# Patient Record
Sex: FEMALE | Race: WHITE | Employment: FULL TIME | URBAN - METROPOLITAN AREA
[De-identification: names, ages, dates, MRNs, and addresses within clinical notes are randomized per-mention and may not be internally consistent; named-entity substitution may affect disease eponyms.]

---

## 2018-03-04 LAB
LEFT VENTRICULAR EJECTION FRACTION HIGH VALUE: 45 %
LEFT VENTRICULAR EJECTION FRACTION MODE: NORMAL
LV EF: 40 %

## 2018-03-14 ENCOUNTER — OFFICE VISIT (OUTPATIENT)
Dept: CARDIOLOGY CLINIC | Age: 68
End: 2018-03-14
Payer: MEDICARE

## 2018-03-14 VITALS
RESPIRATION RATE: 20 BRPM | SYSTOLIC BLOOD PRESSURE: 104 MMHG | BODY MASS INDEX: 39.39 KG/M2 | HEART RATE: 78 BPM | HEIGHT: 59 IN | DIASTOLIC BLOOD PRESSURE: 60 MMHG | WEIGHT: 195.4 LBS

## 2018-03-14 DIAGNOSIS — I25.10 CORONARY ARTERY DISEASE INVOLVING NATIVE HEART WITHOUT ANGINA PECTORIS, UNSPECIFIED VESSEL OR LESION TYPE: ICD-10-CM

## 2018-03-14 DIAGNOSIS — I10 ESSENTIAL HYPERTENSION: Primary | ICD-10-CM

## 2018-03-14 PROCEDURE — 99214 OFFICE O/P EST MOD 30 MIN: CPT | Performed by: CLINICAL NURSE SPECIALIST

## 2018-03-14 PROCEDURE — 93000 ELECTROCARDIOGRAM COMPLETE: CPT | Performed by: CLINICAL NURSE SPECIALIST

## 2018-03-14 RX ORDER — NITROGLYCERIN 0.4 MG/1
0.4 TABLET SUBLINGUAL EVERY 5 MIN PRN
Qty: 25 TABLET | Refills: 3 | Status: SHIPPED | OUTPATIENT
Start: 2018-03-14 | End: 2019-11-11 | Stop reason: SDUPTHER

## 2018-03-14 RX ORDER — CARVEDILOL 3.12 MG/1
3.12 TABLET ORAL 2 TIMES DAILY WITH MEALS
COMMUNITY
End: 2018-03-14 | Stop reason: SDUPTHER

## 2018-03-14 RX ORDER — CARVEDILOL 3.12 MG/1
3.12 TABLET ORAL 2 TIMES DAILY WITH MEALS
Qty: 60 TABLET | Refills: 3 | Status: SHIPPED | OUTPATIENT
Start: 2018-03-14 | End: 2018-05-02 | Stop reason: SDUPTHER

## 2018-03-14 RX ORDER — ASPIRIN 81 MG/1
81 TABLET ORAL DAILY
Qty: 30 TABLET | Refills: 3 | Status: ON HOLD
Start: 2018-03-14 | End: 2021-04-09 | Stop reason: HOSPADM

## 2018-03-14 RX ORDER — LISINOPRIL 2.5 MG/1
2.5 TABLET ORAL DAILY
Qty: 30 TABLET | Refills: 3 | Status: SHIPPED | OUTPATIENT
Start: 2018-03-14 | End: 2018-05-02 | Stop reason: SDUPTHER

## 2018-03-14 NOTE — PROGRESS NOTES
[facility]  Dearl MD Shreyas Edmondson MD Dutch Quick, MD Elona Lyme. Sharmaine Barrera MD            Jose Alberto Gifford   1950  Chester Mann DO     Mrs. Jose Alberto Gifford is a 78year old female who is followed at our practice by Dr. Rachel Raymundo; she is being seen in post hospital follow up today after admission at [facility] for chest pain and subsequent cardiac catheterization, which showed nonobstructive coronary artery disease. She was discharged home on Aspirin and Coreg. She has had no further chest pain, but reports exertional dyspnea (e.g. when walking through parking lots). She attributes some of this to weight gain, but also notes loud snoring and daytime fatigue. She does note a worsening heartburn since starting Aspirin. She denies orthopnea, PND, or lower extremity edema. She has diffuse musculoskeletal pain, and is attempting to manage this without further use of Mobic or Soma. She has had a five pound weight gain since her last visit. Past medical history:  1. Obesity. BMI 39.  2. Allergy to Biaxin, codeine, neosporin and sulfa. 3. Hypertension. 4. Hyperlipidemia. 5. Hypothyroidism. 6. Depression. 7. Embolic CVA in 3085. 8. SARAH demonstrated a small PFO. Evaluated at [facility] and offered PFO closure as part of a CLOSURE I trial. Patient refused. Treated with aspirin. 9. No history of diabetes, CAD or PAD. 10. Echo 07/09/2007. EF 61%. Stage I diastolic dysfunction. No significant valvulopathy. 11. Lexiscan MPS 11/16/11. Normal perfusion. Breast attenuation artifact. EF 56%. 12. Lexiscan MPS 11/20/14. Normal perfusion. EF 51%. No change from previous study. 13. Admitted to [facility] 3/02/2018 to 3/06/2018 due to chest pain. Troponin peaked at 0.1  14. [place] nuclear stress test 3/03/2018: mild ischemia involving the anterior walls  15. Echocardiogram 3/04/2018: Doppler consistent with diastolic dysfunction. Left ventricle dilated. Normal left ventricular wall thickness. EF 40-45%. Moderate to severe anterior wall hypokinesis. Mild mitral valve regurgitation. Trace/mild aortic valve regurgitation. 16. Cardiac catheterization 3/05/2018: Left main short in length, moderate in caliber with luminal irregularities throughout its course. LAD long in length, large in caliber with luminal irregularities throughout its course. The diagonal system is moderate in caliber, and has luminal irregularities throughout its course. The LCx is long in length and large in caliber. There is very mild disease of about 20% in the proximal segment before its bifurcation. The AV groove branch is small in caliber. The obtuse marginal is large in caliber with a 20% lesion in its proximal segment. The RCA is long in length and large in caliber. There is another mild 20-30% lesion in the  Mid segment. The PDA and PLB are both of large caliber. They have luminal irregularities throughout their course. The LVEDP was 11 mmHg. Ejection fraction 50%. 17. Diverticulosis and colitis  18. Osteoarthritis  19. Remote tobacco abuse     Review of Systems:  HEENT: negative for acute visual and auditory problems  Constitutional: negative for fever and chills. She reports progressive weight gain since the death of her  a few years ago. Respiratory: negative for cough, orthopnea, or PND. Exertional dyspnea as described above. Cardiovascular: as per HPI. She denies palpitations or lower extremity edema. Gastrointestinal: negative for abdominal pain, diarrhea, nausea and vomiting. Heart burn as described in HPI. She also reports abdominal bloating. Genitourinary: negative for dysuria and hematuria  Derm: negative for rash and skin lesion(s)  Neurological: negative for seizures and tremors, dizziness or syncope. Endocrine: negative for diabetic symptoms including polydipsia and polyuria  Musculoskeletal: she has diffuse arthritic pains as well as myalgias. Psychiatric: negative for anxiety and major depression    On exam, she is a middle-aged, obese female in no particular distress. BP: 104/60. P: 78. R: 20. Wt. 195 lbs. BMI: 39.47. HEENT, conjunctiva pink. Sclera anicteric. Mucous membranes are moist. Neck, no JVD could be appreciated. Short and thick neck. No carotid bruits. Chest expands normally. No intercostal retractions. Cardiovascular exam, PMI could not be palpated. S1/S2 are normal.  Heart sounds are distant with no murmurs, rubs or gallops. Clear breath sounds throughout. No creps, rhonchi or wheezes. Abdomen is soft, nontender with intact bowel sounds. Extremities have no edema but her legs are tender to palpation. Distal pulses are normal bilaterally. No cyanosis. Skin has no rashes. Neurologically, oriented x 3. Psych, she is pleasant, but somewhat flat. Muscle tone is normal.     Diagnostic Testing:   EKG today showed sinus rhythm, 78 bpm, nonspecfic T wave abnormalities. No acute changes. CBC 3/06/2018: WBC 5.5, HGB 10.3, HCT 30.6, platelets 877  BMP 4/78/5389: sodium 139, potassium 4.5, chloride 101, CO2 28, BUN 19, Cr 0.75, GFR >60  Lipid panel 3/06/2018: cholesterol 169, triglycerides 174, LDL 94, HDL 40      Current Outpatient Prescriptions:     nitroGLYCERIN (NITROSTAT) 0.4 MG SL tablet, Place 1 tablet under the tongue every 5 minutes as needed for Chest pain, Disp: 25 tablet, Rfl: 3    lisinopril (PRINIVIL;ZESTRIL) 2.5 MG tablet, Take 1 tablet by mouth daily, Disp: 30 tablet, Rfl: 3    aspirin 81 MG EC tablet, Take 1 tablet by mouth daily, Disp: 30 tablet, Rfl: 3    carvedilol (COREG) 3.125 MG tablet, Take 1 tablet by mouth 2 times daily (with meals), Disp: 60 tablet, Rfl: 3    mesalamine (LIALDA) 1.2 G EC tablet, Take 1,200 mg by mouth daily (with breakfast), Disp: , Rfl:     levothyroxine (SYNTHROID) 100 MCG tablet, Take 100 mcg by mouth Daily. , Disp: , Rfl:     ascorbic acid (VITAMIN C) 500 MG tablet, Take 500 mg by mouth

## 2018-05-02 DIAGNOSIS — I25.10 CORONARY ARTERY DISEASE INVOLVING NATIVE HEART WITHOUT ANGINA PECTORIS, UNSPECIFIED VESSEL OR LESION TYPE: ICD-10-CM

## 2018-05-02 DIAGNOSIS — I10 ESSENTIAL HYPERTENSION: ICD-10-CM

## 2018-05-02 RX ORDER — LISINOPRIL 2.5 MG/1
2.5 TABLET ORAL DAILY
Qty: 90 TABLET | Refills: 3 | Status: SHIPPED | OUTPATIENT
Start: 2018-05-02 | End: 2019-03-22

## 2018-05-02 RX ORDER — SIMVASTATIN 20 MG
20 TABLET ORAL NIGHTLY
Qty: 90 TABLET | Refills: 3 | Status: SHIPPED | OUTPATIENT
Start: 2018-05-02 | End: 2019-01-28 | Stop reason: SDUPTHER

## 2018-05-02 RX ORDER — CARVEDILOL 3.12 MG/1
3.12 TABLET ORAL 2 TIMES DAILY WITH MEALS
Qty: 180 TABLET | Refills: 3 | Status: SHIPPED | OUTPATIENT
Start: 2018-05-02 | End: 2019-03-22 | Stop reason: SDUPTHER

## 2018-06-25 ENCOUNTER — HOSPITAL ENCOUNTER (OUTPATIENT)
Age: 68
Discharge: HOME | End: 2018-06-27
Payer: MEDICARE

## 2018-06-25 ENCOUNTER — HOSPITAL ENCOUNTER (OUTPATIENT)
Age: 68
Discharge: HOME | End: 2018-06-25
Payer: MEDICARE

## 2018-06-25 ENCOUNTER — OFFICE VISIT (OUTPATIENT)
Dept: CARDIOLOGY CLINIC | Age: 68
End: 2018-06-25
Payer: MEDICARE

## 2018-06-25 VITALS
DIASTOLIC BLOOD PRESSURE: 60 MMHG | HEIGHT: 59 IN | BODY MASS INDEX: 40 KG/M2 | RESPIRATION RATE: 18 BRPM | HEART RATE: 75 BPM | SYSTOLIC BLOOD PRESSURE: 114 MMHG | WEIGHT: 198.4 LBS

## 2018-06-25 DIAGNOSIS — Q21.12 PFO (PATENT FORAMEN OVALE): ICD-10-CM

## 2018-06-25 DIAGNOSIS — I63.9 CEREBROVASCULAR ACCIDENT (CVA), UNSPECIFIED MECHANISM (HCC): ICD-10-CM

## 2018-06-25 DIAGNOSIS — R07.9 CHEST PAIN, UNSPECIFIED TYPE: Primary | ICD-10-CM

## 2018-06-25 DIAGNOSIS — I42.8 NONISCHEMIC CARDIOMYOPATHY (HCC): ICD-10-CM

## 2018-06-25 LAB
ALBUMIN SERPL-MCNC: 4.1 G/DL (ref 3.5–5.2)
ALP BLD-CCNC: 86 U/L (ref 35–104)
ALT SERPL-CCNC: 14 U/L (ref 0–32)
ANION GAP SERPL CALCULATED.3IONS-SCNC: 12 MMOL/L (ref 7–16)
AST SERPL-CCNC: 14 U/L (ref 0–31)
BASOPHILS ABSOLUTE: 0.03 E9/L (ref 0–0.2)
BASOPHILS RELATIVE PERCENT: 0.4 % (ref 0–2)
BILIRUB SERPL-MCNC: 0.2 MG/DL (ref 0–1.2)
BUN BLDV-MCNC: 17 MG/DL (ref 8–23)
CALCIUM SERPL-MCNC: 9.5 MG/DL (ref 8.6–10.2)
CHLORIDE BLD-SCNC: 99 MMOL/L (ref 98–107)
CO2: 28 MMOL/L (ref 22–29)
CREAT SERPL-MCNC: 0.6 MG/DL (ref 0.5–1)
EOSINOPHILS ABSOLUTE: 0.01 E9/L (ref 0.05–0.5)
EOSINOPHILS RELATIVE PERCENT: 0.1 % (ref 0–6)
FOLATE: >20 NG/ML (ref 4.8–24.2)
GFR AFRICAN AMERICAN: >60
GFR NON-AFRICAN AMERICAN: >60 ML/MIN/1.73
GLUCOSE BLD-MCNC: 139 MG/DL (ref 74–109)
HCT VFR BLD CALC: 33.3 % (ref 34–48)
HEMOGLOBIN: 10.4 G/DL (ref 11.5–15.5)
IMMATURE GRANULOCYTES #: 0.07 E9/L
IMMATURE GRANULOCYTES %: 1 % (ref 0–5)
IMMATURE RETIC FRACT: 13.4 % (ref 3–15.9)
IRON SATURATION: 16 % (ref 15–50)
IRON: 44 MCG/DL (ref 37–145)
LYMPHOCYTES ABSOLUTE: 0.96 E9/L (ref 1.5–4)
LYMPHOCYTES RELATIVE PERCENT: 13.1 % (ref 20–42)
MCH RBC QN AUTO: 26.1 PG (ref 26–35)
MCHC RBC AUTO-ENTMCNC: 31.2 % (ref 32–34.5)
MCV RBC AUTO: 83.7 FL (ref 80–99.9)
MONOCYTES ABSOLUTE: 0.64 E9/L (ref 0.1–0.95)
MONOCYTES RELATIVE PERCENT: 8.7 % (ref 2–12)
NEUTROPHILS ABSOLUTE: 5.64 E9/L (ref 1.8–7.3)
NEUTROPHILS RELATIVE PERCENT: 76.7 % (ref 43–80)
PDW BLD-RTO: 14 FL (ref 11.5–15)
PLATELET # BLD: 217 E9/L (ref 130–450)
PMV BLD AUTO: 10.9 FL (ref 7–12)
POTASSIUM SERPL-SCNC: 4.7 MMOL/L (ref 3.5–5)
RBC # BLD: 3.98 E12/L (ref 3.5–5.5)
RETIC HGB EQUIVALENT: 30.9 PG (ref 28.2–36.6)
RETICULOCYTE ABSOLUTE COUNT: 0.08 E12/L
RETICULOCYTE COUNT PCT: 2 % (ref 0.4–1.9)
SEDIMENTATION RATE, ERYTHROCYTE: 46 MM/HR (ref 0–20)
SODIUM BLD-SCNC: 139 MMOL/L (ref 132–146)
TOTAL IRON BINDING CAPACITY: 283 MCG/DL (ref 250–450)
TOTAL PROTEIN: 7.6 G/DL (ref 6.4–8.3)
VITAMIN B-12: 460 PG/ML (ref 211–946)
WBC # BLD: 7.4 E9/L (ref 4.5–11.5)

## 2018-06-25 PROCEDURE — G8417 CALC BMI ABV UP PARAM F/U: HCPCS | Performed by: INTERNAL MEDICINE

## 2018-06-25 PROCEDURE — 83540 ASSAY OF IRON: CPT

## 2018-06-25 PROCEDURE — 85045 AUTOMATED RETICULOCYTE COUNT: CPT

## 2018-06-25 PROCEDURE — 85025 COMPLETE CBC W/AUTO DIFF WBC: CPT

## 2018-06-25 PROCEDURE — 83550 IRON BINDING TEST: CPT

## 2018-06-25 PROCEDURE — 93000 ELECTROCARDIOGRAM COMPLETE: CPT | Performed by: INTERNAL MEDICINE

## 2018-06-25 PROCEDURE — G8598 ASA/ANTIPLAT THER USED: HCPCS | Performed by: INTERNAL MEDICINE

## 2018-06-25 PROCEDURE — 1036F TOBACCO NON-USER: CPT | Performed by: INTERNAL MEDICINE

## 2018-06-25 PROCEDURE — 85651 RBC SED RATE NONAUTOMATED: CPT

## 2018-06-25 PROCEDURE — 1123F ACP DISCUSS/DSCN MKR DOCD: CPT | Performed by: INTERNAL MEDICINE

## 2018-06-25 PROCEDURE — 3017F COLORECTAL CA SCREEN DOC REV: CPT | Performed by: INTERNAL MEDICINE

## 2018-06-25 PROCEDURE — G8400 PT W/DXA NO RESULTS DOC: HCPCS | Performed by: INTERNAL MEDICINE

## 2018-06-25 PROCEDURE — 82746 ASSAY OF FOLIC ACID SERUM: CPT

## 2018-06-25 PROCEDURE — 80053 COMPREHEN METABOLIC PANEL: CPT

## 2018-06-25 PROCEDURE — 1090F PRES/ABSN URINE INCON ASSESS: CPT | Performed by: INTERNAL MEDICINE

## 2018-06-25 PROCEDURE — G8427 DOCREV CUR MEDS BY ELIG CLIN: HCPCS | Performed by: INTERNAL MEDICINE

## 2018-06-25 PROCEDURE — 4040F PNEUMOC VAC/ADMIN/RCVD: CPT | Performed by: INTERNAL MEDICINE

## 2018-06-25 PROCEDURE — 36415 COLL VENOUS BLD VENIPUNCTURE: CPT

## 2018-06-25 PROCEDURE — 82607 VITAMIN B-12: CPT

## 2018-06-25 PROCEDURE — 99214 OFFICE O/P EST MOD 30 MIN: CPT | Performed by: INTERNAL MEDICINE

## 2018-07-09 ENCOUNTER — HOSPITAL ENCOUNTER (OUTPATIENT)
Dept: CARDIAC CATH/INVASIVE PROCEDURES | Age: 68
Discharge: HOME | End: 2018-07-09
Payer: MEDICARE

## 2018-07-09 ENCOUNTER — ANESTHESIA EVENT (OUTPATIENT)
Dept: CARDIAC CATH/INVASIVE PROCEDURES | Age: 68
End: 2018-07-09

## 2018-07-09 ENCOUNTER — ANESTHESIA (OUTPATIENT)
Dept: CARDIAC CATH/INVASIVE PROCEDURES | Age: 68
End: 2018-07-09

## 2018-07-09 VITALS
BODY MASS INDEX: 39.31 KG/M2 | HEIGHT: 59 IN | WEIGHT: 195 LBS | TEMPERATURE: 97.6 F | DIASTOLIC BLOOD PRESSURE: 82 MMHG | HEART RATE: 79 BPM | SYSTOLIC BLOOD PRESSURE: 142 MMHG | OXYGEN SATURATION: 92 %

## 2018-07-09 VITALS
RESPIRATION RATE: 23 BRPM | OXYGEN SATURATION: 96 % | DIASTOLIC BLOOD PRESSURE: 80 MMHG | SYSTOLIC BLOOD PRESSURE: 167 MMHG

## 2018-07-09 DIAGNOSIS — I42.8 NONISCHEMIC CARDIOMYOPATHY (HCC): ICD-10-CM

## 2018-07-09 DIAGNOSIS — I63.9 CEREBROVASCULAR ACCIDENT (CVA), UNSPECIFIED MECHANISM (HCC): ICD-10-CM

## 2018-07-09 DIAGNOSIS — R07.9 CHEST PAIN, UNSPECIFIED TYPE: ICD-10-CM

## 2018-07-09 DIAGNOSIS — Q21.12 PFO (PATENT FORAMEN OVALE): ICD-10-CM

## 2018-07-09 PROCEDURE — 93312 ECHO TRANSESOPHAGEAL: CPT

## 2018-07-09 PROCEDURE — 93312 ECHO TRANSESOPHAGEAL: CPT | Performed by: INTERNAL MEDICINE

## 2018-07-09 PROCEDURE — 93325 DOPPLER ECHO COLOR FLOW MAPG: CPT | Performed by: INTERNAL MEDICINE

## 2018-07-09 PROCEDURE — 2709999900 HC NON-CHARGEABLE SUPPLY

## 2018-07-09 PROCEDURE — 2580000003 HC RX 258: Performed by: INTERNAL MEDICINE

## 2018-07-09 PROCEDURE — 3700000001 HC ADD 15 MINUTES (ANESTHESIA)

## 2018-07-09 PROCEDURE — 3700000000 HC ANESTHESIA ATTENDED CARE

## 2018-07-09 PROCEDURE — 6360000002 HC RX W HCPCS: Performed by: NURSE ANESTHETIST, CERTIFIED REGISTERED

## 2018-07-09 PROCEDURE — 93321 DOPPLER ECHO F-UP/LMTD STD: CPT

## 2018-07-09 PROCEDURE — 93320 DOPPLER ECHO COMPLETE: CPT | Performed by: INTERNAL MEDICINE

## 2018-07-09 PROCEDURE — 93325 DOPPLER ECHO COLOR FLOW MAPG: CPT

## 2018-07-09 RX ORDER — PROPOFOL 10 MG/ML
INJECTION, EMULSION INTRAVENOUS PRN
Status: DISCONTINUED | OUTPATIENT
Start: 2018-07-09 | End: 2018-07-09 | Stop reason: SDUPTHER

## 2018-07-09 RX ORDER — SODIUM CHLORIDE 9 MG/ML
INJECTION, SOLUTION INTRAVENOUS CONTINUOUS
Status: DISCONTINUED | OUTPATIENT
Start: 2018-07-09 | End: 2018-07-10 | Stop reason: HOSPADM

## 2018-07-09 RX ADMIN — SODIUM CHLORIDE: 9 INJECTION, SOLUTION INTRAVENOUS at 12:04

## 2018-07-09 RX ADMIN — PROPOFOL 300 MG: 10 INJECTION, EMULSION INTRAVENOUS at 14:05

## 2018-07-09 NOTE — ANESTHESIA POSTPROCEDURE EVALUATION
Department of Anesthesiology  Postprocedure Note    Patient: Beth Gu  MRN: [id number]  YOB: 1950  Date of evaluation: 7/9/2018  Time:  3:23 PM     Procedure Summary     Date:  07/09/18 Room / Location:  [facility] CATH LAB; [facility] ECHO    Anesthesia Start:  7781 Anesthesia Stop:  6043    Procedure:  ECHOCARDIOGRAM TRANSESOPHAGEAL Diagnosis:       Chest pain, unspecified type      PFO (patent foramen ovale)      Nonischemic cardiomyopathy ([address]      Cerebrovascular accident (CVA), unspecified mechanism ([address]      (h/o CVA with possible PFO.)    Scheduled Providers:   Responsible Provider:  Markus Rasmussen DO    Anesthesia Type:  MAC ASA Status:  3          Anesthesia Type: MAC    Alireza Phase I:      Alireza Phase II:      Last vitals: Reviewed and per EMR flowsheets.        Anesthesia Post Evaluation    Patient location during evaluation: bedside  Patient participation: complete - patient cannot participate  Level of consciousness: awake and alert  Airway patency: patent  Nausea & Vomiting: no nausea and no vomiting  Complications: no  Cardiovascular status: blood pressure returned to baseline  Respiratory status: acceptable  Hydration status: euvolemic

## 2018-07-09 NOTE — ANESTHESIA PRE PROCEDURE
Sulfasalazine Nausea And Vomiting     severe headaches. Problem List:    Patient Active Problem List   Diagnosis Code    PFO (patent foramen ovale) Q21.1    CVA (cerebral vascular accident) ([facility] I63.9    Hypertension I10    History of thoracic outlet syndrome Z86.69    Thyroid disease E07.9    Hiatal hernia K44.9    Nephrolithiasis N20.0    Hypothyroidism E03.9    Nonischemic cardiomyopathy (HCC) I42.8       Past Medical History:        Diagnosis Date    Arthritis     Cerebrovascular disease     Chronic back pain     Congenital heart disease     CVA (cerebral vascular accident) ([facility] [address] [facility] 1/0966    Embolic. ? tia?  Depression     Fracture of right shoulder 1/2011    Frequent UTI     GERD (gastroesophageal reflux disease)     H/O ligation of vein 1974 Bilaterally.  Hiatal hernia 2000    History of thoracic outlet syndrome     Hypercholesterolemia     Hypertension     Hypothyroidism     Kidney stones     Nephrolithiasis     Bilaterally.  Nonischemic cardiomyopathy ([facility] 6/25/2018    ROSALVA (obstructive sleep apnea) 04/16/2018    mild    Osteoarthritis     PFO (patent foramen ovale)     Pneumonia 1989    Thyroid disease     Hypothyroidism. Past Surgical History:        Procedure Laterality Date    CARDIAC CATHETERIZATION  03/05/2018    No significant CAD  (Dr Mikel Trevino)    COLONOSCOPY  11/04    Negative.  COLONOSCOPY  09/17/2013    COLONOSCOPY  12/30/2016    Sigmoid Colitis, Diverticulitis    DIAGNOSTIC CARDIAC CATH LAB PROCEDURE  5/17/1988    [facility], patent coronaries.  DIAGNOSTIC CARDIAC CATH LAB PROCEDURE  6/10/1992    [facility], patent coronaries.  DIAGNOSTIC CARDIAC CATH LAB PROCEDURE  10/26/2001    [facility], Normal cath, right femoral angiography and Perclose.  DIAGNOSTIC CARDIAC CATH LAB PROCEDURE  12/7/2005    [facility].    HYSTERECTOMY  1983    LITHOTRIPSY      MIDDLE EAR SURGERY  1987    Eardrum replaced in right ear.     TONSILLECTOMY      URETHRA SURGERY results found for: [facility]    Anesthesia Evaluation  Patient summary reviewed no history of anesthetic complications:   Airway: Mallampati: II  TM distance: >3 FB   Neck ROM: full  Mouth opening: > = 3 FB Dental:    (+) upper dentures and lower dentures      Pulmonary:   (+) sleep apnea:  decreased breath sounds,                             Cardiovascular:    (+) hypertension:,         Rhythm: regular  Rate: normal                 ROS comment: + PFO    Mild LV dysfunction     Neuro/Psych:   (+) CVA:, neuromuscular disease:,              ROS comment: Stroke secondary to PFO GI/Hepatic/Renal:   (+) GERD:,           Endo/Other:    (+) hypothyroidism::., .                 Abdominal:           Vascular:                                        Anesthesia Plan      MAC     ASA 3       Induction: intravenous. Anesthetic plan and risks discussed with patient. Plan discussed with CRNA.                   Octavia Nelson DO   7/9/2018

## 2018-07-17 ENCOUNTER — HOSPITAL ENCOUNTER (OUTPATIENT)
Age: 68
Discharge: HOME | End: 2018-07-19
Payer: MEDICARE

## 2018-07-17 LAB
ALBUMIN SERPL-MCNC: 4.6 G/DL (ref 3.5–5.2)
ALP BLD-CCNC: 87 U/L (ref 35–104)
ALT SERPL-CCNC: 17 U/L (ref 0–32)
ANION GAP SERPL CALCULATED.3IONS-SCNC: 16 MMOL/L (ref 7–16)
AST SERPL-CCNC: 19 U/L (ref 0–31)
BASOPHILS ABSOLUTE: 0.02 E9/L (ref 0–0.2)
BASOPHILS RELATIVE PERCENT: 0.3 % (ref 0–2)
BILIRUB SERPL-MCNC: 0.4 MG/DL (ref 0–1.2)
BUN BLDV-MCNC: 21 MG/DL (ref 8–23)
CALCIUM SERPL-MCNC: 9.9 MG/DL (ref 8.6–10.2)
CHLORIDE BLD-SCNC: 98 MMOL/L (ref 98–107)
CHOLESTEROL, TOTAL: 186 MG/DL (ref 0–199)
CO2: 26 MMOL/L (ref 22–29)
CREAT SERPL-MCNC: 0.7 MG/DL (ref 0.5–1)
EOSINOPHILS ABSOLUTE: 0.01 E9/L (ref 0.05–0.5)
EOSINOPHILS RELATIVE PERCENT: 0.2 % (ref 0–6)
GFR AFRICAN AMERICAN: >60
GFR NON-AFRICAN AMERICAN: >60 ML/MIN/1.73
GLUCOSE BLD-MCNC: 90 MG/DL (ref 74–109)
HCT VFR BLD CALC: 35.5 % (ref 34–48)
HDLC SERPL-MCNC: 44 MG/DL
HEMOGLOBIN: 10.9 G/DL (ref 11.5–15.5)
IMMATURE GRANULOCYTES #: 0.04 E9/L
IMMATURE GRANULOCYTES %: 0.6 % (ref 0–5)
LDL CHOLESTEROL CALCULATED: 106 MG/DL (ref 0–99)
LYMPHOCYTES ABSOLUTE: 1.06 E9/L (ref 1.5–4)
LYMPHOCYTES RELATIVE PERCENT: 16.3 % (ref 20–42)
MCH RBC QN AUTO: 26.6 PG (ref 26–35)
MCHC RBC AUTO-ENTMCNC: 30.7 % (ref 32–34.5)
MCV RBC AUTO: 86.6 FL (ref 80–99.9)
MONOCYTES ABSOLUTE: 0.56 E9/L (ref 0.1–0.95)
MONOCYTES RELATIVE PERCENT: 8.6 % (ref 2–12)
NEUTROPHILS ABSOLUTE: 4.83 E9/L (ref 1.8–7.3)
NEUTROPHILS RELATIVE PERCENT: 74 % (ref 43–80)
PDW BLD-RTO: 14.9 FL (ref 11.5–15)
PLATELET # BLD: 228 E9/L (ref 130–450)
PMV BLD AUTO: 11.7 FL (ref 7–12)
POTASSIUM SERPL-SCNC: 4.9 MMOL/L (ref 3.5–5)
RBC # BLD: 4.1 E12/L (ref 3.5–5.5)
SODIUM BLD-SCNC: 140 MMOL/L (ref 132–146)
T3 TOTAL: 105.3 NG/DL (ref 80–200)
T3 UPTAKE PERCENT: 31.4 % (ref 22.5–37)
T4 TOTAL: 7.9 MCG/DL (ref 4.5–11.7)
TOTAL PROTEIN: 8.3 G/DL (ref 6.4–8.3)
TRIGL SERPL-MCNC: 179 MG/DL (ref 0–149)
TSH SERPL DL<=0.05 MIU/L-ACNC: 0.82 UIU/ML (ref 0.27–4.2)
VLDLC SERPL CALC-MCNC: 36 MG/DL
WBC # BLD: 6.5 E9/L (ref 4.5–11.5)

## 2018-07-17 PROCEDURE — 84480 ASSAY TRIIODOTHYRONINE (T3): CPT

## 2018-07-17 PROCEDURE — 80061 LIPID PANEL: CPT

## 2018-07-17 PROCEDURE — 80053 COMPREHEN METABOLIC PANEL: CPT

## 2018-07-17 PROCEDURE — 84479 ASSAY OF THYROID (T3 OR T4): CPT

## 2018-07-17 PROCEDURE — 85025 COMPLETE CBC W/AUTO DIFF WBC: CPT

## 2018-07-17 PROCEDURE — 84443 ASSAY THYROID STIM HORMONE: CPT

## 2018-07-17 PROCEDURE — 84436 ASSAY OF TOTAL THYROXINE: CPT

## 2018-07-26 ENCOUNTER — OFFICE VISIT (OUTPATIENT)
Dept: CARDIOLOGY CLINIC | Age: 68
End: 2018-07-26
Payer: MEDICARE

## 2018-07-26 VITALS
SYSTOLIC BLOOD PRESSURE: 100 MMHG | DIASTOLIC BLOOD PRESSURE: 60 MMHG | HEIGHT: 59 IN | WEIGHT: 196.8 LBS | HEART RATE: 82 BPM | RESPIRATION RATE: 16 BRPM | BODY MASS INDEX: 39.68 KG/M2

## 2018-07-26 DIAGNOSIS — I42.8 NONISCHEMIC CARDIOMYOPATHY (HCC): ICD-10-CM

## 2018-07-26 DIAGNOSIS — I63.9 CEREBROVASCULAR ACCIDENT (CVA), UNSPECIFIED MECHANISM (HCC): ICD-10-CM

## 2018-07-26 DIAGNOSIS — Q21.12 PFO (PATENT FORAMEN OVALE): Primary | ICD-10-CM

## 2018-07-26 PROCEDURE — G8400 PT W/DXA NO RESULTS DOC: HCPCS | Performed by: INTERNAL MEDICINE

## 2018-07-26 PROCEDURE — 3017F COLORECTAL CA SCREEN DOC REV: CPT | Performed by: INTERNAL MEDICINE

## 2018-07-26 PROCEDURE — 1090F PRES/ABSN URINE INCON ASSESS: CPT | Performed by: INTERNAL MEDICINE

## 2018-07-26 PROCEDURE — 1123F ACP DISCUSS/DSCN MKR DOCD: CPT | Performed by: INTERNAL MEDICINE

## 2018-07-26 PROCEDURE — G8417 CALC BMI ABV UP PARAM F/U: HCPCS | Performed by: INTERNAL MEDICINE

## 2018-07-26 PROCEDURE — 4040F PNEUMOC VAC/ADMIN/RCVD: CPT | Performed by: INTERNAL MEDICINE

## 2018-07-26 PROCEDURE — 99215 OFFICE O/P EST HI 40 MIN: CPT | Performed by: INTERNAL MEDICINE

## 2018-07-26 PROCEDURE — G8427 DOCREV CUR MEDS BY ELIG CLIN: HCPCS | Performed by: INTERNAL MEDICINE

## 2018-07-26 PROCEDURE — 1101F PT FALLS ASSESS-DOCD LE1/YR: CPT | Performed by: INTERNAL MEDICINE

## 2018-07-26 PROCEDURE — 1036F TOBACCO NON-USER: CPT | Performed by: INTERNAL MEDICINE

## 2018-07-26 PROCEDURE — G8598 ASA/ANTIPLAT THER USED: HCPCS | Performed by: INTERNAL MEDICINE

## 2018-07-26 RX ORDER — CLOPIDOGREL 300 MG/1
300 TABLET, FILM COATED ORAL ONCE
Qty: 1 TABLET | Refills: 0 | Status: SHIPPED | OUTPATIENT
Start: 2018-07-26 | End: 2019-03-22

## 2018-07-26 RX ORDER — CLOPIDOGREL BISULFATE 75 MG/1
75 TABLET ORAL DAILY
Qty: 30 TABLET | Refills: 3 | Status: SHIPPED | OUTPATIENT
Start: 2018-07-26 | End: 2019-03-22

## 2018-07-26 NOTE — PATIENT INSTRUCTIONS
Patient Education        Heart-Healthy Diet: Care Instructions  Your Care Instructions    A heart-healthy diet has lots of vegetables, fruits, nuts, beans, and whole grains, and is low in salt. It limits foods that are high in saturated fat, such as meats, cheeses, and fried foods. It may be hard to change your diet, but even small changes can lower your risk of heart attack and heart disease. Follow-up care is a key part of your treatment and safety. Be sure to make and go to all appointments, and call your doctor if you are having problems. It's also a good idea to know your test results and keep a list of the medicines you take. How can you care for yourself at home? Watch your portions  · Learn what a serving is. A \"serving\" and a \"portion\" are not always the same thing. Make sure that you are not eating larger portions than are recommended. For example, a serving of pasta is ½ cup. A serving size of meat is 2 to 3 ounces. A 3-ounce serving is about the size of a deck of cards. Measure serving sizes until you are good at [place]" them. Keep in mind that restaurants often serve portions that are 2 or 3 times the size of one serving. · To keep your energy level up and keep you from feeling hungry, eat often but in smaller portions. · Eat only the number of calories you need to stay at a healthy weight. If you need to lose weight, eat fewer calories than your body burns (through exercise and other physical activity). Eat more fruits and vegetables  · Eat a variety of fruit and vegetables every day. Dark green, deep orange, red, or yellow fruits and vegetables are especially good for you. Examples include spinach, carrots, peaches, and berries. · Keep carrots, celery, and other veggies handy for snacks. Buy fruit that is in season and store it where you can see it so that you will be tempted to eat it. · Cook dishes that have a lot of veggies in them, such as stir-fries and soups.   Limit saturated and [employer], and be sure to contact your doctor if:    · You would like help planning heart-healthy meals. Where can you learn more? Go to https://chpepiceweb.[employer]. org and sign in to your [employer] account. Enter V137 in the [employer] box to learn more about \"Heart-Healthy Diet: Care Instructions. \"     If you do not have an account, please click on the \"Sign Up Now\" link. Current as of: December 6, 2017  Content Version: 11.6  © 9476-8418 [employer]. Care instructions adapted under license by [facility] ([facility]). If you have questions about a medical condition or this instruction, always ask your healthcare professional. [address] any warranty or liability for your use of this information.

## 2018-07-26 NOTE — PROGRESS NOTES
anicteric. Mucous membranes are moist.  Neck is short and thick. No JVD could be appreciated. Carotid upstrokes are normal, no bruits. Chest expands normally. No intercostal retractions. Cardiovascular exam, normal S1/S2, heart sounds are distant. No murmurs, rubs or gallops. Lungs have diminished air entry all over. No creps, rhonchi or wheezes. Abdomen is morbidly obese, soft and nontender. Extremities are without edema. Distal pulses are normal bilaterally. Skin has no rashes. Neurologically, oriented x 3. Psych, she is pleasant. Back, no tenderness. Muscle tone is normal.      Mrs. Jean Felix was in the outpatient office to discuss the candidacy for PFO closure. She had a remote CVA. SARAH does reveal the presence of a PFO. Rationale for PFO closure and the recent data suggesting prevention of further embolic events were discussed with her in detail. The procedure itself was described. The risks and benefits were reviewed. She is willing to proceed. The procedure will be scheduled in a couple weeks. She will be loaded with Plavix prior to the procedure. She will require dual antiplatelet therapy for 6 months post procedure. Her medical therapy is as follows:   clopidogrel (PLAVIX) 75 MG tablet Take 1 tablet by mouth daily   clopidogrel (PLAVIX) 300 MG TABS Take 1 tablet by mouth once for 1 dose   carvedilol (COREG) 3.125 MG tablet Take 1 tablet by mouth 2 times daily (with meals)   lisinopril (PRINIVIL;ZESTRIL) 2.5 MG tablet Take 1 tablet by mouth daily   simvastatin (ZOCOR) 20 MG tablet Take 1 tablet by mouth nightly   nitroGLYCERIN (NITROSTAT) 0.4 MG SL tablet Place 1 tablet under the tongue every 5 minutes as needed for Chest pain   aspirin 81 MG EC tablet Take 1 tablet by mouth daily   mesalamine (LIALDA) 1.2 G EC tablet Take 1,200 mg by mouth daily (with breakfast)   levothyroxine (SYNTHROID) 100 MCG tablet Take 100 mcg by mouth Daily.    ascorbic acid (VITAMIN C) 500 MG tablet Take 500 mg by mouth daily.    sertraline (ZOLOFT) 25 MG tablet Take 50 mg by mouth every evening    multivitamin-iron-minerals-folic acid (CENTRUM) chewable tablet Take 1 tablet by mouth daily.     fluticasone (FLONASE) 50 MCG/ACT nasal spray 2 sprays by Nasal route every morning.     omeprazole (PRILOSEC) 20 MG capsule Take 20 mg by mouth 2 times daily.     sucralfate (CARAFATE) 1 GM tablet Take 1 g by mouth 2 times daily.       RPV/tlr

## 2018-08-07 ENCOUNTER — TELEPHONE (OUTPATIENT)
Dept: CARDIOLOGY CLINIC | Age: 68
End: 2018-08-07

## 2018-08-07 NOTE — TELEPHONE ENCOUNTER
8/7/18    Spoke with patient today to get labs drawn. Patient left message with Sandra Irwin to cancel her PFO closure 7/31/2018. Patient has rectal bleeding and will be seeing Dr. Carolin Castillo to control. She cancelled her PFO closure at this time and will continue after rectal bleeding is under controlled. Patient will call to reschedule at a later date.     SIMONA

## 2018-11-06 ENCOUNTER — HOSPITAL ENCOUNTER (OUTPATIENT)
Age: 68
Discharge: HOME | End: 2018-11-06
Payer: MEDICARE

## 2018-11-06 LAB
ALBUMIN SERPL-MCNC: 4.2 G/DL (ref 3.5–5.2)
ANION GAP SERPL CALCULATED.3IONS-SCNC: 12 MMOL/L (ref 7–16)
BASOPHILS ABSOLUTE: 0.03 E9/L (ref 0–0.2)
BASOPHILS RELATIVE PERCENT: 0.4 % (ref 0–2)
BUN BLDV-MCNC: 15 MG/DL (ref 8–23)
CALCIUM SERPL-MCNC: 9.4 MG/DL (ref 8.6–10.2)
CHLORIDE BLD-SCNC: 100 MMOL/L (ref 98–107)
CO2: 29 MMOL/L (ref 22–29)
CREAT SERPL-MCNC: 0.7 MG/DL (ref 0.5–1)
EOSINOPHILS ABSOLUTE: 0.05 E9/L (ref 0.05–0.5)
EOSINOPHILS RELATIVE PERCENT: 0.7 % (ref 0–6)
FOLATE: >20 NG/ML (ref 4.8–24.2)
GFR AFRICAN AMERICAN: >60
GFR NON-AFRICAN AMERICAN: >60 ML/MIN/1.73
GLUCOSE BLD-MCNC: 99 MG/DL (ref 74–99)
HCT VFR BLD CALC: 32.7 % (ref 34–48)
HEMOGLOBIN: 10.2 G/DL (ref 11.5–15.5)
IMMATURE GRANULOCYTES #: 0.06 E9/L
IMMATURE GRANULOCYTES %: 0.8 % (ref 0–5)
IMMATURE RETIC FRACT: 11.5 % (ref 3–15.9)
IRON SATURATION: 16 % (ref 15–50)
IRON: 42 MCG/DL (ref 37–145)
LYMPHOCYTES ABSOLUTE: 1.26 E9/L (ref 1.5–4)
LYMPHOCYTES RELATIVE PERCENT: 17.6 % (ref 20–42)
MCH RBC QN AUTO: 26.3 PG (ref 26–35)
MCHC RBC AUTO-ENTMCNC: 31.2 % (ref 32–34.5)
MCV RBC AUTO: 84.3 FL (ref 80–99.9)
MONOCYTES ABSOLUTE: 0.59 E9/L (ref 0.1–0.95)
MONOCYTES RELATIVE PERCENT: 8.2 % (ref 2–12)
NEUTROPHILS ABSOLUTE: 5.18 E9/L (ref 1.8–7.3)
NEUTROPHILS RELATIVE PERCENT: 72.3 % (ref 43–80)
PDW BLD-RTO: 14.2 FL (ref 11.5–15)
PHOSPHORUS: 3.4 MG/DL (ref 2.5–4.5)
PLATELET # BLD: 228 E9/L (ref 130–450)
PMV BLD AUTO: 11.8 FL (ref 7–12)
POTASSIUM SERPL-SCNC: 4.3 MMOL/L (ref 3.5–5)
RBC # BLD: 3.88 E12/L (ref 3.5–5.5)
RETIC HGB EQUIVALENT: 30.8 PG (ref 28.2–36.6)
RETICULOCYTE ABSOLUTE COUNT: 0.08 E12/L
RETICULOCYTE COUNT PCT: 2.1 % (ref 0.4–1.9)
SODIUM BLD-SCNC: 141 MMOL/L (ref 132–146)
TOTAL IRON BINDING CAPACITY: 268 MCG/DL (ref 250–450)
VITAMIN B-12: 584 PG/ML (ref 211–946)
WBC # BLD: 7.2 E9/L (ref 4.5–11.5)

## 2018-11-06 PROCEDURE — 82746 ASSAY OF FOLIC ACID SERUM: CPT

## 2018-11-06 PROCEDURE — 82607 VITAMIN B-12: CPT

## 2018-11-06 PROCEDURE — 83550 IRON BINDING TEST: CPT

## 2018-11-06 PROCEDURE — 85045 AUTOMATED RETICULOCYTE COUNT: CPT

## 2018-11-06 PROCEDURE — 80069 RENAL FUNCTION PANEL: CPT

## 2018-11-06 PROCEDURE — 36415 COLL VENOUS BLD VENIPUNCTURE: CPT

## 2018-11-06 PROCEDURE — 83540 ASSAY OF IRON: CPT

## 2018-11-06 PROCEDURE — 85025 COMPLETE CBC W/AUTO DIFF WBC: CPT

## 2018-11-12 ENCOUNTER — HOSPITAL ENCOUNTER (OUTPATIENT)
Age: 68
Discharge: HOME | End: 2018-11-12
Payer: MEDICARE

## 2018-11-12 PROCEDURE — 82747 ASSAY OF FOLIC ACID RBC: CPT

## 2018-11-15 LAB
HCT VFR BLD CALC: 33.2 %
RBC FOLATE: NORMAL NG/ML

## 2019-03-22 ENCOUNTER — HOSPITAL ENCOUNTER (OUTPATIENT)
Age: 69
Discharge: HOME | End: 2019-03-24
Payer: MEDICARE

## 2019-03-22 DIAGNOSIS — E78.2 MIXED HYPERLIPIDEMIA: ICD-10-CM

## 2019-03-22 DIAGNOSIS — I42.8 NONISCHEMIC CARDIOMYOPATHY (HCC): ICD-10-CM

## 2019-03-22 DIAGNOSIS — E03.9 ACQUIRED HYPOTHYROIDISM: ICD-10-CM

## 2019-03-22 PROBLEM — G47.33 OSA (OBSTRUCTIVE SLEEP APNEA): Status: ACTIVE | Noted: 2019-03-22

## 2019-03-22 PROBLEM — I25.10 CORONARY ARTERY DISEASE INVOLVING NATIVE CORONARY ARTERY OF NATIVE HEART WITHOUT ANGINA PECTORIS: Status: ACTIVE | Noted: 2019-03-22

## 2019-03-22 PROBLEM — E66.812 CLASS 2 SEVERE OBESITY DUE TO EXCESS CALORIES WITH SERIOUS COMORBIDITY AND BODY MASS INDEX (BMI) OF 39.0 TO 39.9 IN ADULT: Status: ACTIVE | Noted: 2019-03-22

## 2019-03-22 PROBLEM — E66.01 CLASS 2 SEVERE OBESITY DUE TO EXCESS CALORIES WITH SERIOUS COMORBIDITY AND BODY MASS INDEX (BMI) OF 39.0 TO 39.9 IN ADULT (HCC): Status: ACTIVE | Noted: 2019-03-22

## 2019-03-22 LAB
ALBUMIN SERPL-MCNC: 4.7 G/DL (ref 3.5–5.2)
ALP BLD-CCNC: 78 U/L (ref 35–104)
ALT SERPL-CCNC: 16 U/L (ref 0–32)
ANION GAP SERPL CALCULATED.3IONS-SCNC: 13 MMOL/L (ref 7–16)
AST SERPL-CCNC: 17 U/L (ref 0–31)
BASOPHILS ABSOLUTE: 0.03 E9/L (ref 0–0.2)
BASOPHILS RELATIVE PERCENT: 0.5 % (ref 0–2)
BILIRUB SERPL-MCNC: 0.4 MG/DL (ref 0–1.2)
BUN BLDV-MCNC: 17 MG/DL (ref 8–23)
CALCIUM SERPL-MCNC: 9.7 MG/DL (ref 8.6–10.2)
CHLORIDE BLD-SCNC: 99 MMOL/L (ref 98–107)
CHOLESTEROL, TOTAL: 201 MG/DL (ref 0–199)
CO2: 29 MMOL/L (ref 22–29)
CREAT SERPL-MCNC: 0.6 MG/DL (ref 0.5–1)
EOSINOPHILS ABSOLUTE: 0.11 E9/L (ref 0.05–0.5)
EOSINOPHILS RELATIVE PERCENT: 1.8 % (ref 0–6)
GFR AFRICAN AMERICAN: >60
GFR NON-AFRICAN AMERICAN: >60 ML/MIN/1.73
GLUCOSE BLD-MCNC: 87 MG/DL (ref 74–99)
HCT VFR BLD CALC: 34.9 % (ref 34–48)
HDLC SERPL-MCNC: 50 MG/DL
HEMOGLOBIN: 10.7 G/DL (ref 11.5–15.5)
IMMATURE GRANULOCYTES #: 0.06 E9/L
IMMATURE GRANULOCYTES %: 1 % (ref 0–5)
LDL CHOLESTEROL CALCULATED: 117 MG/DL (ref 0–99)
LYMPHOCYTES ABSOLUTE: 1.08 E9/L (ref 1.5–4)
LYMPHOCYTES RELATIVE PERCENT: 17.5 % (ref 20–42)
MCH RBC QN AUTO: 26.7 PG (ref 26–35)
MCHC RBC AUTO-ENTMCNC: 30.7 % (ref 32–34.5)
MCV RBC AUTO: 87 FL (ref 80–99.9)
MONOCYTES ABSOLUTE: 0.51 E9/L (ref 0.1–0.95)
MONOCYTES RELATIVE PERCENT: 8.3 % (ref 2–12)
NEUTROPHILS ABSOLUTE: 4.38 E9/L (ref 1.8–7.3)
NEUTROPHILS RELATIVE PERCENT: 70.9 % (ref 43–80)
PDW BLD-RTO: 14.5 FL (ref 11.5–15)
PLATELET # BLD: 227 E9/L (ref 130–450)
PMV BLD AUTO: 11.6 FL (ref 7–12)
POTASSIUM SERPL-SCNC: 4.6 MMOL/L (ref 3.5–5)
RBC # BLD: 4.01 E12/L (ref 3.5–5.5)
SODIUM BLD-SCNC: 141 MMOL/L (ref 132–146)
TOTAL PROTEIN: 8.5 G/DL (ref 6.4–8.3)
TRIGL SERPL-MCNC: 171 MG/DL (ref 0–149)
TSH SERPL DL<=0.05 MIU/L-ACNC: 1.44 UIU/ML (ref 0.27–4.2)
VLDLC SERPL CALC-MCNC: 34 MG/DL
WBC # BLD: 6.2 E9/L (ref 4.5–11.5)

## 2019-03-22 PROCEDURE — 84443 ASSAY THYROID STIM HORMONE: CPT

## 2019-03-22 PROCEDURE — 85025 COMPLETE CBC W/AUTO DIFF WBC: CPT

## 2019-03-22 PROCEDURE — 80061 LIPID PANEL: CPT

## 2019-03-22 PROCEDURE — 80053 COMPREHEN METABOLIC PANEL: CPT

## 2019-04-29 ENCOUNTER — HOSPITAL ENCOUNTER (OUTPATIENT)
Age: 69
Discharge: HOME | End: 2019-04-29
Payer: MEDICARE

## 2019-04-29 LAB
ALBUMIN SERPL-MCNC: 4.4 G/DL (ref 3.5–5.2)
ANION GAP SERPL CALCULATED.3IONS-SCNC: 14 MMOL/L (ref 7–16)
BASOPHILS ABSOLUTE: 0.03 E9/L (ref 0–0.2)
BASOPHILS RELATIVE PERCENT: 0.4 % (ref 0–2)
BUN BLDV-MCNC: 15 MG/DL (ref 8–23)
CALCIUM SERPL-MCNC: 9.3 MG/DL (ref 8.6–10.2)
CHLORIDE BLD-SCNC: 103 MMOL/L (ref 98–107)
CO2: 26 MMOL/L (ref 22–29)
CREAT SERPL-MCNC: 0.6 MG/DL (ref 0.5–1)
EOSINOPHILS ABSOLUTE: 0.13 E9/L (ref 0.05–0.5)
EOSINOPHILS RELATIVE PERCENT: 1.8 % (ref 0–6)
GFR AFRICAN AMERICAN: >60
GFR NON-AFRICAN AMERICAN: >60 ML/MIN/1.73
GLUCOSE BLD-MCNC: 109 MG/DL (ref 74–99)
HCT VFR BLD CALC: 35.4 % (ref 34–48)
HEMOGLOBIN: 11 G/DL (ref 11.5–15.5)
IMMATURE GRANULOCYTES #: 0.04 E9/L
IMMATURE GRANULOCYTES %: 0.5 % (ref 0–5)
IMMATURE RETIC FRACT: 11.1 % (ref 3–15.9)
IRON SATURATION: 16 % (ref 15–50)
IRON: 46 MCG/DL (ref 37–145)
LYMPHOCYTES ABSOLUTE: 1.2 E9/L (ref 1.5–4)
LYMPHOCYTES RELATIVE PERCENT: 16.3 % (ref 20–42)
MCH RBC QN AUTO: 27.1 PG (ref 26–35)
MCHC RBC AUTO-ENTMCNC: 31.1 % (ref 32–34.5)
MCV RBC AUTO: 87.2 FL (ref 80–99.9)
MONOCYTES ABSOLUTE: 0.69 E9/L (ref 0.1–0.95)
MONOCYTES RELATIVE PERCENT: 9.4 % (ref 2–12)
NEUTROPHILS ABSOLUTE: 5.25 E9/L (ref 1.8–7.3)
NEUTROPHILS RELATIVE PERCENT: 71.6 % (ref 43–80)
PDW BLD-RTO: 13.9 FL (ref 11.5–15)
PHOSPHORUS: 3.1 MG/DL (ref 2.5–4.5)
PLATELET # BLD: 227 E9/L (ref 130–450)
PMV BLD AUTO: 11 FL (ref 7–12)
POTASSIUM SERPL-SCNC: 4.6 MMOL/L (ref 3.5–5)
RBC # BLD: 4.06 E12/L (ref 3.5–5.5)
RETIC HGB EQUIVALENT: 30 PG (ref 28.2–36.6)
RETICULOCYTE ABSOLUTE COUNT: 0.09 E12/L
RETICULOCYTE COUNT PCT: 2.1 % (ref 0.4–1.9)
SEDIMENTATION RATE, ERYTHROCYTE: 48 MM/HR (ref 0–20)
SODIUM BLD-SCNC: 143 MMOL/L (ref 132–146)
TOTAL IRON BINDING CAPACITY: 280 MCG/DL (ref 250–450)
WBC # BLD: 7.3 E9/L (ref 4.5–11.5)

## 2019-04-29 PROCEDURE — 80069 RENAL FUNCTION PANEL: CPT

## 2019-04-29 PROCEDURE — 83540 ASSAY OF IRON: CPT

## 2019-04-29 PROCEDURE — 36415 COLL VENOUS BLD VENIPUNCTURE: CPT

## 2019-04-29 PROCEDURE — 83550 IRON BINDING TEST: CPT

## 2019-04-29 PROCEDURE — 85651 RBC SED RATE NONAUTOMATED: CPT

## 2019-04-29 PROCEDURE — 85045 AUTOMATED RETICULOCYTE COUNT: CPT

## 2019-04-29 PROCEDURE — 85025 COMPLETE CBC W/AUTO DIFF WBC: CPT

## 2019-05-02 ENCOUNTER — APPOINTMENT (OUTPATIENT)
Dept: GENERAL RADIOLOGY | Age: 69
End: 2019-05-02
Payer: COMMERCIAL

## 2019-05-02 ENCOUNTER — HOSPITAL ENCOUNTER (EMERGENCY)
Age: 69
Discharge: HOME | End: 2019-05-02
Payer: COMMERCIAL

## 2019-05-02 VITALS
TEMPERATURE: 98.2 F | OXYGEN SATURATION: 93 % | HEART RATE: 82 BPM | SYSTOLIC BLOOD PRESSURE: 132 MMHG | DIASTOLIC BLOOD PRESSURE: 83 MMHG | WEIGHT: 198 LBS | RESPIRATION RATE: 20 BRPM | BODY MASS INDEX: 39.19 KG/M2

## 2019-05-02 DIAGNOSIS — S00.531A CONTUSION OF LIP, INITIAL ENCOUNTER: ICD-10-CM

## 2019-05-02 DIAGNOSIS — S46.912A STRAIN OF LEFT SHOULDER, INITIAL ENCOUNTER: Primary | ICD-10-CM

## 2019-05-02 PROCEDURE — 99212 OFFICE O/P EST SF 10 MIN: CPT

## 2019-05-02 PROCEDURE — 73030 X-RAY EXAM OF SHOULDER: CPT

## 2019-05-02 ASSESSMENT — PAIN DESCRIPTION - DESCRIPTORS: DESCRIPTORS: ACHING

## 2019-05-02 ASSESSMENT — PAIN DESCRIPTION - ORIENTATION: ORIENTATION: LEFT

## 2019-05-02 ASSESSMENT — PAIN DESCRIPTION - LOCATION: LOCATION: SHOULDER

## 2019-05-02 NOTE — ED PROVIDER NOTES
open area. Eyes: Pupils are equal, round, and reactive to light. Conjunctivae, EOM and lids are normal.   Neck: Normal range of motion and full passive range of motion without pain. Neck supple. No spinous process tenderness and no muscular tenderness present. Normal range of motion present. Cardiovascular: Normal rate, regular rhythm and normal heart sounds. No murmur heard. Pulmonary/Chest: Effort normal and breath sounds normal.   Abdominal: Soft. Bowel sounds are normal. There is no tenderness. There is no rigidity, no rebound, no guarding and no CVA tenderness. Musculoskeletal: She exhibits no edema. Tenderness to left shoulder area has full range of motion but tenderness with palpation. No other extremity pain with palpation. No midline back tenderness no hip pain or other extremity pain. He is normal    Neurological: She is alert and oriented to person, place, and time. She has normal strength. No cranial nerve deficit or sensory deficit. Coordination and gait normal. GCS eye subscore is 4. GCS verbal subscore is 5. GCS motor subscore is 6. Skin: Skin is warm and dry. No abrasion and no rash noted. Nursing note and vitals reviewed. Procedures    MDM  Number of Diagnoses or Management Options  Contusion of lip, initial encounter:   Strain of left shoulder, initial encounter:   Diagnosis management comments: X-ray was negative may go back to work.  No restrictions.           --------------------------------------------- PAST HISTORY ---------------------------------------------  Past Medical History:  has a past medical history of Arthritis, CAD (coronary artery disease), Cerebrovascular disease, Chronic back pain, Congenital heart disease, CVA (cerebral vascular accident) ([facility] Depression, Fracture of right shoulder, Frequent UTI, GERD (gastroesophageal reflux disease), H/O ligation of vein, Hiatal hernia, History of thoracic outlet syndrome, Hypercholesterolemia, Hypertension, Hypothyroidism, Kidney stones, Nephrolithiasis, Nonischemic cardiomyopathy ([facility] [address] [address] ROSALVA (obstructive sleep apnea), Osteoarthritis, PFO (patent foramen ovale), Pneumonia, and Thyroid disease. Past Surgical History:  has a past surgical history that includes Diagnostic Cardiac Cath Lab Procedure (5/17/1988); Diagnostic Cardiac Cath Lab Procedure (6/10/1992); Diagnostic Cardiac Cath Lab Procedure (10/26/2001); Diagnostic Cardiac Cath Lab Procedure (12/7/2005); Tonsillectomy; Middle ear surgery (1987); Hysterectomy (1983); vascular surgery; Lithotripsy; Urethra surgery; Colonoscopy (11/04); Colonoscopy (09/17/2013); Colonoscopy (12/30/2016); and Cardiac catheterization (03/05/2018). Social History:  reports that she quit smoking about 20 years ago. Her smoking use included cigarettes. She started smoking about 44 years ago. She has a 72.00 pack-year smoking history. She has never used smokeless tobacco. She reports that she does not drink alcohol or use drugs. Family History: family history includes Alzheimer's Disease in her mother; Coronary Art Dis in her mother; No Known Problems in her father. The patients home medications have been reviewed. Allergies: Biaxin [clarithromycin]; Codeine; Neosporin [bacitracin-neomycin-polymyxin]; Sulfa antibiotics; Clarithromycin; Neomycin-polymyxin-gramicidin; Septra [sulfamethoxazole-trimethoprim]; Vioxx [rofecoxib]; and Sulfasalazine    -------------------------------------------------- RESULTS -------------------------------------------------  No results found for this visit on 05/02/19. XR SHOULDER LEFT (MIN 2 VIEWS)   Final Result   No acute findings. ------------------------- NURSING NOTES AND VITALS REVIEWED ---------------------------   The nursing notes within the ED encounter and vital signs as below have been reviewed.    /83   Pulse 82   Temp 98.2 °F (36.8 °C) (Oral)   Resp 20   Wt 198 lb (89.8 kg)   SpO2 93%   BMI 39.19 kg/m²   Oxygen Saturation Interpretation: Normal      ------------------------------------------ PROGRESS NOTES ------------------------------------------   I have spoken with the patient and discussed todays results, in addition to providing specific details for the plan of care and counseling regarding the diagnosis and prognosis. Their questions are answered at this time and they are agreeable with the plan.      --------------------------------- ADDITIONAL PROVIDER NOTES ---------------------------------     This patient is stable for discharge. I have shared the specific conditions for return, as well as the importance of follow-up. * NOTE: This report was transcribed using voice recognition software. Every effort was made to ensure accuracy; however, inadvertent computerized transcription errors may be present.    --------------------------------- IMPRESSION AND DISPOSITION ---------------------------------    IMPRESSION  1. Strain of left shoulder, initial encounter    2.  Contusion of lip, initial encounter        DISPOSITION  Disposition: Discharge to home  Patient condition is good         Anabela Dang PA-C  05/02/19 3011

## 2019-06-12 ENCOUNTER — HOSPITAL ENCOUNTER (OUTPATIENT)
Age: 69
Discharge: HOME | End: 2019-06-14
Payer: MEDICARE

## 2019-06-12 LAB
BASOPHILS ABSOLUTE: 0.04 E9/L (ref 0–0.2)
BASOPHILS RELATIVE PERCENT: 0.5 % (ref 0–2)
EOSINOPHILS ABSOLUTE: 0.17 E9/L (ref 0.05–0.5)
EOSINOPHILS RELATIVE PERCENT: 2.3 % (ref 0–6)
HCT VFR BLD CALC: 32.4 % (ref 34–48)
HEMOGLOBIN: 9.9 G/DL (ref 11.5–15.5)
IMMATURE GRANULOCYTES #: 0.07 E9/L
IMMATURE GRANULOCYTES %: 0.9 % (ref 0–5)
LYMPHOCYTES ABSOLUTE: 1.29 E9/L (ref 1.5–4)
LYMPHOCYTES RELATIVE PERCENT: 17.4 % (ref 20–42)
MCH RBC QN AUTO: 26.8 PG (ref 26–35)
MCHC RBC AUTO-ENTMCNC: 30.6 % (ref 32–34.5)
MCV RBC AUTO: 87.6 FL (ref 80–99.9)
MONOCYTES ABSOLUTE: 0.69 E9/L (ref 0.1–0.95)
MONOCYTES RELATIVE PERCENT: 9.3 % (ref 2–12)
NEUTROPHILS ABSOLUTE: 5.15 E9/L (ref 1.8–7.3)
NEUTROPHILS RELATIVE PERCENT: 69.6 % (ref 43–80)
PDW BLD-RTO: 14.4 FL (ref 11.5–15)
PLATELET # BLD: 235 E9/L (ref 130–450)
PMV BLD AUTO: 12 FL (ref 7–12)
RBC # BLD: 3.7 E12/L (ref 3.5–5.5)
WBC # BLD: 7.4 E9/L (ref 4.5–11.5)

## 2019-06-12 PROCEDURE — 85025 COMPLETE CBC W/AUTO DIFF WBC: CPT

## 2019-07-05 ENCOUNTER — HOSPITAL ENCOUNTER (OUTPATIENT)
Age: 69
Discharge: HOME | End: 2019-07-07
Payer: MEDICARE

## 2019-07-05 LAB
BASOPHILS ABSOLUTE: 0.03 E9/L (ref 0–0.2)
BASOPHILS RELATIVE PERCENT: 0.5 % (ref 0–2)
EOSINOPHILS ABSOLUTE: 0.11 E9/L (ref 0.05–0.5)
EOSINOPHILS RELATIVE PERCENT: 1.7 % (ref 0–6)
FOLATE: >20 NG/ML (ref 4.8–24.2)
HCT VFR BLD CALC: 33.1 % (ref 34–48)
HEMOGLOBIN: 10.1 G/DL (ref 11.5–15.5)
IMMATURE GRANULOCYTES #: 0.04 E9/L
IMMATURE GRANULOCYTES %: 0.6 % (ref 0–5)
IMMATURE RETIC FRACT: 15 % (ref 3–15.9)
IRON SATURATION: 16 % (ref 15–50)
IRON: 43 MCG/DL (ref 37–145)
LYMPHOCYTES ABSOLUTE: 0.84 E9/L (ref 1.5–4)
LYMPHOCYTES RELATIVE PERCENT: 13.1 % (ref 20–42)
MCH RBC QN AUTO: 26.7 PG (ref 26–35)
MCHC RBC AUTO-ENTMCNC: 30.5 % (ref 32–34.5)
MCV RBC AUTO: 87.6 FL (ref 80–99.9)
MONOCYTES ABSOLUTE: 0.48 E9/L (ref 0.1–0.95)
MONOCYTES RELATIVE PERCENT: 7.5 % (ref 2–12)
NEUTROPHILS ABSOLUTE: 4.93 E9/L (ref 1.8–7.3)
NEUTROPHILS RELATIVE PERCENT: 76.6 % (ref 43–80)
PDW BLD-RTO: 14.6 FL (ref 11.5–15)
PLATELET # BLD: 178 E9/L (ref 130–450)
PMV BLD AUTO: 12.2 FL (ref 7–12)
RBC # BLD: 3.78 E12/L (ref 3.5–5.5)
RETIC HGB EQUIVALENT: 29.5 PG (ref 28.2–36.6)
RETICULOCYTE ABSOLUTE COUNT: 0.07 E12/L
RETICULOCYTE COUNT PCT: 1.9 % (ref 0.4–1.9)
TOTAL IRON BINDING CAPACITY: 275 MCG/DL (ref 250–450)
VITAMIN B-12: 649 PG/ML (ref 211–946)
WBC # BLD: 6.4 E9/L (ref 4.5–11.5)

## 2019-07-05 PROCEDURE — 85025 COMPLETE CBC W/AUTO DIFF WBC: CPT

## 2019-07-05 PROCEDURE — 83550 IRON BINDING TEST: CPT

## 2019-07-05 PROCEDURE — 82746 ASSAY OF FOLIC ACID SERUM: CPT

## 2019-07-05 PROCEDURE — 83540 ASSAY OF IRON: CPT

## 2019-07-05 PROCEDURE — 82607 VITAMIN B-12: CPT

## 2019-07-05 PROCEDURE — 85045 AUTOMATED RETICULOCYTE COUNT: CPT

## 2019-07-08 ENCOUNTER — HOSPITAL ENCOUNTER (EMERGENCY)
Age: 69
Discharge: HOME | End: 2019-07-08
Payer: MEDICARE

## 2019-07-08 ENCOUNTER — APPOINTMENT (OUTPATIENT)
Dept: ULTRASOUND IMAGING | Age: 69
End: 2019-07-08
Payer: MEDICARE

## 2019-07-08 VITALS
SYSTOLIC BLOOD PRESSURE: 132 MMHG | DIASTOLIC BLOOD PRESSURE: 66 MMHG | HEIGHT: 59 IN | HEART RATE: 82 BPM | WEIGHT: 195 LBS | TEMPERATURE: 98.2 F | BODY MASS INDEX: 39.31 KG/M2 | RESPIRATION RATE: 18 BRPM | OXYGEN SATURATION: 98 %

## 2019-07-08 DIAGNOSIS — I80.01 THROMBOPHLEBITIS OF SUPERFICIAL VEINS OF RIGHT LOWER EXTREMITY: Primary | ICD-10-CM

## 2019-07-08 PROCEDURE — 99283 EMERGENCY DEPT VISIT LOW MDM: CPT

## 2019-07-08 PROCEDURE — 93971 EXTREMITY STUDY: CPT

## 2019-07-24 PROBLEM — I80.01 SUPERFICIAL PHLEBITIS AND THROMBOPHLEBITIS OF RIGHT LOWER EXTREMITY: Status: ACTIVE | Noted: 2019-07-24

## 2019-09-20 ENCOUNTER — HOSPITAL ENCOUNTER (OUTPATIENT)
Dept: ULTRASOUND IMAGING | Age: 69
Discharge: HOME | End: 2019-09-22
Payer: MEDICARE

## 2019-09-20 DIAGNOSIS — M79.89 SWELLING OF LOWER EXTREMITY: ICD-10-CM

## 2019-09-20 PROCEDURE — 93971 EXTREMITY STUDY: CPT

## 2019-10-14 ENCOUNTER — HOSPITAL ENCOUNTER (OUTPATIENT)
Age: 69
Discharge: HOME | End: 2019-10-14
Payer: MEDICARE

## 2019-10-14 LAB
ALBUMIN SERPL-MCNC: 4.4 G/DL (ref 3.5–5.2)
ANION GAP SERPL CALCULATED.3IONS-SCNC: 16 MMOL/L (ref 7–16)
BASOPHILS ABSOLUTE: 0.04 E9/L (ref 0–0.2)
BASOPHILS RELATIVE PERCENT: 0.6 % (ref 0–2)
BUN BLDV-MCNC: 16 MG/DL (ref 8–23)
CALCIUM SERPL-MCNC: 9.6 MG/DL (ref 8.6–10.2)
CHLORIDE BLD-SCNC: 100 MMOL/L (ref 98–107)
CO2: 25 MMOL/L (ref 22–29)
CREAT SERPL-MCNC: 0.7 MG/DL (ref 0.5–1)
EOSINOPHILS ABSOLUTE: 0.15 E9/L (ref 0.05–0.5)
EOSINOPHILS RELATIVE PERCENT: 2.1 % (ref 0–6)
FOLATE: >20 NG/ML (ref 4.8–24.2)
GFR AFRICAN AMERICAN: >60
GFR NON-AFRICAN AMERICAN: >60 ML/MIN/1.73
GLUCOSE BLD-MCNC: 89 MG/DL (ref 74–99)
HCT VFR BLD CALC: 35.1 % (ref 34–48)
HEMOGLOBIN: 10.9 G/DL (ref 11.5–15.5)
IMMATURE GRANULOCYTES #: 0.07 E9/L
IMMATURE GRANULOCYTES %: 1 % (ref 0–5)
IMMATURE RETIC FRACT: 20.1 % (ref 3–15.9)
IRON SATURATION: 18 % (ref 15–50)
IRON: 53 MCG/DL (ref 37–145)
LYMPHOCYTES ABSOLUTE: 1.31 E9/L (ref 1.5–4)
LYMPHOCYTES RELATIVE PERCENT: 18.1 % (ref 20–42)
MCH RBC QN AUTO: 26.2 PG (ref 26–35)
MCHC RBC AUTO-ENTMCNC: 31.1 % (ref 32–34.5)
MCV RBC AUTO: 84.4 FL (ref 80–99.9)
MONOCYTES ABSOLUTE: 0.7 E9/L (ref 0.1–0.95)
MONOCYTES RELATIVE PERCENT: 9.7 % (ref 2–12)
NEUTROPHILS ABSOLUTE: 4.97 E9/L (ref 1.8–7.3)
NEUTROPHILS RELATIVE PERCENT: 68.5 % (ref 43–80)
PDW BLD-RTO: 14.8 FL (ref 11.5–15)
PHOSPHORUS: 3.9 MG/DL (ref 2.5–4.5)
PLATELET # BLD: 240 E9/L (ref 130–450)
PMV BLD AUTO: 11.2 FL (ref 7–12)
POTASSIUM SERPL-SCNC: 4.3 MMOL/L (ref 3.5–5)
RBC # BLD: 4.16 E12/L (ref 3.5–5.5)
RETIC HGB EQUIVALENT: 30.4 PG (ref 28.2–36.6)
RETICULOCYTE ABSOLUTE COUNT: 0.09 E12/L
RETICULOCYTE COUNT PCT: 2.2 % (ref 0.4–1.9)
SODIUM BLD-SCNC: 141 MMOL/L (ref 132–146)
TOTAL IRON BINDING CAPACITY: 293 MCG/DL (ref 250–450)
VITAMIN B-12: 709 PG/ML (ref 211–946)
WBC # BLD: 7.2 E9/L (ref 4.5–11.5)

## 2019-10-14 PROCEDURE — 36415 COLL VENOUS BLD VENIPUNCTURE: CPT

## 2019-10-14 PROCEDURE — 82747 ASSAY OF FOLIC ACID RBC: CPT

## 2019-10-14 PROCEDURE — 83540 ASSAY OF IRON: CPT

## 2019-10-14 PROCEDURE — 83550 IRON BINDING TEST: CPT

## 2019-10-14 PROCEDURE — 85045 AUTOMATED RETICULOCYTE COUNT: CPT

## 2019-10-14 PROCEDURE — 80069 RENAL FUNCTION PANEL: CPT

## 2019-10-14 PROCEDURE — 85025 COMPLETE CBC W/AUTO DIFF WBC: CPT

## 2019-10-14 PROCEDURE — 82746 ASSAY OF FOLIC ACID SERUM: CPT

## 2019-10-14 PROCEDURE — 82607 VITAMIN B-12: CPT

## 2019-10-16 LAB
HCT VFR BLD CALC: 35.1 %
RBC FOLATE: NORMAL NG/ML

## 2019-11-11 ENCOUNTER — HOSPITAL ENCOUNTER (OUTPATIENT)
Age: 69
Discharge: HOME | End: 2019-11-13
Payer: MEDICARE

## 2019-11-11 DIAGNOSIS — I25.10 CORONARY ARTERY DISEASE INVOLVING NATIVE HEART WITHOUT ANGINA PECTORIS, UNSPECIFIED VESSEL OR LESION TYPE: ICD-10-CM

## 2019-11-11 DIAGNOSIS — E03.9 ACQUIRED HYPOTHYROIDISM: ICD-10-CM

## 2019-11-11 PROBLEM — F33.2 SEVERE EPISODE OF RECURRENT MAJOR DEPRESSIVE DISORDER, WITHOUT PSYCHOTIC FEATURES (HCC): Status: ACTIVE | Noted: 2019-11-11

## 2019-11-11 LAB
ALBUMIN SERPL-MCNC: 4.6 G/DL (ref 3.5–5.2)
ALP BLD-CCNC: 80 U/L (ref 35–104)
ALT SERPL-CCNC: 15 U/L (ref 0–32)
ANION GAP SERPL CALCULATED.3IONS-SCNC: 19 MMOL/L (ref 7–16)
AST SERPL-CCNC: 22 U/L (ref 0–31)
BASOPHILS ABSOLUTE: 0.02 E9/L (ref 0–0.2)
BASOPHILS RELATIVE PERCENT: 0.3 % (ref 0–2)
BILIRUB SERPL-MCNC: 0.3 MG/DL (ref 0–1.2)
BUN BLDV-MCNC: 17 MG/DL (ref 8–23)
CALCIUM SERPL-MCNC: 9.6 MG/DL (ref 8.6–10.2)
CHLORIDE BLD-SCNC: 100 MMOL/L (ref 98–107)
CHOLESTEROL, TOTAL: 176 MG/DL (ref 0–199)
CO2: 23 MMOL/L (ref 22–29)
CREAT SERPL-MCNC: 0.6 MG/DL (ref 0.5–1)
EOSINOPHILS ABSOLUTE: 0.11 E9/L (ref 0.05–0.5)
EOSINOPHILS RELATIVE PERCENT: 1.8 % (ref 0–6)
GFR AFRICAN AMERICAN: >60
GFR NON-AFRICAN AMERICAN: >60 ML/MIN/1.73
GLUCOSE BLD-MCNC: 94 MG/DL (ref 74–99)
HCT VFR BLD CALC: 35 % (ref 34–48)
HDLC SERPL-MCNC: 51 MG/DL
HEMOGLOBIN: 10.2 G/DL (ref 11.5–15.5)
IMMATURE GRANULOCYTES #: 0.04 E9/L
IMMATURE GRANULOCYTES %: 0.6 % (ref 0–5)
LDL CHOLESTEROL CALCULATED: 97 MG/DL (ref 0–99)
LYMPHOCYTES ABSOLUTE: 0.86 E9/L (ref 1.5–4)
LYMPHOCYTES RELATIVE PERCENT: 13.8 % (ref 20–42)
MCH RBC QN AUTO: 25.2 PG (ref 26–35)
MCHC RBC AUTO-ENTMCNC: 29.1 % (ref 32–34.5)
MCV RBC AUTO: 86.4 FL (ref 80–99.9)
MONOCYTES ABSOLUTE: 0.46 E9/L (ref 0.1–0.95)
MONOCYTES RELATIVE PERCENT: 7.4 % (ref 2–12)
NEUTROPHILS ABSOLUTE: 4.76 E9/L (ref 1.8–7.3)
NEUTROPHILS RELATIVE PERCENT: 76.1 % (ref 43–80)
PDW BLD-RTO: 15 FL (ref 11.5–15)
PLATELET # BLD: 235 E9/L (ref 130–450)
PMV BLD AUTO: 11.5 FL (ref 7–12)
POTASSIUM SERPL-SCNC: 4.5 MMOL/L (ref 3.5–5)
RBC # BLD: 4.05 E12/L (ref 3.5–5.5)
SODIUM BLD-SCNC: 142 MMOL/L (ref 132–146)
TOTAL PROTEIN: 8.3 G/DL (ref 6.4–8.3)
TRIGL SERPL-MCNC: 138 MG/DL (ref 0–149)
TSH SERPL DL<=0.05 MIU/L-ACNC: 0.98 UIU/ML (ref 0.27–4.2)
VLDLC SERPL CALC-MCNC: 28 MG/DL
WBC # BLD: 6.3 E9/L (ref 4.5–11.5)

## 2019-11-11 PROCEDURE — 84443 ASSAY THYROID STIM HORMONE: CPT

## 2019-11-11 PROCEDURE — 80053 COMPREHEN METABOLIC PANEL: CPT

## 2019-11-11 PROCEDURE — 85025 COMPLETE CBC W/AUTO DIFF WBC: CPT

## 2019-11-11 PROCEDURE — 80061 LIPID PANEL: CPT

## 2020-02-03 ENCOUNTER — HOSPITAL ENCOUNTER (OUTPATIENT)
Age: 70
Discharge: HOME | End: 2020-02-03
Payer: MEDICARE

## 2020-02-03 LAB
ALBUMIN SERPL-MCNC: 4.3 G/DL (ref 3.5–5.2)
ANION GAP SERPL CALCULATED.3IONS-SCNC: 12 MMOL/L (ref 7–16)
BASOPHILS ABSOLUTE: 0.04 E9/L (ref 0–0.2)
BASOPHILS RELATIVE PERCENT: 0.6 % (ref 0–2)
BUN BLDV-MCNC: 16 MG/DL (ref 8–23)
CALCIUM SERPL-MCNC: 9.4 MG/DL (ref 8.6–10.2)
CHLORIDE BLD-SCNC: 97 MMOL/L (ref 98–107)
CO2: 26 MMOL/L (ref 22–29)
CREAT SERPL-MCNC: 0.6 MG/DL (ref 0.5–1)
EOSINOPHILS ABSOLUTE: 0.15 E9/L (ref 0.05–0.5)
EOSINOPHILS RELATIVE PERCENT: 2.1 % (ref 0–6)
GFR AFRICAN AMERICAN: >60
GFR NON-AFRICAN AMERICAN: >60 ML/MIN/1.73
GLUCOSE BLD-MCNC: 172 MG/DL (ref 74–99)
HCT VFR BLD CALC: 34.5 % (ref 34–48)
HEMOGLOBIN: 10.4 G/DL (ref 11.5–15.5)
IMMATURE GRANULOCYTES #: 0.04 E9/L
IMMATURE GRANULOCYTES %: 0.6 % (ref 0–5)
IMMATURE RETIC FRACT: 11 % (ref 3–15.9)
IRON SATURATION: 16 % (ref 15–50)
IRON: 44 MCG/DL (ref 37–145)
LYMPHOCYTES ABSOLUTE: 1.03 E9/L (ref 1.5–4)
LYMPHOCYTES RELATIVE PERCENT: 14.7 % (ref 20–42)
MCH RBC QN AUTO: 25.7 PG (ref 26–35)
MCHC RBC AUTO-ENTMCNC: 30.1 % (ref 32–34.5)
MCV RBC AUTO: 85.4 FL (ref 80–99.9)
MONOCYTES ABSOLUTE: 0.63 E9/L (ref 0.1–0.95)
MONOCYTES RELATIVE PERCENT: 9 % (ref 2–12)
NEUTROPHILS ABSOLUTE: 5.13 E9/L (ref 1.8–7.3)
NEUTROPHILS RELATIVE PERCENT: 73 % (ref 43–80)
PDW BLD-RTO: 14.3 FL (ref 11.5–15)
PHOSPHORUS: 3 MG/DL (ref 2.5–4.5)
PLATELET # BLD: 216 E9/L (ref 130–450)
PMV BLD AUTO: 11.6 FL (ref 7–12)
POTASSIUM SERPL-SCNC: 4.3 MMOL/L (ref 3.5–5)
RBC # BLD: 4.04 E12/L (ref 3.5–5.5)
RETIC HGB EQUIVALENT: 30.2 PG (ref 28.2–36.6)
RETICULOCYTE ABSOLUTE COUNT: 0.08 E12/L
RETICULOCYTE COUNT PCT: 2 % (ref 0.4–1.9)
SODIUM BLD-SCNC: 135 MMOL/L (ref 132–146)
TOTAL IRON BINDING CAPACITY: 267 MCG/DL (ref 250–450)
WBC # BLD: 7 E9/L (ref 4.5–11.5)

## 2020-02-03 PROCEDURE — 85045 AUTOMATED RETICULOCYTE COUNT: CPT

## 2020-02-03 PROCEDURE — 83540 ASSAY OF IRON: CPT

## 2020-02-03 PROCEDURE — 85025 COMPLETE CBC W/AUTO DIFF WBC: CPT

## 2020-02-03 PROCEDURE — 80069 RENAL FUNCTION PANEL: CPT

## 2020-02-03 PROCEDURE — 83550 IRON BINDING TEST: CPT

## 2020-02-03 PROCEDURE — 36415 COLL VENOUS BLD VENIPUNCTURE: CPT

## 2020-05-19 ENCOUNTER — VIRTUAL VISIT (OUTPATIENT)
Dept: CARDIOLOGY CLINIC | Age: 70
End: 2020-05-19
Payer: MEDICARE

## 2020-05-19 VITALS — WEIGHT: 198 LBS | BODY MASS INDEX: 39.92 KG/M2 | HEIGHT: 59 IN

## 2020-05-19 PROCEDURE — 99212 OFFICE O/P EST SF 10 MIN: CPT | Performed by: INTERNAL MEDICINE

## 2020-05-19 RX ORDER — NITROGLYCERIN 0.4 MG/1
0.4 TABLET SUBLINGUAL EVERY 5 MIN PRN
Qty: 25 TABLET | Refills: 3 | Status: SHIPPED
Start: 2020-05-19 | End: 2020-06-16 | Stop reason: SDUPTHER

## 2020-05-19 RX ORDER — MESALAMINE 1.2 G/1
1200 TABLET, DELAYED RELEASE ORAL
COMMUNITY
Start: 2020-03-28

## 2020-05-19 ASSESSMENT — ENCOUNTER SYMPTOMS
VOMITING: 0
DIARRHEA: 0
CONSTIPATION: 0
NAUSEA: 1
BLOOD IN STOOL: 0
SHORTNESS OF BREATH: 0
COUGH: 0
ABDOMINAL PAIN: 0
BACK PAIN: 1
WHEEZING: 0

## 2020-05-19 NOTE — PROGRESS NOTES
upper back pain. Also noted association of shortness of breath and nausea. She denies palpitations, dizziness, syncope or lower extremity edema  Review of Systems:   Review of Systems   Constitutional: Positive for fatigue. Negative for chills and fever. HENT: Negative for nosebleeds. Respiratory: Negative for cough, shortness of breath and wheezing. Gastrointestinal: Positive for nausea. Negative for abdominal pain, blood in stool, constipation, diarrhea and vomiting. Genitourinary: Negative for dysuria and hematuria. Musculoskeletal: Positive for back pain. Negative for joint swelling and myalgias. Neurological: Negative for syncope and light-headedness. Psychiatric/Behavioral: The patient is not nervous/anxious. Due to this being a TeleHealth encounter, evaluation of the following organ systems is limited: Vitals/Constitutional/EENT/Resp/CV/GI//MS/Neuro/Skin/Heme-Lymph-Imm. Past Medical History:  Past Medical History:   Diagnosis Date    Arthritis     CAD (coronary artery disease)     Cerebrovascular disease     Chronic back pain     Congenital heart disease     CVA (cerebral vascular accident) ([facility] [address] 6/6164    Embolic. ? tia?  Depression     Fracture of right shoulder 1/2011    Frequent UTI     GERD (gastroesophageal reflux disease)     H/O ligation of vein 1974 Bilaterally.  Hiatal hernia 2000    History of thoracic outlet syndrome     Hypercholesterolemia     Hypertension     Hypothyroidism     Kidney stones     Nephrolithiasis     Bilaterally.  Nonischemic cardiomyopathy ([facility] [address] 6/25/2018    ROSALVA (obstructive sleep apnea) 04/16/2018    mild    Osteoarthritis     PFO (patent foramen ovale)     Pneumonia 1989    Thyroid disease     Hypothyroidism. Past Surgical History:  Past Surgical History:   Procedure Laterality Date    CARDIAC CATHETERIZATION  03/05/2018    No significant CAD  (Dr Rima Avelar)    COLONOSCOPY  11/04    Negative.     COLONOSCOPY on file     Active member of club or organization: Not on file     Attends meetings of clubs or organizations: Not on file     Relationship status: Not on file    Intimate partner violence     Fear of current or ex partner: Not on file     Emotionally abused: Not on file     Physically abused: Not on file     Forced sexual activity: Not on file   Other Topics Concern    Not on file   Social History Narrative    Not on file       Allergies: Allergies   Allergen Reactions    Biaxin [Clarithromycin] Nausea And Vomiting and Other (See Comments)     Abdominal pain    Codeine Nausea And Vomiting    Neosporin [Bacitracin-Neomycin-Polymyxin] Swelling    Sulfa Antibiotics Itching and Nausea And Vomiting     decrease in Blood pressure.  Clarithromycin Other (See Comments)    Neomycin-Polymyxin-Gramicidin Swelling     redness    Septra [Sulfamethoxazole-Trimethoprim]     Vioxx [Rofecoxib]     Sulfasalazine Nausea And Vomiting     severe headaches.        Current Medications:  Current Outpatient Medications   Medication Sig Dispense Refill    mesalamine (LIALDA) 1.2 g EC tablet TAKE 1 TABLET BY MOUTH DAILY      nitroGLYCERIN (NITROSTAT) 0.4 MG SL tablet Place 1 tablet under the tongue every 5 minutes as needed for Chest pain 25 tablet 3    levothyroxine (SYNTHROID) 100 MCG tablet Take 1 tablet by mouth Daily 90 tablet 0    fluticasone (FLONASE) 50 MCG/ACT nasal spray 2 sprays by Nasal route every morning 1 Bottle 5    carvedilol (COREG) 3.125 MG tablet Take 1 tablet by mouth 2 times daily (with meals) 180 tablet 1    omeprazole (PRILOSEC) 20 MG delayed release capsule Take 1 capsule by mouth 2 times daily 180 capsule 1    nitroGLYCERIN (NITROSTAT) 0.4 MG SL tablet Place 1 tablet under the tongue every 5 minutes as needed for Chest pain 25 tablet 3    sertraline (ZOLOFT) 50 MG tablet Take 1 tablet by mouth daily 90 tablet 1    simvastatin (ZOCOR) 20 MG tablet Take 1 tablet by mouth nightly 90 tablet 1    sucralfate (CARAFATE) 1 GM tablet Take 1 tablet by mouth 2 times daily 180 tablet 1    aspirin 81 MG EC tablet Take 1 tablet by mouth daily 30 tablet 3    ascorbic acid (VITAMIN C) 500 MG tablet Take 500 mg by mouth daily.  multivitamin-iron-minerals-folic acid (CENTRUM) chewable tablet Take 1 tablet by mouth daily. No current facility-administered medications for this visit. Physical Exam:  Ht 4' 11\" (1.499 m)   Wt 198 lb (89.8 kg)   BMI 39.99 kg/m²   Wt Readings from Last 3 Encounters:   05/19/20 198 lb (89.8 kg)   11/11/19 201 lb 6.4 oz (91.4 kg)   07/24/19 200 lb 3.2 oz (90.8 kg)       Physical Exam    Not performed due to COVID-19 pandemic.     Laboratory Tests:  Lab Results   Component Value Date    CREATININE 0.6 02/03/2020    BUN 16 02/03/2020     02/03/2020    K 4.3 02/03/2020    CL 97 (L) 02/03/2020    CO2 26 02/03/2020     No results found for: MG  Lab Results   Component Value Date    WBC 7.0 02/03/2020    HGB 10.4 (L) 02/03/2020    HCT 34.5 02/03/2020    MCV 85.4 02/03/2020     02/03/2020     Lab Results   Component Value Date    ALT 15 11/11/2019    AST 22 11/11/2019    ALKPHOS 80 11/11/2019    BILITOT 0.3 11/11/2019     Lab Results   Component Value Date    CKTOTAL 38 04/23/2011    TROPONINI <0.01 10/11/2014     Lab Results   Component Value Date    INR 1.0 10/11/2014    INR 1.1 04/22/2011    PROTIME 10.5 10/11/2014    PROTIME 11.9 04/22/2011     Lab Results   Component Value Date    TSH 0.982 11/11/2019     No results found for: LABA1C  No results found for: EAG  Lab Results   Component Value Date    CHOL 176 11/11/2019    CHOL 201 (H) 03/22/2019    CHOL 186 07/17/2018     Lab Results   Component Value Date    TRIG 138 11/11/2019    TRIG 171 (H) 03/22/2019    TRIG 179 (H) 07/17/2018     Lab Results   Component Value Date    HDL 51 11/11/2019    HDL 50 03/22/2019    HDL 44 07/17/2018     Lab Results   Component Value Date    LDLCALC 97 11/11/2019    LDLCALC 117 (H) 03/22/2019    LDLCALC 106 (H) 07/17/2018     Lab Results   Component Value Date    LABVLDL 28 11/11/2019    LABVLDL 34 03/22/2019    LABVLDL 36 07/17/2018       Cardiac Tests:    SARAH done on 7/9/2018:  SARAH terminated early due to significant O2 desaturations. Low normal left ventricular ejection fraction. No evidence of a left atrial appendage thrombus. PFO with mild degree of right to left interatrial shunting on bubble   study. Mild mitral regurgitation. Mild aortic regurgitation. Mild-moderate tricuspid regurgitation. RV-RA gradient is estimated at 27 mmHg. ASSESSMENT / PLAN:  -Chest pain: Be due to new onset angina, GERD/hiatal hernia or musculoskeletal in origin.  -History of mild CAD. -Hypertension.  -Hyperlipidemia.  -History of PFO.  -History of CVA. -Valvular heart disease.  -Hypothyroidism.  -Mild obstructive sleep apnea. -History of diverticulosis and colitis. -Depression.  -Obesity. We will continue aspirin, simvastatin and Coreg. Continue Prilosec and Carafate. We will arrange for the patient to have a Lexiscan to rule out significant myocardial ischemia. Will give patient a prescription of sublingual nitroglycerin as needed chest pain. Patient was informed to seek medical attention if requiring more than 3 tablets. Will follow up after obtaining the [facility]. The patient's current medication list, allergies, problem list and results of all previously ordered testing were reviewed at today's visit. {  Khadar Rubio MD , [facility].   [facility]) Cardiology

## 2020-06-01 ENCOUNTER — TELEPHONE (OUTPATIENT)
Dept: CARDIOLOGY | Age: 70
End: 2020-06-01

## 2020-06-03 ENCOUNTER — HOSPITAL ENCOUNTER (OUTPATIENT)
Dept: CARDIOLOGY | Age: 70
Discharge: HOME | End: 2020-06-03
Payer: MEDICARE

## 2020-06-03 ENCOUNTER — TELEPHONE (OUTPATIENT)
Dept: CARDIOLOGY | Age: 70
End: 2020-06-03

## 2020-06-03 VITALS
BODY MASS INDEX: 39.92 KG/M2 | HEART RATE: 92 BPM | WEIGHT: 198 LBS | DIASTOLIC BLOOD PRESSURE: 80 MMHG | HEIGHT: 59 IN | SYSTOLIC BLOOD PRESSURE: 124 MMHG

## 2020-06-03 PROCEDURE — 78452 HT MUSCLE IMAGE SPECT MULT: CPT

## 2020-06-03 PROCEDURE — 3430000000 HC RX DIAGNOSTIC RADIOPHARMACEUTICAL: Performed by: INTERNAL MEDICINE

## 2020-06-03 PROCEDURE — 78452 HT MUSCLE IMAGE SPECT MULT: CPT | Performed by: INTERNAL MEDICINE

## 2020-06-03 PROCEDURE — 93016 CV STRESS TEST SUPVJ ONLY: CPT | Performed by: INTERNAL MEDICINE

## 2020-06-03 PROCEDURE — 93018 CV STRESS TEST I&R ONLY: CPT | Performed by: INTERNAL MEDICINE

## 2020-06-03 PROCEDURE — A9502 TC99M TETROFOSMIN: HCPCS | Performed by: INTERNAL MEDICINE

## 2020-06-03 PROCEDURE — 93017 CV STRESS TEST TRACING ONLY: CPT

## 2020-06-03 PROCEDURE — 6360000002 HC RX W HCPCS: Performed by: INTERNAL MEDICINE

## 2020-06-03 PROCEDURE — 2580000003 HC RX 258: Performed by: INTERNAL MEDICINE

## 2020-06-03 RX ORDER — SODIUM CHLORIDE 0.9 % (FLUSH) 0.9 %
10 SYRINGE (ML) INJECTION PRN
Status: DISCONTINUED | OUTPATIENT
Start: 2020-06-03 | End: 2020-06-04 | Stop reason: HOSPADM

## 2020-06-03 RX ADMIN — SODIUM CHLORIDE, PRESERVATIVE FREE 10 ML: 5 INJECTION INTRAVENOUS at 09:52

## 2020-06-03 RX ADMIN — TETROFOSMIN 7.9 MILLICURIE: 0.23 INJECTION, POWDER, LYOPHILIZED, FOR SOLUTION INTRAVENOUS at 09:52

## 2020-06-03 RX ADMIN — REGADENOSON 0.4 MG: 0.08 INJECTION, SOLUTION INTRAVENOUS at 11:06

## 2020-06-03 RX ADMIN — SODIUM CHLORIDE, PRESERVATIVE FREE 10 ML: 5 INJECTION INTRAVENOUS at 11:06

## 2020-06-03 RX ADMIN — SODIUM CHLORIDE, PRESERVATIVE FREE 10 ML: 5 INJECTION INTRAVENOUS at 11:05

## 2020-06-03 RX ADMIN — TETROFOSMIN 28.4 MILLICURIE: 0.23 INJECTION, POWDER, LYOPHILIZED, FOR SOLUTION INTRAVENOUS at 11:06

## 2020-06-09 ENCOUNTER — TELEPHONE (OUTPATIENT)
Dept: CARDIOLOGY CLINIC | Age: 70
End: 2020-06-09

## 2020-06-09 NOTE — TELEPHONE ENCOUNTER
We see that you have an appointment scheduled, given the current COVID-19 pandemic, Dr. Natalya Curtis  would like to change that visit to a video visit. In order to schedule this appointment you will need a camera-enabled device (smart phone, tablet or computer) and reliable WiFi. Do you wish to schedule this appointment? YES    We want to confirm that, for purposes of billing, this is a virtual visit with your provider for which we will submit a claim for reimbursement with your insurance company. You will be responsible for any copays, coinsurance amounts or other amounts not covered by your insurance company. If you do not accept this, unfortunately we will not be able to schedule a virtual visit with the provider. Do you accept?  YES

## 2020-06-12 ENCOUNTER — HOSPITAL ENCOUNTER (OUTPATIENT)
Age: 70
Discharge: HOME | End: 2020-06-12
Payer: MEDICARE

## 2020-06-12 LAB
ALBUMIN SERPL-MCNC: 4.3 G/DL (ref 3.5–5.2)
ANION GAP SERPL CALCULATED.3IONS-SCNC: 13 MMOL/L (ref 7–16)
BASOPHILS ABSOLUTE: 0.02 E9/L (ref 0–0.2)
BASOPHILS RELATIVE PERCENT: 0.3 % (ref 0–2)
BUN BLDV-MCNC: 13 MG/DL (ref 8–23)
CALCIUM SERPL-MCNC: 9.2 MG/DL (ref 8.6–10.2)
CHLORIDE BLD-SCNC: 101 MMOL/L (ref 98–107)
CO2: 29 MMOL/L (ref 22–29)
CREAT SERPL-MCNC: 0.6 MG/DL (ref 0.5–1)
EOSINOPHILS ABSOLUTE: 0.13 E9/L (ref 0.05–0.5)
EOSINOPHILS RELATIVE PERCENT: 1.8 % (ref 0–6)
GFR AFRICAN AMERICAN: >60
GFR NON-AFRICAN AMERICAN: >60 ML/MIN/1.73
GLUCOSE BLD-MCNC: 110 MG/DL (ref 74–99)
HCT VFR BLD CALC: 32.6 % (ref 34–48)
HEMOGLOBIN: 10.1 G/DL (ref 11.5–15.5)
IMMATURE GRANULOCYTES #: 0.06 E9/L
IMMATURE GRANULOCYTES %: 0.8 % (ref 0–5)
IMMATURE RETIC FRACT: 15.1 % (ref 3–15.9)
IRON SATURATION: 19 % (ref 15–50)
IRON: 51 MCG/DL (ref 37–145)
LYMPHOCYTES ABSOLUTE: 1.07 E9/L (ref 1.5–4)
LYMPHOCYTES RELATIVE PERCENT: 15.2 % (ref 20–42)
MCH RBC QN AUTO: 26.4 PG (ref 26–35)
MCHC RBC AUTO-ENTMCNC: 31 % (ref 32–34.5)
MCV RBC AUTO: 85.3 FL (ref 80–99.9)
MONOCYTES ABSOLUTE: 0.67 E9/L (ref 0.1–0.95)
MONOCYTES RELATIVE PERCENT: 9.5 % (ref 2–12)
NEUTROPHILS ABSOLUTE: 5.11 E9/L (ref 1.8–7.3)
NEUTROPHILS RELATIVE PERCENT: 72.4 % (ref 43–80)
PDW BLD-RTO: 14.3 FL (ref 11.5–15)
PHOSPHORUS: 3.1 MG/DL (ref 2.5–4.5)
PLATELET # BLD: 220 E9/L (ref 130–450)
PMV BLD AUTO: 11 FL (ref 7–12)
POTASSIUM SERPL-SCNC: 3.9 MMOL/L (ref 3.5–5)
RBC # BLD: 3.82 E12/L (ref 3.5–5.5)
RETIC HGB EQUIVALENT: 30.2 PG (ref 28.2–36.6)
RETICULOCYTE ABSOLUTE COUNT: 0.08 E12/L
RETICULOCYTE COUNT PCT: 2.1 % (ref 0.4–1.9)
SODIUM BLD-SCNC: 143 MMOL/L (ref 132–146)
TOTAL IRON BINDING CAPACITY: 268 MCG/DL (ref 250–450)
WBC # BLD: 7.1 E9/L (ref 4.5–11.5)

## 2020-06-12 PROCEDURE — 85025 COMPLETE CBC W/AUTO DIFF WBC: CPT

## 2020-06-12 PROCEDURE — 36415 COLL VENOUS BLD VENIPUNCTURE: CPT

## 2020-06-12 PROCEDURE — 80069 RENAL FUNCTION PANEL: CPT

## 2020-06-12 PROCEDURE — 83540 ASSAY OF IRON: CPT

## 2020-06-12 PROCEDURE — 85045 AUTOMATED RETICULOCYTE COUNT: CPT

## 2020-06-12 PROCEDURE — 83550 IRON BINDING TEST: CPT

## 2020-06-16 ENCOUNTER — VIRTUAL VISIT (OUTPATIENT)
Dept: CARDIOLOGY CLINIC | Age: 70
End: 2020-06-16
Payer: MEDICARE

## 2020-06-16 ENCOUNTER — TELEPHONE (OUTPATIENT)
Dept: CARDIOLOGY CLINIC | Age: 70
End: 2020-06-16

## 2020-06-16 PROCEDURE — 99442 PR PHYS/QHP TELEPHONE EVALUATION 11-20 MIN: CPT | Performed by: INTERNAL MEDICINE

## 2020-06-16 RX ORDER — ISOSORBIDE MONONITRATE 30 MG/1
30 TABLET, EXTENDED RELEASE ORAL DAILY
Qty: 30 TABLET | Refills: 5 | Status: SHIPPED
Start: 2020-06-16 | End: 2021-02-16 | Stop reason: SDUPTHER

## 2020-06-16 ASSESSMENT — ENCOUNTER SYMPTOMS
SHORTNESS OF BREATH: 1
VOMITING: 0
WHEEZING: 0
BACK PAIN: 0
COUGH: 0
NAUSEA: 0
ABDOMINAL PAIN: 0
CONSTIPATION: 0
BLOOD IN STOOL: 0
DIARRHEA: 0

## 2020-06-16 NOTE — PROGRESS NOTES
Adam Castro is a 77 y.o. female evaluated via telephone on 2020. Consent:  She and/or health care decision maker is aware that that she may receive a bill for this telephone service, depending on her insurance coverage, and has provided verbal consent to proceed: Yes      Documentation:  I communicated with the patient about her chest pain and an abnormal Lexiscan. Details of this discussion including any medical advice provided: As outlined below in the plan. I affirm this is a Patient Initiated Episode with an Established Patient who has not had a related appointment within my department in the past 7 days or scheduled within the next 24 hours. Total Time: minutes: 11-20 minutes    Note: not billable if this call serves to triage the patient into an appointment for the relevant concern      Jonah Ezioanant Velasquez 67 (:  1950) has requested a phone call evaluation for the following concern(s): Chest pain and follow-up Lexiscan results. Primary Care Physician: Jasmin Cruz DO    Date of Service: 2020    Chief Complaint:   Chief Complaint   Patient presents with    Results     1 month f/u to discuss results of stress done 6/3/20. Last labs 20. Is c/o intermittent mid-sternal chest pain that travels to the left shoulder, sob with & without exertion & some LE edema. Pt states that BP has been elevated the last few days.  Chest Pain    Shortness of Breath    Edema        History of present illness : 70-year-old obese ex-smoker female to who I spoke on the phone today for follow-up visit.   She has history of mild CAD by heart cath in 2018, hypertension, PFO, status post CVA in , nonischemic cardiomyopathy with mild systolic dysfunction, mild mitral and aortic regurgitation, mild to moderate tricuspid regurgitation, thoracic outlet syndrome, obstructive sleep apnea, hypothyroidism, hiatal hernia, anemia, colitis and diverticulosis, nephrolithiasis, superficial phlebitis of the right lower extremity and depression. Patient has been complaining of on and off chest discomfort since March of this year. The discomfort occurs once or twice a day. It occurs at rest and patient is not sure if getting worse with activity. The discomfort is described as sharp and heaviness, rated 5/10 intensity and radiates to the left arm at times. She feels short of breath all the time. She thinks that the discomfort has been happening more frequently since March. She feels palpitations at times but denies dizziness, syncope or lower extremity edema. Lujan Copping done on 6/3/2020: Patient felt chest tightness. EKG remained unchanged. Her TID was 1.04. Nuclear imaging revealed moderate size anterior defect of moderate intensity. This defect was reversible at rest.  Gated images revealed severe hypokinesis of the anterior wall with an ejection fraction of 47%. Review of Systems:   Review of Systems   Constitutional: Negative for chills, fatigue and fever. HENT: Negative for nosebleeds. Respiratory: Positive for shortness of breath. Negative for cough and wheezing. Gastrointestinal: Negative for abdominal pain, blood in stool, constipation, diarrhea, nausea and vomiting. Genitourinary: Negative for dysuria and hematuria. Musculoskeletal: Negative for back pain, joint swelling and myalgias. Aching. Neurological: Negative for syncope and light-headedness. Psychiatric/Behavioral: The patient is not nervous/anxious. Due to this being a TeleHealth encounter, evaluation of the following organ systems is limited: Vitals/Constitutional/EENT/Resp/CV/GI//MS/Neuro/Skin/Heme-Lymph-Imm. Past Medical History:  Past Medical History:   Diagnosis Date    Arthritis     CAD (coronary artery disease)     Cerebrovascular disease     Chronic back pain     Congenital heart disease     CVA (cerebral vascular accident) ([facility] [address] 7/9983    Embolic. Inability: Not on file    Transportation needs     Medical: Not on file     Non-medical: Not on file   Tobacco Use    Smoking status: Former Smoker     Packs/day: 3.00     Years: 24.00     Pack years: 72.00     Types: Cigarettes     Start date:      Last attempt to quit: 1999     Years since quittin.4    Smokeless tobacco: Never Used   Substance and Sexual Activity    Alcohol use: No     Alcohol/week: 0.0 standard drinks     Comment: denies caffeine    Drug use: No    Sexual activity: Not on file   Lifestyle    Physical activity     Days per week: Not on file     Minutes per session: Not on file    Stress: Not on file   Relationships    Social connections     Talks on phone: Not on file     Gets together: Not on file     Attends [employer] service: Not on file     Active member of club or organization: Not on file     Attends meetings of clubs or organizations: Not on file     Relationship status: Not on file    Intimate partner violence     Fear of current or ex partner: Not on file     Emotionally abused: Not on file     Physically abused: Not on file     Forced sexual activity: Not on file   Other Topics Concern    Not on file   Social History Narrative    Not on file       Allergies: Allergies   Allergen Reactions    Biaxin [Clarithromycin] Nausea And Vomiting and Other (See Comments)     Abdominal pain    Codeine Nausea And Vomiting    Neosporin [Bacitracin-Neomycin-Polymyxin] Swelling    Sulfa Antibiotics Itching and Nausea And Vomiting     decrease in Blood pressure.  Clarithromycin Other (See Comments)    Neomycin-Polymyxin-Gramicidin Swelling     redness    Septra [Sulfamethoxazole-Trimethoprim]     Vioxx [Rofecoxib]     Sulfasalazine Nausea And Vomiting     severe headaches.        Current Medications:  Current Outpatient Medications   Medication Sig Dispense Refill    sucralfate (CARAFATE) 1 GM tablet Take 1 tablet by mouth 2 times daily Indications: pt had on 20 60

## 2020-06-16 NOTE — TELEPHONE ENCOUNTER
Notified patient with date/time/instructions for C. Patient scheduled for 6/23/20. NPO after midnight. Can take morning medications. Will need to be at [facility] at 6:30am.  To go for COVID and bloodwork on 6/18/20.

## 2020-06-18 ENCOUNTER — HOSPITAL ENCOUNTER (OUTPATIENT)
Age: 70
Discharge: HOME | End: 2020-06-20
Payer: MEDICARE

## 2020-06-18 ENCOUNTER — HOSPITAL ENCOUNTER (OUTPATIENT)
Age: 70
Discharge: HOME | End: 2020-06-18
Payer: MEDICARE

## 2020-06-18 LAB
ALBUMIN SERPL-MCNC: 4.2 G/DL (ref 3.5–5.2)
ALP BLD-CCNC: 85 U/L (ref 35–104)
ALT SERPL-CCNC: 13 U/L (ref 0–32)
ANION GAP SERPL CALCULATED.3IONS-SCNC: 14 MMOL/L (ref 7–16)
AST SERPL-CCNC: 16 U/L (ref 0–31)
BILIRUB SERPL-MCNC: 0.4 MG/DL (ref 0–1.2)
BUN BLDV-MCNC: 15 MG/DL (ref 8–23)
CALCIUM SERPL-MCNC: 9.3 MG/DL (ref 8.6–10.2)
CHLORIDE BLD-SCNC: 102 MMOL/L (ref 98–107)
CO2: 25 MMOL/L (ref 22–29)
CREAT SERPL-MCNC: 0.6 MG/DL (ref 0.5–1)
GFR AFRICAN AMERICAN: >60
GFR NON-AFRICAN AMERICAN: >60 ML/MIN/1.73
GLUCOSE BLD-MCNC: 129 MG/DL (ref 74–99)
HCT VFR BLD CALC: 33.6 % (ref 34–48)
HEMOGLOBIN: 10.5 G/DL (ref 11.5–15.5)
INR BLD: 1
MCH RBC QN AUTO: 26.3 PG (ref 26–35)
MCHC RBC AUTO-ENTMCNC: 31.3 % (ref 32–34.5)
MCV RBC AUTO: 84.2 FL (ref 80–99.9)
PDW BLD-RTO: 14.2 FL (ref 11.5–15)
PLATELET # BLD: 223 E9/L (ref 130–450)
PMV BLD AUTO: 11 FL (ref 7–12)
POTASSIUM SERPL-SCNC: 4.1 MMOL/L (ref 3.5–5)
PROTHROMBIN TIME: 11.1 SEC (ref 9.3–12.4)
RBC # BLD: 3.99 E12/L (ref 3.5–5.5)
SODIUM BLD-SCNC: 141 MMOL/L (ref 132–146)
TOTAL PROTEIN: 8.3 G/DL (ref 6.4–8.3)
WBC # BLD: 6.5 E9/L (ref 4.5–11.5)

## 2020-06-18 PROCEDURE — U0003 INFECTIOUS AGENT DETECTION BY NUCLEIC ACID (DNA OR RNA); SEVERE ACUTE RESPIRATORY SYNDROME CORONAVIRUS 2 (SARS-COV-2) (CORONAVIRUS DISEASE [COVID-19]), AMPLIFIED PROBE TECHNIQUE, MAKING USE OF HIGH THROUGHPUT TECHNOLOGIES AS DESCRIBED BY CMS-2020-01-R: HCPCS

## 2020-06-18 PROCEDURE — 85610 PROTHROMBIN TIME: CPT

## 2020-06-18 PROCEDURE — 36415 COLL VENOUS BLD VENIPUNCTURE: CPT

## 2020-06-18 PROCEDURE — 80053 COMPREHEN METABOLIC PANEL: CPT

## 2020-06-18 PROCEDURE — 85027 COMPLETE CBC AUTOMATED: CPT

## 2020-06-20 LAB
SARS-COV-2: NOT DETECTED
SOURCE: NORMAL

## 2020-06-23 ENCOUNTER — HOSPITAL ENCOUNTER (INPATIENT)
Dept: CARDIAC CATH/INVASIVE PROCEDURES | Age: 70
LOS: 1 days | Discharge: HOME | DRG: 287 | End: 2020-06-23
Attending: INTERNAL MEDICINE | Admitting: INTERNAL MEDICINE
Payer: MEDICARE

## 2020-06-23 VITALS
SYSTOLIC BLOOD PRESSURE: 112 MMHG | DIASTOLIC BLOOD PRESSURE: 57 MMHG | RESPIRATION RATE: 18 BRPM | TEMPERATURE: 96.2 F | BODY MASS INDEX: 40.4 KG/M2 | OXYGEN SATURATION: 93 % | HEART RATE: 65 BPM | WEIGHT: 200 LBS

## 2020-06-23 PROBLEM — I25.10 CAD IN NATIVE ARTERY: Status: ACTIVE | Noted: 2020-06-23

## 2020-06-23 LAB
ABO/RH: NORMAL
ANTIBODY SCREEN: NORMAL
POC ACT LR: 139 SECONDS

## 2020-06-23 PROCEDURE — 6360000002 HC RX W HCPCS

## 2020-06-23 PROCEDURE — 85347 COAGULATION TIME ACTIVATED: CPT

## 2020-06-23 PROCEDURE — C1769 GUIDE WIRE: HCPCS

## 2020-06-23 PROCEDURE — 36415 COLL VENOUS BLD VENIPUNCTURE: CPT

## 2020-06-23 PROCEDURE — 86850 RBC ANTIBODY SCREEN: CPT

## 2020-06-23 PROCEDURE — 93571 IV DOP VEL&/PRESS C FLO 1ST: CPT

## 2020-06-23 PROCEDURE — 4A023N7 MEASUREMENT OF CARDIAC SAMPLING AND PRESSURE, LEFT HEART, PERCUTANEOUS APPROACH: ICD-10-PCS | Performed by: INTERNAL MEDICINE

## 2020-06-23 PROCEDURE — B2111ZZ FLUOROSCOPY OF MULTIPLE CORONARY ARTERIES USING LOW OSMOLAR CONTRAST: ICD-10-PCS | Performed by: INTERNAL MEDICINE

## 2020-06-23 PROCEDURE — 93571 IV DOP VEL&/PRESS C FLO 1ST: CPT | Performed by: INTERNAL MEDICINE

## 2020-06-23 PROCEDURE — 86901 BLOOD TYPING SEROLOGIC RH(D): CPT

## 2020-06-23 PROCEDURE — 2709999900 HC NON-CHARGEABLE SUPPLY

## 2020-06-23 PROCEDURE — 2140000000 HC CCU INTERMEDIATE R&B

## 2020-06-23 PROCEDURE — 93458 L HRT ARTERY/VENTRICLE ANGIO: CPT

## 2020-06-23 PROCEDURE — 2580000003 HC RX 258: Performed by: INTERNAL MEDICINE

## 2020-06-23 PROCEDURE — C1894 INTRO/SHEATH, NON-LASER: HCPCS

## 2020-06-23 PROCEDURE — C1887 CATHETER, GUIDING: HCPCS

## 2020-06-23 PROCEDURE — 93458 L HRT ARTERY/VENTRICLE ANGIO: CPT | Performed by: INTERNAL MEDICINE

## 2020-06-23 PROCEDURE — 86900 BLOOD TYPING SEROLOGIC ABO: CPT

## 2020-06-23 PROCEDURE — 2500000003 HC RX 250 WO HCPCS

## 2020-06-23 PROCEDURE — 4A033BC MEASUREMENT OF ARTERIAL PRESSURE, CORONARY, PERCUTANEOUS APPROACH: ICD-10-PCS | Performed by: INTERNAL MEDICINE

## 2020-06-23 RX ORDER — SODIUM CHLORIDE 0.9 % (FLUSH) 0.9 %
10 SYRINGE (ML) INJECTION EVERY 12 HOURS SCHEDULED
Status: DISCONTINUED | OUTPATIENT
Start: 2020-06-23 | End: 2020-06-23 | Stop reason: SDUPTHER

## 2020-06-23 RX ORDER — ACETAMINOPHEN 325 MG/1
650 TABLET ORAL EVERY 4 HOURS PRN
Status: DISCONTINUED | OUTPATIENT
Start: 2020-06-23 | End: 2020-06-23 | Stop reason: SDUPTHER

## 2020-06-23 RX ORDER — SODIUM CHLORIDE 0.9 % (FLUSH) 0.9 %
10 SYRINGE (ML) INJECTION PRN
Status: DISCONTINUED | OUTPATIENT
Start: 2020-06-23 | End: 2020-06-24 | Stop reason: HOSPADM

## 2020-06-23 RX ORDER — SODIUM CHLORIDE 9 MG/ML
INJECTION, SOLUTION INTRAVENOUS CONTINUOUS
Status: ACTIVE | OUTPATIENT
Start: 2020-06-23 | End: 2020-06-23

## 2020-06-23 RX ORDER — SODIUM CHLORIDE 0.9 % (FLUSH) 0.9 %
10 SYRINGE (ML) INJECTION PRN
Status: DISCONTINUED | OUTPATIENT
Start: 2020-06-23 | End: 2020-06-23 | Stop reason: SDUPTHER

## 2020-06-23 RX ORDER — ACETAMINOPHEN 325 MG/1
650 TABLET ORAL EVERY 4 HOURS PRN
Status: DISCONTINUED | OUTPATIENT
Start: 2020-06-23 | End: 2020-06-24 | Stop reason: HOSPADM

## 2020-06-23 RX ORDER — SODIUM CHLORIDE 9 MG/ML
INJECTION, SOLUTION INTRAVENOUS ONCE
Status: DISCONTINUED | OUTPATIENT
Start: 2020-06-23 | End: 2020-06-24 | Stop reason: HOSPADM

## 2020-06-23 RX ORDER — SODIUM CHLORIDE 0.9 % (FLUSH) 0.9 %
10 SYRINGE (ML) INJECTION EVERY 12 HOURS SCHEDULED
Status: DISCONTINUED | OUTPATIENT
Start: 2020-06-23 | End: 2020-06-24 | Stop reason: HOSPADM

## 2020-06-23 RX ADMIN — SODIUM CHLORIDE: 9 INJECTION, SOLUTION INTRAVENOUS at 11:00

## 2020-06-23 NOTE — PROCEDURES
[address]                  [id number] [facility]                            CARDIAC CATHETERIZATION    PATIENT NAME: Radha Wright                 :        1950  MED REC NO:   [id number]                            ROOM:       6324  ACCOUNT NO:   [de-identified]                           ADMIT DATE: 2020  PROVIDER:     Germán Up MD    DATE OF PROCEDURE:  2020    PROCEDURE:  Left heart catheterization and IFR of the RCA. INDICATION:  Chest discomfort, dyspnea on exertion and positive anterior  ischemia on Lexiscan. PROCEDURE NOTE:  The patient was brought to the cardiac cath lab in her  usual fasting state. Under sterile condition and under local anesthetic  and using conscious sedation, a 6-[place] Terumo Slender sheath was  inserted into the right radial artery. The patient received 3000 units  of intravenous heparin. She also received 200 mcg of intraarterial  nitroglycerin and 2.5 mg of diluted verapamil via the right radial  sheath. Then, I could not advance my J-tip wire or a Wholey wire into  the axillary-subclavian artery due to severe stenosis. Then, a  micropuncture needle was used and a 6-[place] sheath was inserted into  the right common femoral artery. Then a 5-[place] JL-4 diagnostic  catheter was advanced to the ascending aorta without difficulty. The  left main coronary artery was engaged and a coronary angiogram was done. This catheter was exchanged over a guidewire to a 5-[place] JR-4  diagnostic catheter, which was used to engage the right coronary artery  and a coronary angiogram was done. This catheter was exchanged over the  guidewire to a 5-[place] pigtail, which was advanced into the left  ventricle without difficulty. The left ventricular end-diastolic  pressure was measured. Left ventriculogram was done.   There was no  significant gradient across the aortic

## 2020-06-23 NOTE — DISCHARGE INSTR - COC
Treatments After Discharge: ***    Physician Certification: I certify the above information and transfer of Claudette Moron  is necessary for the continuing treatment of the diagnosis listed and that she requires {Admit to Appropriate Level of Care:17385} for {GREATER/LESS:969624115} 30 days.      Update Admission H&P: {CHP DME Changes in TPBSE:649826658}    PHYSICIAN SIGNATURE:  {Esignature:[id number]}

## 2020-07-10 ENCOUNTER — VIRTUAL VISIT (OUTPATIENT)
Dept: CARDIOLOGY CLINIC | Age: 70
End: 2020-07-10
Payer: MEDICARE

## 2020-07-10 PROCEDURE — 99441 PR PHYS/QHP TELEPHONE EVALUATION 5-10 MIN: CPT | Performed by: INTERNAL MEDICINE

## 2020-07-10 ASSESSMENT — ENCOUNTER SYMPTOMS
VOMITING: 0
NAUSEA: 0
ABDOMINAL PAIN: 0
CONSTIPATION: 0
BACK PAIN: 0
COUGH: 0
SHORTNESS OF BREATH: 0
WHEEZING: 0
BLOOD IN STOOL: 0
DIARRHEA: 0

## 2020-07-10 NOTE — PROGRESS NOTES
diagonal stenosis, 30 to 40% proximal OM and 1 stenosis, 50 to 60% tubular mid stenosis and 40 to 50% ostial proximal PDA stenosis with an ejection fraction of 55%. IFR of the RCA was 0.93, 0.92 and 0.92. Patient was discharged home on medical therapy. Patient feels 80 to 90% last dyspnea on exertion. She still complaining of chest discomfort at rest and is on exertion lasting couple of minutes, rated 5/10 intensity and radiating to her back. The discomfort has been occurring over the last few months. Patient feels occasional palpitation and dizziness but denies syncope. She stated that her lower extremity edema has improved. Review of Systems:   Review of Systems   Constitutional: Positive for fatigue. Negative for chills and fever. HENT: Negative for nosebleeds. Respiratory: Negative for cough, shortness of breath and wheezing. She denies snoring or gasping for air at night. Gastrointestinal: Negative for abdominal pain, blood in stool, constipation, diarrhea, nausea and vomiting. Genitourinary: Negative for dysuria and hematuria. Musculoskeletal: Negative for back pain, joint swelling and myalgias. Neurological: Negative for syncope and light-headedness. Psychiatric/Behavioral: The patient is not nervous/anxious. Due to this being a TeleHealth encounter, evaluation of the following organ systems is limited: Vitals/Constitutional/EENT/Resp/CV/GI//MS/Neuro/Skin/Heme-Lymph-Imm. Past Medical History:  Past Medical History:   Diagnosis Date    Arthritis     CAD (coronary artery disease)     Cerebrovascular disease     Chronic back pain     Congenital heart disease     CVA (cerebral vascular accident) ([facility] [address] 8/4118    Embolic. ? tia?  Depression     Fracture of right shoulder 1/2011    Frequent UTI     GERD (gastroesophageal reflux disease)     H/O ligation of vein 1974 Bilaterally.     Hiatal hernia 2000    History of thoracic outlet syndrome     Hypercholesterolemia     Hypertension     Hypothyroidism     Kidney stones     Nephrolithiasis     Bilaterally.  Nonischemic cardiomyopathy ([address] 6/25/2018    ROSALVA (obstructive sleep apnea) 04/16/2018    mild    Osteoarthritis     PFO (patent foramen ovale)     Pneumonia 1989    Thyroid disease     Hypothyroidism. Past Surgical History:  Past Surgical History:   Procedure Laterality Date    CARDIAC CATHETERIZATION  03/05/2018    No significant CAD  (Dr Tammy Sequeira)    COLONOSCOPY  11/04    Negative.  COLONOSCOPY  09/17/2013    COLONOSCOPY  12/30/2016    Sigmoid Colitis, Diverticulitis    DIAGNOSTIC CARDIAC CATH LAB PROCEDURE  5/17/1988    [facility], patent coronaries.  DIAGNOSTIC CARDIAC CATH LAB PROCEDURE  6/10/1992    [facility], patent coronaries.  DIAGNOSTIC CARDIAC CATH LAB PROCEDURE  10/26/2001    [facility], Normal cath, right femoral angiography and Perclose.  DIAGNOSTIC CARDIAC CATH LAB PROCEDURE  12/7/2005    [facility].    HYSTERECTOMY  1983    LITHOTRIPSY      MIDDLE EAR SURGERY  1987    Eardrum replaced in right ear.  TONSILLECTOMY      URETHRA SURGERY      VASCULAR SURGERY      veil ligation kylee legs       Family History:  Family History   Problem Relation Age of Onset    Coronary Art Dis Mother         s/p CABG    Alzheimer's Disease Mother     Heart Disease Brother 72        ACB       Social History:  Social History     Socioeconomic History    Marital status:       Spouse name: Not on file    Number of children: Not on file    Years of education: Not on file    Highest education level: Not on file   Occupational History    Not on file   Social Needs    Financial resource strain: Not on file    Food insecurity     Worry: Not on file     Inability: Not on file    Transportation needs     Medical: Not on file     Non-medical: Not on file   Tobacco Use    Smoking status: Former Smoker     Packs/day: 3.00     Years: 24.00     Pack years: 72.00     Types: Cigarettes Start date: 66     Last attempt to quit: 1999     Years since quittin.5    Smokeless tobacco: Never Used    Tobacco comment: quit 17 yerars ago   Substance and Sexual Activity    Alcohol use: No     Alcohol/week: 0.0 standard drinks     Comment: denies caffeine    Drug use: No    Sexual activity: Not on file   Lifestyle    Physical activity     Days per week: Not on file     Minutes per session: Not on file    Stress: Not on file   Relationships    Social connections     Talks on phone: Not on file     Gets together: Not on file     Attends Congregational service: Not on file     Active member of club or organization: Not on file     Attends meetings of clubs or organizations: Not on file     Relationship status: Not on file    Intimate partner violence     Fear of current or ex partner: Not on file     Emotionally abused: Not on file     Physically abused: Not on file     Forced sexual activity: Not on file   Other Topics Concern    Not on file   Social History Narrative    Not on file       Allergies: Allergies   Allergen Reactions    Biaxin [Clarithromycin] Nausea And Vomiting and Other (See Comments)     Abdominal pain    Codeine Nausea And Vomiting    Neosporin [Bacitracin-Neomycin-Polymyxin] Swelling    Sulfa Antibiotics Itching and Nausea And Vomiting     decrease in Blood pressure.  Clarithromycin Other (See Comments)    Neomycin-Polymyxin-Gramicidin Swelling     redness    Septra [Sulfamethoxazole-Trimethoprim]     Vioxx [Rofecoxib]     Sulfasalazine Nausea And Vomiting     severe headaches.        Current Medications:  Current Outpatient Medications   Medication Sig Dispense Refill    sertraline (ZOLOFT) 50 MG tablet Take 1 tablet by mouth daily 90 tablet 0    isosorbide mononitrate (IMDUR) 30 MG extended release tablet Take 1 tablet by mouth daily 30 tablet 5    sucralfate (CARAFATE) 1 GM tablet Take 1 tablet by mouth 2 times daily Indications: pt had on 20 60 tablet 1  omeprazole (PRILOSEC) 20 MG delayed release capsule Take 1 capsule by mouth 2 times daily 180 capsule 1    carvedilol (COREG) 3.125 MG tablet Take 1 tablet by mouth 2 times daily (with meals) 180 tablet 0    mesalamine (LIALDA) 1.2 g EC tablet TAKE 1 TABLET BY MOUTH DAILY      levothyroxine (SYNTHROID) 100 MCG tablet Take 1 tablet by mouth Daily 90 tablet 0    fluticasone (FLONASE) 50 MCG/ACT nasal spray 2 sprays by Nasal route every morning 1 Bottle 5    nitroGLYCERIN (NITROSTAT) 0.4 MG SL tablet Place 1 tablet under the tongue every 5 minutes as needed for Chest pain 25 tablet 3    simvastatin (ZOCOR) 20 MG tablet Take 1 tablet by mouth nightly 90 tablet 1    aspirin 81 MG EC tablet Take 1 tablet by mouth daily 30 tablet 3    ascorbic acid (VITAMIN C) 500 MG tablet Take 500 mg by mouth daily.  multivitamin-iron-minerals-folic acid (CENTRUM) chewable tablet Take 1 tablet by mouth daily. No current facility-administered medications for this visit. Physical Exam:  There were no vitals taken for this visit. Wt Readings from Last 3 Encounters:   06/23/20 200 lb (90.7 kg)   06/03/20 198 lb (89.8 kg)   05/19/20 198 lb (89.8 kg)       Physical Exam    Unable to do. Patient is in [place].     Laboratory Tests:  Lab Results   Component Value Date    CREATININE 0.6 06/18/2020    BUN 15 06/18/2020     06/18/2020    K 4.1 06/18/2020     06/18/2020    CO2 25 06/18/2020     No results found for: MG  Lab Results   Component Value Date    WBC 6.5 06/18/2020    HGB 10.5 (L) 06/18/2020    HCT 33.6 (L) 06/18/2020    MCV 84.2 06/18/2020     06/18/2020     Lab Results   Component Value Date    ALT 13 06/18/2020    AST 16 06/18/2020    ALKPHOS 85 06/18/2020    BILITOT 0.4 06/18/2020     Lab Results   Component Value Date    CKTOTAL 38 04/23/2011    TROPONINI <0.01 10/11/2014     Lab Results   Component Value Date    INR 1.0 06/18/2020    INR 1.0 10/11/2014    INR 1.1 04/22/2011 PROTIME 11.1 06/18/2020    PROTIME 10.5 10/11/2014    PROTIME 11.9 04/22/2011     Lab Results   Component Value Date    TSH 0.982 11/11/2019       Lab Results   Component Value Date    CHOL 176 11/11/2019    CHOL 201 (H) 03/22/2019    CHOL 186 07/17/2018     Lab Results   Component Value Date    TRIG 138 11/11/2019    TRIG 171 (H) 03/22/2019    TRIG 179 (H) 07/17/2018     Lab Results   Component Value Date    HDL 51 11/11/2019    HDL 50 03/22/2019    HDL 44 07/17/2018     Lab Results   Component Value Date    LDLCALC 97 11/11/2019    LDLCALC 117 (H) 03/22/2019    LDLCALC 106 (H) 07/17/2018     Lab Results   Component Value Date    LABVLDL 28 11/11/2019    LABVLDL 34 03/22/2019    LABVLDL 36 07/17/2018           ASSESSMENT / PLAN:  -CAD: Patient has moderate mid RCA stenosis which is physiologically not significant. Her chest discomfort could be due to small vessel disease or due to reflux and hiatal hernia. .  Patient has not been taking her Imdur.  -Dyspnea on exertion: Probably multifactorial in origin including diastolic dysfunction due to hypertension, pulmonary hypertension due to obstructive sleep apnea, anemia, obesity and deconditioning.  -Hyperlipidemia: On simvastatin. -Hypertension.  -Thoracic outlet syndrome.  -Obstructive sleep apnea. -History of PFO.  -History of CVA. -Hiatal hernia. -Hypothyroidism.  -Obesity.  -Depression. We will continue her current cardiac medication including aspirin, carvedilol and simvastatin. Patient was asked to take her Imdur daily. Consider increasing Imdur and or adding Ranexa if patient continues to complain of chest discomfort. Will follow-up at the office in 6 months. The patient's current medication list, allergies, problem list and results of all previously ordered testing were reviewed at today's visit. {  Silvio Nazario MD , [facility].   [facility]) Cardiology

## 2020-07-14 NOTE — INTERVAL H&P NOTE
Update History & Physical    The patient's History and Physical of June 16, 2020  was reviewed with the patient and I examined the patient. There was no change. The surgical site was confirmed by the patient and me. Plan: The risks, benefits, expected outcome, and alternative to the recommended procedure have been discussed with the patient. Patient understands and wants to proceed with the procedure.      Electronically signed by Khadar Rubio MD on 7/14/2020 at 10:01 AM

## 2020-07-22 ENCOUNTER — HOSPITAL ENCOUNTER (OUTPATIENT)
Age: 70
Discharge: HOME | End: 2020-07-24
Payer: MEDICARE

## 2020-07-22 LAB
ALBUMIN SERPL-MCNC: 4.5 G/DL (ref 3.5–5.2)
ALP BLD-CCNC: 82 U/L (ref 35–104)
ALT SERPL-CCNC: 11 U/L (ref 0–32)
ANION GAP SERPL CALCULATED.3IONS-SCNC: 13 MMOL/L (ref 7–16)
AST SERPL-CCNC: 14 U/L (ref 0–31)
BASOPHILS ABSOLUTE: 0.03 E9/L (ref 0–0.2)
BASOPHILS RELATIVE PERCENT: 0.5 % (ref 0–2)
BILIRUB SERPL-MCNC: 0.4 MG/DL (ref 0–1.2)
BUN BLDV-MCNC: 13 MG/DL (ref 8–23)
CALCIUM SERPL-MCNC: 9.6 MG/DL (ref 8.6–10.2)
CHLORIDE BLD-SCNC: 102 MMOL/L (ref 98–107)
CHOLESTEROL, TOTAL: 143 MG/DL (ref 0–199)
CO2: 28 MMOL/L (ref 22–29)
CREAT SERPL-MCNC: 0.6 MG/DL (ref 0.5–1)
EOSINOPHILS ABSOLUTE: 0.13 E9/L (ref 0.05–0.5)
EOSINOPHILS RELATIVE PERCENT: 2.1 % (ref 0–6)
GFR AFRICAN AMERICAN: >60
GFR NON-AFRICAN AMERICAN: >60 ML/MIN/1.73
GLUCOSE BLD-MCNC: 79 MG/DL (ref 74–99)
HCT VFR BLD CALC: 33.5 % (ref 34–48)
HDLC SERPL-MCNC: 43 MG/DL
HEMOGLOBIN: 10.2 G/DL (ref 11.5–15.5)
IMMATURE GRANULOCYTES #: 0.02 E9/L
IMMATURE GRANULOCYTES %: 0.3 % (ref 0–5)
LDL CHOLESTEROL CALCULATED: 73 MG/DL (ref 0–99)
LYMPHOCYTES ABSOLUTE: 0.96 E9/L (ref 1.5–4)
LYMPHOCYTES RELATIVE PERCENT: 15.5 % (ref 20–42)
MCH RBC QN AUTO: 26.2 PG (ref 26–35)
MCHC RBC AUTO-ENTMCNC: 30.4 % (ref 32–34.5)
MCV RBC AUTO: 85.9 FL (ref 80–99.9)
MONOCYTES ABSOLUTE: 0.49 E9/L (ref 0.1–0.95)
MONOCYTES RELATIVE PERCENT: 7.9 % (ref 2–12)
NEUTROPHILS ABSOLUTE: 4.56 E9/L (ref 1.8–7.3)
NEUTROPHILS RELATIVE PERCENT: 73.7 % (ref 43–80)
PDW BLD-RTO: 14.2 FL (ref 11.5–15)
PLATELET # BLD: 232 E9/L (ref 130–450)
PMV BLD AUTO: 11.8 FL (ref 7–12)
POTASSIUM SERPL-SCNC: 4.4 MMOL/L (ref 3.5–5)
RBC # BLD: 3.9 E12/L (ref 3.5–5.5)
SODIUM BLD-SCNC: 143 MMOL/L (ref 132–146)
TOTAL PROTEIN: 7.9 G/DL (ref 6.4–8.3)
TRIGL SERPL-MCNC: 135 MG/DL (ref 0–149)
TSH SERPL DL<=0.05 MIU/L-ACNC: 0.42 UIU/ML (ref 0.27–4.2)
VLDLC SERPL CALC-MCNC: 27 MG/DL
WBC # BLD: 6.2 E9/L (ref 4.5–11.5)

## 2020-07-22 PROCEDURE — 84443 ASSAY THYROID STIM HORMONE: CPT

## 2020-07-22 PROCEDURE — 85025 COMPLETE CBC W/AUTO DIFF WBC: CPT

## 2020-07-22 PROCEDURE — 80061 LIPID PANEL: CPT

## 2020-07-22 PROCEDURE — 80053 COMPREHEN METABOLIC PANEL: CPT

## 2020-08-19 ENCOUNTER — APPOINTMENT (OUTPATIENT)
Dept: GENERAL RADIOLOGY | Age: 70
DRG: 202 | End: 2020-08-19
Payer: MEDICARE

## 2020-08-19 ENCOUNTER — HOSPITAL ENCOUNTER (INPATIENT)
Age: 70
LOS: 3 days | Discharge: HOME | DRG: 202 | End: 2020-08-24
Attending: EMERGENCY MEDICINE | Admitting: FAMILY MEDICINE
Payer: MEDICARE

## 2020-08-19 ENCOUNTER — APPOINTMENT (OUTPATIENT)
Dept: CT IMAGING | Age: 70
DRG: 202 | End: 2020-08-19
Payer: MEDICARE

## 2020-08-19 PROBLEM — R07.9 CHEST PAIN: Status: ACTIVE | Noted: 2020-08-19

## 2020-08-19 LAB
ALBUMIN SERPL-MCNC: 4.2 G/DL (ref 3.5–5.2)
ALP BLD-CCNC: 90 U/L (ref 35–104)
ALT SERPL-CCNC: 18 U/L (ref 0–32)
ANION GAP SERPL CALCULATED.3IONS-SCNC: 14 MMOL/L (ref 7–16)
AST SERPL-CCNC: 33 U/L (ref 0–31)
BASOPHILS ABSOLUTE: 0.03 E9/L (ref 0–0.2)
BASOPHILS RELATIVE PERCENT: 0.4 % (ref 0–2)
BILIRUB SERPL-MCNC: 0.4 MG/DL (ref 0–1.2)
BUN BLDV-MCNC: 12 MG/DL (ref 8–23)
CALCIUM SERPL-MCNC: 9.3 MG/DL (ref 8.6–10.2)
CHLORIDE BLD-SCNC: 98 MMOL/L (ref 98–107)
CO2: 25 MMOL/L (ref 22–29)
CREAT SERPL-MCNC: 0.5 MG/DL (ref 0.5–1)
D DIMER: 300 NG/ML DDU
EOSINOPHILS ABSOLUTE: 0.16 E9/L (ref 0.05–0.5)
EOSINOPHILS RELATIVE PERCENT: 2.1 % (ref 0–6)
GFR AFRICAN AMERICAN: >60
GFR NON-AFRICAN AMERICAN: >60 ML/MIN/1.73
GLUCOSE BLD-MCNC: 106 MG/DL (ref 74–99)
HCT VFR BLD CALC: 31.9 % (ref 34–48)
HEMOGLOBIN: 9.9 G/DL (ref 11.5–15.5)
IMMATURE GRANULOCYTES #: 0.03 E9/L
IMMATURE GRANULOCYTES %: 0.4 % (ref 0–5)
LACTIC ACID: 1.1 MMOL/L (ref 0.5–2.2)
LYMPHOCYTES ABSOLUTE: 1.02 E9/L (ref 1.5–4)
LYMPHOCYTES RELATIVE PERCENT: 13.2 % (ref 20–42)
MCH RBC QN AUTO: 26.1 PG (ref 26–35)
MCHC RBC AUTO-ENTMCNC: 31 % (ref 32–34.5)
MCV RBC AUTO: 83.9 FL (ref 80–99.9)
MONOCYTES ABSOLUTE: 0.58 E9/L (ref 0.1–0.95)
MONOCYTES RELATIVE PERCENT: 7.5 % (ref 2–12)
NEUTROPHILS ABSOLUTE: 5.9 E9/L (ref 1.8–7.3)
NEUTROPHILS RELATIVE PERCENT: 76.4 % (ref 43–80)
PDW BLD-RTO: 14 FL (ref 11.5–15)
PLATELET # BLD: 203 E9/L (ref 130–450)
PMV BLD AUTO: 11.9 FL (ref 7–12)
POTASSIUM REFLEX MAGNESIUM: 3.7 MMOL/L (ref 3.5–5)
PRO-BNP: 1424 PG/ML (ref 0–125)
RBC # BLD: 3.8 E12/L (ref 3.5–5.5)
SODIUM BLD-SCNC: 137 MMOL/L (ref 132–146)
TOTAL PROTEIN: 7.4 G/DL (ref 6.4–8.3)
TROPONIN: <0.01 NG/ML (ref 0–0.03)
WBC # BLD: 7.7 E9/L (ref 4.5–11.5)

## 2020-08-19 PROCEDURE — G0378 HOSPITAL OBSERVATION PER HR: HCPCS

## 2020-08-19 PROCEDURE — 71045 X-RAY EXAM CHEST 1 VIEW: CPT

## 2020-08-19 PROCEDURE — 85378 FIBRIN DEGRADE SEMIQUANT: CPT

## 2020-08-19 PROCEDURE — 80053 COMPREHEN METABOLIC PANEL: CPT

## 2020-08-19 PROCEDURE — 93005 ELECTROCARDIOGRAM TRACING: CPT | Performed by: STUDENT IN AN ORGANIZED HEALTH CARE EDUCATION/TRAINING PROGRAM

## 2020-08-19 PROCEDURE — 36415 COLL VENOUS BLD VENIPUNCTURE: CPT

## 2020-08-19 PROCEDURE — 6360000004 HC RX CONTRAST MEDICATION: Performed by: RADIOLOGY

## 2020-08-19 PROCEDURE — 83605 ASSAY OF LACTIC ACID: CPT

## 2020-08-19 PROCEDURE — 85025 COMPLETE CBC W/AUTO DIFF WBC: CPT

## 2020-08-19 PROCEDURE — 71275 CT ANGIOGRAPHY CHEST: CPT

## 2020-08-19 PROCEDURE — 2580000003 HC RX 258: Performed by: RADIOLOGY

## 2020-08-19 PROCEDURE — 99285 EMERGENCY DEPT VISIT HI MDM: CPT

## 2020-08-19 PROCEDURE — 83880 ASSAY OF NATRIURETIC PEPTIDE: CPT

## 2020-08-19 PROCEDURE — 84484 ASSAY OF TROPONIN QUANT: CPT

## 2020-08-19 PROCEDURE — 6370000000 HC RX 637 (ALT 250 FOR IP): Performed by: STUDENT IN AN ORGANIZED HEALTH CARE EDUCATION/TRAINING PROGRAM

## 2020-08-19 RX ORDER — NITROGLYCERIN 0.4 MG/1
0.4 TABLET SUBLINGUAL ONCE
Status: COMPLETED | OUTPATIENT
Start: 2020-08-19 | End: 2020-08-19

## 2020-08-19 RX ORDER — SODIUM CHLORIDE 0.9 % (FLUSH) 0.9 %
10 SYRINGE (ML) INJECTION PRN
Status: DISCONTINUED | OUTPATIENT
Start: 2020-08-19 | End: 2020-08-24 | Stop reason: HOSPADM

## 2020-08-19 RX ADMIN — Medication 10 ML: at 20:16

## 2020-08-19 RX ADMIN — NITROGLYCERIN 0.4 MG: 0.4 TABLET, ORALLY DISINTEGRATING SUBLINGUAL at 17:51

## 2020-08-19 RX ADMIN — IOPAMIDOL 75 ML: 755 INJECTION, SOLUTION INTRAVENOUS at 20:16

## 2020-08-19 ASSESSMENT — PAIN SCALES - GENERAL
PAINLEVEL_OUTOF10: 0
PAINLEVEL_OUTOF10: 2

## 2020-08-19 ASSESSMENT — PAIN DESCRIPTION - ORIENTATION: ORIENTATION: LEFT

## 2020-08-19 ASSESSMENT — PAIN DESCRIPTION - DESCRIPTORS: DESCRIPTORS: PRESSURE

## 2020-08-19 ASSESSMENT — PAIN DESCRIPTION - LOCATION: LOCATION: CHEST

## 2020-08-19 ASSESSMENT — PAIN DESCRIPTION - PAIN TYPE: TYPE: ACUTE PAIN

## 2020-08-19 NOTE — LETTER
Beneficiary Notification Letter  BPCI Advanced     Your Doctor or [address],    We wanted to let you know that your health care provider, [address], has volunteered to take part in our [facility] for [facility] & Medicaid Services (CMS) Bundled Payments for [facility] (BPCI Advanced). This doesnt change your Medicare rights or benefits and you dont need to do anything. What are bundled payments? A bundled payment combines, or bundles together, payments that Medicare makes to your health care providers for the many different kinds of medical services you might get in a specific time period. In BPCI Advanced, this time period could include a hospital inpatient stay or outpatient procedure, plus 90 days. Why would Medicare bundle payments? Bundled payments are thought of as a value-based way to pay because health care providers are responsible for both the quality and cost of medical care they give. This is a relatively new way of paying health care providers compared to thefee-for-service way Medicare has traditionally paid, where providers are paid separately for each service they provide. Bundled payments encourage these providers to work together to provide better, more coordinated care during your hospital stay, or outpatient procedure, and through your recovery. What does BPCI Advance mean for me? Youre more likely to get even better care when hospitals, doctors, and other health care providers work together. In BPCI Advanced, hospitals, doctors, and other health care providers may be rewarded for providing better, more coordinated health care. Medicare will watch BPCI Advanced participants closely to make sure that you and other patients keep getting efficient, high quality care. What do I need to know about BPCI Advanced?   Whats most important for you to know is that your Medicare rights and benefits wont change because your health care provider is participating in [facility]. Medicare will keep covering all of your medically necessary services. Even though Medicare will pay your doctor in a different way under BPCI Advanced, how much you have to pay wont change. Health care providers and suppliers who are enrolled in Medicare will submit their Medicare claims like they always have. Youll have all the same Medicare rights and protections, including the right to choose which hospital, doctor, or other health care provider you see. If you dont want to get care from a health care provider whos participating in [facility], then youll have to choose a different health care provider whos not participating in the Model. How can I give feedback about my health care? Medicare might ask you to take a voluntary survey about the services and care you received from [address] [place] [address] during your hospital stay or outpatient procedure and for a specific period of time afterwards. You can decide whether you want to take the voluntary survey, but if you do, itll help Medicare make BPCI Advanced and the care of other Medicare patients better. If you have concerns or complaints about your care, you can:   · Talk to your doctor or health care provider. · Contact your Beneficiary and Family Centered Care Quality Improvement   Organization [facility]). You can get your [employer]-QIOs phone number  at  Medicare.gov/contacts or by calling 1-800-MEDICARE. TTY users can call  8-987.966.7353. Where can I learn more about BPCI Advanced? Learn more about BPCI Advanced at https://innovation.cms.gov/initiatives/bpci-advanced/:  · A list of all the hospitals and physician group practices in the country participating in [address] [place] [address]. · All of the inpatient and outpatient Clinical Episodes that are currently included under BPCI Advanced.  A Clinical Episode is a grouping of medical conditions or diagnoses that are included in the [address].

## 2020-08-20 ENCOUNTER — ANESTHESIA EVENT (OUTPATIENT)
Dept: ENDOSCOPY | Age: 70
DRG: 202 | End: 2020-08-20
Payer: MEDICARE

## 2020-08-20 LAB
ALBUMIN SERPL-MCNC: 3.9 G/DL (ref 3.5–5.2)
ALP BLD-CCNC: 86 U/L (ref 35–104)
ALT SERPL-CCNC: 17 U/L (ref 0–32)
ANION GAP SERPL CALCULATED.3IONS-SCNC: 13 MMOL/L (ref 7–16)
AST SERPL-CCNC: 22 U/L (ref 0–31)
BASOPHILS ABSOLUTE: 0.02 E9/L (ref 0–0.2)
BASOPHILS RELATIVE PERCENT: 0.3 % (ref 0–2)
BILIRUB SERPL-MCNC: 0.4 MG/DL (ref 0–1.2)
BUN BLDV-MCNC: 9 MG/DL (ref 8–23)
CALCIUM SERPL-MCNC: 9.2 MG/DL (ref 8.6–10.2)
CHLORIDE BLD-SCNC: 100 MMOL/L (ref 98–107)
CO2: 27 MMOL/L (ref 22–29)
CREAT SERPL-MCNC: 0.6 MG/DL (ref 0.5–1)
EKG ATRIAL RATE: 82 BPM
EKG P AXIS: 16 DEGREES
EKG P-R INTERVAL: 164 MS
EKG Q-T INTERVAL: 360 MS
EKG QRS DURATION: 94 MS
EKG QTC CALCULATION (BAZETT): 420 MS
EKG R AXIS: 27 DEGREES
EKG T AXIS: 76 DEGREES
EKG VENTRICULAR RATE: 82 BPM
EOSINOPHILS ABSOLUTE: 0.12 E9/L (ref 0.05–0.5)
EOSINOPHILS RELATIVE PERCENT: 2 % (ref 0–6)
GFR AFRICAN AMERICAN: >60
GFR NON-AFRICAN AMERICAN: >60 ML/MIN/1.73
GLUCOSE BLD-MCNC: 102 MG/DL (ref 74–99)
HCT VFR BLD CALC: 32.5 % (ref 34–48)
HEMOGLOBIN: 10 G/DL (ref 11.5–15.5)
IMMATURE GRANULOCYTES #: 0.03 E9/L
IMMATURE GRANULOCYTES %: 0.5 % (ref 0–5)
LYMPHOCYTES ABSOLUTE: 0.67 E9/L (ref 1.5–4)
LYMPHOCYTES RELATIVE PERCENT: 11.4 % (ref 20–42)
MCH RBC QN AUTO: 25.7 PG (ref 26–35)
MCHC RBC AUTO-ENTMCNC: 30.8 % (ref 32–34.5)
MCV RBC AUTO: 83.5 FL (ref 80–99.9)
METER GLUCOSE: 93 MG/DL (ref 74–99)
MONOCYTES ABSOLUTE: 0.45 E9/L (ref 0.1–0.95)
MONOCYTES RELATIVE PERCENT: 7.6 % (ref 2–12)
NEUTROPHILS ABSOLUTE: 4.61 E9/L (ref 1.8–7.3)
NEUTROPHILS RELATIVE PERCENT: 78.2 % (ref 43–80)
PDW BLD-RTO: 14 FL (ref 11.5–15)
PLATELET # BLD: 203 E9/L (ref 130–450)
PMV BLD AUTO: 11.7 FL (ref 7–12)
POTASSIUM SERPL-SCNC: 3.9 MMOL/L (ref 3.5–5)
RBC # BLD: 3.89 E12/L (ref 3.5–5.5)
SODIUM BLD-SCNC: 140 MMOL/L (ref 132–146)
TOTAL PROTEIN: 7.4 G/DL (ref 6.4–8.3)
TROPONIN: <0.01 NG/ML (ref 0–0.03)
TROPONIN: <0.01 NG/ML (ref 0–0.03)
WBC # BLD: 5.9 E9/L (ref 4.5–11.5)

## 2020-08-20 PROCEDURE — 99222 1ST HOSP IP/OBS MODERATE 55: CPT | Performed by: INTERNAL MEDICINE

## 2020-08-20 PROCEDURE — 6370000000 HC RX 637 (ALT 250 FOR IP): Performed by: FAMILY MEDICINE

## 2020-08-20 PROCEDURE — 93010 ELECTROCARDIOGRAM REPORT: CPT | Performed by: INTERNAL MEDICINE

## 2020-08-20 PROCEDURE — G0378 HOSPITAL OBSERVATION PER HR: HCPCS

## 2020-08-20 PROCEDURE — APPSS60 APP SPLIT SHARED TIME 46-60 MINUTES: Performed by: CLINICAL NURSE SPECIALIST

## 2020-08-20 PROCEDURE — 36415 COLL VENOUS BLD VENIPUNCTURE: CPT

## 2020-08-20 PROCEDURE — 84484 ASSAY OF TROPONIN QUANT: CPT

## 2020-08-20 PROCEDURE — 80053 COMPREHEN METABOLIC PANEL: CPT

## 2020-08-20 PROCEDURE — 96372 THER/PROPH/DIAG INJ SC/IM: CPT

## 2020-08-20 PROCEDURE — 85025 COMPLETE CBC W/AUTO DIFF WBC: CPT

## 2020-08-20 PROCEDURE — 6360000002 HC RX W HCPCS: Performed by: FAMILY MEDICINE

## 2020-08-20 PROCEDURE — 2580000003 HC RX 258: Performed by: RADIOLOGY

## 2020-08-20 PROCEDURE — 82962 GLUCOSE BLOOD TEST: CPT

## 2020-08-20 RX ORDER — PANTOPRAZOLE SODIUM 40 MG/1
40 TABLET, DELAYED RELEASE ORAL
Status: DISCONTINUED | OUTPATIENT
Start: 2020-08-20 | End: 2020-08-24 | Stop reason: HOSPADM

## 2020-08-20 RX ORDER — LEVOTHYROXINE SODIUM 0.1 MG/1
100 TABLET ORAL DAILY
Status: DISCONTINUED | OUTPATIENT
Start: 2020-08-20 | End: 2020-08-24 | Stop reason: HOSPADM

## 2020-08-20 RX ORDER — ASPIRIN 81 MG/1
81 TABLET, CHEWABLE ORAL DAILY
Status: DISCONTINUED | OUTPATIENT
Start: 2020-08-20 | End: 2020-08-24 | Stop reason: HOSPADM

## 2020-08-20 RX ORDER — CARVEDILOL 3.12 MG/1
3.12 TABLET ORAL 2 TIMES DAILY WITH MEALS
Status: DISCONTINUED | OUTPATIENT
Start: 2020-08-20 | End: 2020-08-24 | Stop reason: HOSPADM

## 2020-08-20 RX ORDER — ISOSORBIDE MONONITRATE 30 MG/1
30 TABLET, EXTENDED RELEASE ORAL DAILY
Status: DISCONTINUED | OUTPATIENT
Start: 2020-08-20 | End: 2020-08-24 | Stop reason: HOSPADM

## 2020-08-20 RX ORDER — MESALAMINE 1.2 G/1
1.2 TABLET, DELAYED RELEASE ORAL
Status: DISCONTINUED | OUTPATIENT
Start: 2020-08-20 | End: 2020-08-24 | Stop reason: HOSPADM

## 2020-08-20 RX ORDER — ATORVASTATIN CALCIUM 20 MG/1
20 TABLET, FILM COATED ORAL NIGHTLY
Status: DISCONTINUED | OUTPATIENT
Start: 2020-08-20 | End: 2020-08-24 | Stop reason: HOSPADM

## 2020-08-20 RX ADMIN — Medication 10 ML: at 08:08

## 2020-08-20 RX ADMIN — Medication 10 ML: at 19:58

## 2020-08-20 RX ADMIN — ISOSORBIDE MONONITRATE 30 MG: 30 TABLET ORAL at 08:08

## 2020-08-20 RX ADMIN — SERTRALINE 50 MG: 50 TABLET, FILM COATED ORAL at 08:08

## 2020-08-20 RX ADMIN — ATORVASTATIN CALCIUM 20 MG: 20 TABLET, FILM COATED ORAL at 19:57

## 2020-08-20 RX ADMIN — LEVOTHYROXINE SODIUM 100 MCG: 0.1 TABLET ORAL at 08:08

## 2020-08-20 RX ADMIN — ENOXAPARIN SODIUM 40 MG: 40 INJECTION SUBCUTANEOUS at 08:07

## 2020-08-20 RX ADMIN — CARVEDILOL 3.12 MG: 3.12 TABLET, FILM COATED ORAL at 17:07

## 2020-08-20 RX ADMIN — PANTOPRAZOLE SODIUM 40 MG: 40 TABLET, DELAYED RELEASE ORAL at 06:56

## 2020-08-20 RX ADMIN — ASPIRIN 81 MG: 81 TABLET, CHEWABLE ORAL at 08:07

## 2020-08-20 ASSESSMENT — ENCOUNTER SYMPTOMS
COUGH: 0
FACIAL SWELLING: 0
VOMITING: 0
VOICE CHANGE: 0
TROUBLE SWALLOWING: 0
RHINORRHEA: 0
ABDOMINAL PAIN: 0
NAUSEA: 0
BACK PAIN: 0
SHORTNESS OF BREATH: 1
CONSTIPATION: 0
SORE THROAT: 0
CHOKING: 0
DIARRHEA: 0

## 2020-08-20 ASSESSMENT — PAIN SCALES - GENERAL
PAINLEVEL_OUTOF10: 0
PAINLEVEL_OUTOF10: 0

## 2020-08-20 NOTE — ED PROVIDER NOTES
Negative for dysuria, frequency, hematuria and urgency. Musculoskeletal: Negative for arthralgias, back pain, myalgias and neck pain. Skin: Negative for rash. Neurological: Negative for dizziness, syncope, weakness, light-headedness, numbness and headaches. Psychiatric/Behavioral: Negative for behavioral problems and confusion. The patient is not nervous/anxious. Physical Exam  Vitals signs and nursing note reviewed. Constitutional:       General: She is awake. She is not in acute distress. Appearance: She is overweight. She is not ill-appearing or toxic-appearing. HENT:      Head: Normocephalic and atraumatic. Nose: Nose normal.      Mouth/Throat:      Mouth: Mucous membranes are moist.      Pharynx: Oropharynx is clear. Eyes:      Extraocular Movements: Extraocular movements intact. Pupils: Pupils are equal, round, and reactive to light. Neck:      Musculoskeletal: Normal range of motion and neck supple. Cardiovascular:      Rate and Rhythm: Normal rate and regular rhythm. Pulses: Normal pulses. Heart sounds: Normal heart sounds. Pulmonary:      Effort: Pulmonary effort is normal.      Breath sounds: Normal breath sounds. Abdominal:      General: Abdomen is flat. Bowel sounds are normal. There is no distension. Palpations: Abdomen is soft. Tenderness: There is no abdominal tenderness. Musculoskeletal: Normal range of motion. Skin:     General: Skin is warm and dry. Capillary Refill: Capillary refill takes less than 2 seconds. Neurological:      Mental Status: She is alert and oriented to person, place, and time. Psychiatric:         Behavior: Behavior is cooperative.         MDM  Number of Diagnoses or Management Options  Chest pain, unspecified type:   Shortness of breath:   Tracheomalacia:   Diagnosis management comments: Patient is a 72-year-old female presents to the emergency department with chest pain, shortness of breath through is a 2/10. Was given nitroglycerin.    [QC]   2109 Patient was reevaluated and pain is 0/10. This resolved with a second nitroglycerin, devious from EMS. CTA was discussed with patient and she is agreeable to admission.    [QC]      ED Course User Index  [QC] Jairo Puga MD       --------------------------------------------- PAST HISTORY ---------------------------------------------  Past Medical History:  has a past medical history of Arthritis, CAD (coronary artery disease), Cerebrovascular disease, Chronic back pain, Congenital heart disease, CVA (cerebral vascular accident) ([address] Depression, Fracture of right shoulder, Frequent UTI, GERD (gastroesophageal reflux disease), H/O ligation of vein, Hiatal hernia, History of thoracic outlet syndrome, Hypercholesterolemia, Hypertension, Hypothyroidism, Kidney stones, Nephrolithiasis, Nonischemic cardiomyopathy ([address] ROSALVA (obstructive sleep apnea), Osteoarthritis, PFO (patent foramen ovale), Pneumonia, and Thyroid disease. Past Surgical History:  has a past surgical history that includes Diagnostic Cardiac Cath Lab Procedure (5/17/1988); Diagnostic Cardiac Cath Lab Procedure (6/10/1992); Diagnostic Cardiac Cath Lab Procedure (10/26/2001); Diagnostic Cardiac Cath Lab Procedure (12/7/2005); Tonsillectomy; Middle ear surgery (1987); Hysterectomy (1983); vascular surgery; Lithotripsy; Urethra surgery; Colonoscopy (11/04); Colonoscopy (09/17/2013); Colonoscopy (12/30/2016); and Cardiac catheterization (03/05/2018). Social History:  reports that she quit smoking about 21 years ago. Her smoking use included cigarettes. She started smoking about 45 years ago. She has a 72.00 pack-year smoking history. She has never used smokeless tobacco. She reports that she does not drink alcohol or use drugs. Family History: family history includes Alzheimer's Disease in her mother; Coronary Art Dis in her mother; Heart Disease (age of onset: 72) in her brother.      The patients home medications have been reviewed. Allergies: Biaxin [clarithromycin]; Codeine; Neosporin [bacitracin-neomycin-polymyxin]; Sulfa antibiotics; Clarithromycin; Neomycin-polymyxin-gramicidin; Septra [sulfamethoxazole-trimethoprim];  Vioxx [rofecoxib]; and Sulfasalazine    -------------------------------------------------- RESULTS -------------------------------------------------    LABS:  Results for orders placed or performed during the hospital encounter of 08/19/20   CBC Auto Differential   Result Value Ref Range    WBC 7.7 4.5 - 11.5 E9/L    RBC 3.80 3.50 - 5.50 E12/L    Hemoglobin 9.9 (L) 11.5 - 15.5 g/dL    Hematocrit 31.9 (L) 34.0 - 48.0 %    MCV 83.9 80.0 - 99.9 fL    MCH 26.1 26.0 - 35.0 pg    MCHC 31.0 (L) 32.0 - 34.5 %    RDW 14.0 11.5 - 15.0 fL    Platelets 116 919 - 010 E9/L    MPV 11.9 7.0 - 12.0 fL    Neutrophils % 76.4 43.0 - 80.0 %    Immature Granulocytes % 0.4 0.0 - 5.0 %    Lymphocytes % 13.2 (L) 20.0 - 42.0 %    Monocytes % 7.5 2.0 - 12.0 %    Eosinophils % 2.1 0.0 - 6.0 %    Basophils % 0.4 0.0 - 2.0 %    Neutrophils Absolute 5.90 1.80 - 7.30 E9/L    Immature Granulocytes # 0.03 E9/L    Lymphocytes Absolute 1.02 (L) 1.50 - 4.00 E9/L    Monocytes Absolute 0.58 0.10 - 0.95 E9/L    Eosinophils Absolute 0.16 0.05 - 0.50 E9/L    Basophils Absolute 0.03 0.00 - 0.20 E9/L   Comprehensive Metabolic Panel w/ Reflex to MG   Result Value Ref Range    Sodium 137 132 - 146 mmol/L    Potassium reflex Magnesium 3.7 3.5 - 5.0 mmol/L    Chloride 98 98 - 107 mmol/L    CO2 25 22 - 29 mmol/L    Anion Gap 14 7 - 16 mmol/L    Glucose 106 (H) 74 - 99 mg/dL    BUN 12 8 - 23 mg/dL    CREATININE 0.5 0.5 - 1.0 mg/dL    GFR Non-African American >60 >=60 mL/min/1.73    GFR African American >60     Calcium 9.3 8.6 - 10.2 mg/dL    Total Protein 7.4 6.4 - 8.3 g/dL    Alb 4.2 3.5 - 5.2 g/dL    Total Bilirubin 0.4 0.0 - 1.2 mg/dL    Alkaline Phosphatase 90 35 - 104 U/L    ALT 18 0 - 32 U/L    AST 33 (H) 0 - 31 U/L Troponin   Result Value Ref Range    Troponin <0.01 0.00 - 0.03 ng/mL   Brain Natriuretic Peptide   Result Value Ref Range    Pro-BNP 1,424 (H) 0 - 125 pg/mL   Lactic Acid, Plasma   Result Value Ref Range    Lactic Acid 1.1 0.5 - 2.2 mmol/L   D-dimer, quantitative   Result Value Ref Range    D-Dimer, Quant 300 ng/mL DDU       RADIOLOGY:  CTA CHEST W CONTRAST   Final Result   There is severe tracheomalacia with very prominent narrowing of the   tracheal airway in the upper chest and critical narrowing in the   mainstem bronchi, more prominently on the right. Negative for evidence of pulmonary embolism, organized pneumonia, or   pleural effusion. There is some peripheral atelectasis in the upper and lower lungs   bilaterally. .            ALERT:  THIS IS AN ABNORMAL REPORT- there is severe tracheomalacia   with very prominent narrowing of the trachea in the upper mediastinum   and thoracic inlet, and mainstem bronchi are very severely narrowed,   more prominently on the right, in the hilar regions. XR CHEST PORTABLE   Final Result      1. Enlarged cardiac silhouette. No evidence of overt failure. 2. Subtle left basilar opacity is likely an artifact of under   penetration. No definite consolidation is seen. EKG: This EKG is signed and interpreted by me. Rate: 76  Rhythm: Sinus  Interpretation: no acute changes  Comparison: stable as compared to patient's most recent EKG    ------------------------- NURSING NOTES AND VITALS REVIEWED ---------------------------  Date / Time Roomed:  8/19/2020  4:08 PM  ED Bed Assignment:  [id number]    The nursing notes within the ED encounter and vital signs as below have been reviewed.      Patient Vitals for the past 24 hrs:   BP Temp Temp src Pulse Resp SpO2 Height Weight   08/19/20 2230 (!) 147/81 97.3 °F (36.3 °C) Temporal 69 16 97 % 4' 11\" (1.499 m) 192 lb (87.1 kg)   08/19/20 2141 (!) 154/60 98.4 °F (36.9 °C) -- 72 16 95 % -- --   08/19/20 1850 (!) 154/88 -- -- 76 20 99 % -- --   08/19/20 1723 (!) 148/69 -- -- 77 16 98 % -- --   08/19/20 1617 (!) 152/78 98.4 °F (36.9 °C) -- 86 18 91 % 4' 11\" (1.499 m) 194 lb (88 kg)     Oxygen Saturation Interpretation: Abnormal and Improved after treatment    ------------------------------------------ PROGRESS NOTES ------------------------------------------  Re-evaluation(s):  Patients symptoms are improving  Repeat physical examination is not changed    Counseling:  I have spoken with the patient and discussed todays results, in addition to providing specific details for the plan of care and counseling regarding the diagnosis and prognosis. Their questions are answered at this time and they are agreeable with the plan of admission.    --------------------------------- ADDITIONAL PROVIDER NOTES ---------------------------------  Consultations:  Spoke with Dr. Divine Grayson. Discussed case. They will admit the patient. This patient's ED course included: a personal history and physicial examination, multiple bedside re-evaluations, cardiac monitoring and continuous pulse oximetry    This patient has remained hemodynamically stable during their ED course. Diagnosis:  1. Chest pain, unspecified type    2. Shortness of breath    3. Tracheomalacia      Disposition:  Patient's disposition: Admit to telemetry  Patient's condition is fair. 8/20/20, 2:48 AM EDT.     This note is prepared by Natasha Alejo MD -PGY-1         Natasha Alejo MD  Resident  08/20/20 0358

## 2020-08-20 NOTE — CONSULTS
Kendal Reeder M.D.,[facility]  Willy Dsouza D.O., [facility], Jammie Gonzalez M.D. Yessenia Gonzalez M.D., Cassie Killian M.D. Mag Flores D.O. Patient:  Zach Hester 77 y.o. female MRN: [id number]     Date of Service: 8/20/2020      PULMONARY CONSULTATION    Reason for Consultation: Tracheomalacia, shortness of breath  Referring Physician: Dr. Brothers [place]    Communication with the referring physician will be sent via the electronic medical record. Chief Complaint:     CODE STATUS: Prior    SUBJECTIVE:  HPI:  Zach Hester is a 77 y.o. female who presented to the ED 1 day ago due to shortness of breath and heaviness in her chest.  Significant past medical history of PFO, status post CVA, hypertension nonischemic cardiomyopathy, CAD, hiatal hernia, ROSALVA not treated with CPAP, thoracic outlet obstruction, depression. ER evaluation showed elevated proBNP 1424, lactic acid 1.2 d-dimer 300 CT of the chest revealed severe tracheomalacia with prominent narrowing of the tracheal airway in the upper chest and critical narrowing in the mainstem bronchus were apartment on the right, negative troponin. Our service has been consulted for tracheomalacia and shortness of breath. Patient is known to our service and last seen in the office on  7/10/2018 with Dr. Xenia Sutton  for ROSALVA, sleep study showed mild ROSALVA she was recommended to have a CPAP in the setting of mild ROSALVA due to previous CVA and cardiac condition. A titration study was ordered this was not completed by the patient. Patient seen in room she is sitting up in bed she appears in no acute distress she is on no oxygen. She denies shortness of breath or any chest heaviness or pain today. She has been up walking to the bathroom with no shortness of breath.   She reports this history of intermittent shortness of breath has been going on for at least a year, in addition she has had left lower rib pain intermittently for several years she Attends meetings of clubs or organizations: Not on file     Relationship status: Not on file    Intimate partner violence     Fear of current or ex partner: Not on file     Emotionally abused: Not on file     Physically abused: Not on file     Forced sexual activity: Not on file   Other Topics Concern    Not on file   Social History Narrative    Not on file     ETOH:   reports no history of alcohol use. Exposures: There  is not history of TB or TB exposure. There is not asbestos or silica dust exposure. The patient reports does not have coal, foundry, quarry or [employer] exposure. Recent travel history none. There is not  history of recreational or IV drug use. There is not hot tub exposure. The patient does not have any exotic pets, turtles or exotic birds.        Immunization History   Administered Date(s) Administered    Influenza Vaccine, unspecified formulation 10/10/2017    Influenza Virus Vaccine 10/10/2017    Influenza, High Dose (Fluzone 65 yrs and older) 09/12/2017, 09/23/2019    Pneumococcal Polysaccharide ([id number]) 11/11/2019    Zoster Recombinant (Shingrix) 03/10/2019        Home Meds: Medications Prior to Admission: carvedilol (COREG) 3.125 MG tablet, Take 1 tablet by mouth 2 times daily (with meals)  fluticasone (FLONASE) 50 MCG/ACT nasal spray, 2 sprays by Nasal route every morning  levothyroxine (SYNTHROID) 100 MCG tablet, Take 1 tablet by mouth Daily  omeprazole (PRILOSEC) 20 MG delayed release capsule, Take 1 capsule by mouth 2 times daily  sertraline (ZOLOFT) 50 MG tablet, Take 1 tablet by mouth daily (Patient taking differently: Take 50 mg by mouth nightly )  simvastatin (ZOCOR) 20 MG tablet, Take 1 tablet by mouth nightly  sucralfate (CARAFATE) 1 GM tablet, Take 1 tablet by mouth 2 times daily Indications: pt had on 6/2/20  mesalamine (LIALDA) 1.2 g EC tablet, TAKE 1 TABLET BY MOUTH DAILY  nitroGLYCERIN (NITROSTAT) 0.4 MG SL tablet, Place 1 tablet under the tongue every 5 minutes as needed for Chest pain  aspirin 81 MG EC tablet, Take 1 tablet by mouth daily  ascorbic acid (VITAMIN C) 500 MG tablet, Take 500 mg by mouth daily. multivitamin-iron-minerals-folic acid (CENTRUM) chewable tablet, Take 1 tablet by mouth daily. isosorbide mononitrate (IMDUR) 30 MG extended release tablet, Take 1 tablet by mouth daily    CURRENT MEDS :  Scheduled Meds:   enoxaparin  40 mg Subcutaneous Daily    pantoprazole  40 mg Oral QAM AC    levothyroxine  100 mcg Oral Daily    carvedilol  3.125 mg Oral BID WC    isosorbide mononitrate  30 mg Oral Daily    sertraline  50 mg Oral Daily    atorvastatin  20 mg Oral Nightly    aspirin  81 mg Oral Daily    mesalamine  1.2 g Oral Daily with breakfast       Continuous Infusions: Allergies   Allergen Reactions    Biaxin [Clarithromycin] Nausea And Vomiting and Other (See Comments)     Abdominal pain    Codeine Nausea And Vomiting    Neosporin [Bacitracin-Neomycin-Polymyxin] Swelling    Sulfa Antibiotics Itching and Nausea And Vomiting     decrease in Blood pressure.  Clarithromycin Other (See Comments)    Neomycin-Polymyxin-Gramicidin Swelling     redness    Septra [Sulfamethoxazole-Trimethoprim]     Vioxx [Rofecoxib]     Sulfasalazine Nausea And Vomiting     severe headaches. REVIEW OF SYSTEMS:  Constitutional: Denies fever, weight loss, night sweats, and fatigue  Skin: Denies pigmentation, dark lesions, and rashes   HEENT: Denies hearing loss, tinnitus, ear drainage, epistaxis, sore throat, and hoarseness. Cardiovascular: Denies palpitations, chest pain, and chest pressure. Respiratory: Denies cough, dyspnea at rest, hemoptysis, apnea, and choking.   Gastrointestinal: Denies nausea, vomiting, poor appetite, diarrhea, heartburn or reflux  Genitourinary: Denies dysuria, frequency, urgency or hematuria  Musculoskeletal: Denies myalgias, muscle weakness, and bone pain  Neurological: Denies dizziness, vertigo, headache, and focal weakness  Psychological: Denies anxiety and depression  Endocrine: Denies heat intolerance and cold intolerance  Hematopoietic/Lymphatic: Denies bleeding problems and blood transfusions    OBJECTIVE:   /60   Pulse 67   Temp 96.6 °F (35.9 °C) (Temporal)   Resp 16   Ht 4' 11\" (1.499 m)   Wt 192 lb (87.1 kg)   SpO2 98%   BMI 38.78 kg/m²   SpO2 Readings from Last 1 Encounters:   20 98%        I/O:  No intake or output data in the 24 hours ending 20 1021  Vent Information  SpO2: 98 %                Physical Exam:  General: The patient is lying in bed comfortably without any distress. Breathing is not labored  HEENT: Pupils are equal round and reactive to light, there are no oral lesions and no post-nasal drip   Neck: supple without adenopathy  Cardiovascular: regular rate and rhythm without murmur or gallop  Respiratory: Clear to auscultation bilaterally without wheezing or crackles. Air entry is symmetric  Abdomen: soft, non-tender, non-distended, normal bowel sounds  Extremities: warm, no edema, no clubbing  Skin: no rash or lesion  Neurologic: CN II-XII grossly intact, no focal deficits    Pulmonary Function Testing personally reviewed and interpreted none      Imaging personally reviewed:  Patient MRN:  [id number]    : 1950    Age: 77 years    Gender: Female    Order Date:  2020 8:08 PM         EXAM: CTA CHEST W CONTRAST         NUMBER OF IMAGES:  410         INDICATION: Shortness of breath, transient intermittent dizziness         COMPARISON: Anterior upright chest 1733 hours today         Technique: Low-dose CT  acquisition technique included one of    following options; 1 . Automated exposure control, 2. Adjustment of MA    and or KV according to patient's size or 3. Use of iterative    reconstruction. Contiguous spiral images were obtained in the axial    plane, following the administration of 75 intravenous contrast using    CT angiographic protocol.  Sagittal and coronal narrowing in the mainstream bronchi  2. Shortness of breath  3. Chest pain  4. ROSALVA not treated with CPAP,  patient did not complete titration study as recommended  5. Nicotine dependence in remission  6. Obesity  7. History PFO    Plan:  1. Keep pulse oximetry greater than 92% currently on 2 L 98% please wean  2. Cardiology consulted for chest pain  3. Will need outpatient sleep titration study, and PFT      Thank you for allowing me to participate in the care of Jesús Cueva. Please feel free to call with questions. This plan of care was reviewed in collaboration with Dr. Kaur Fisher    Electronically signed by IZZY Roche on 8/20/2020 at 10:21 AM      Addendum;     I have personally seen and examined Mrs. Ivery Najjar. Her imaging and previous studies has been reviewed by me independently. In summary Mrs. Ivery Najjar is a 61-year-old  female with a history of mild sleep apnea diagnosed back in 2018 with an apnea hypopnea index of 6 but because of her cardiac history including a PFO and a history of CVA it was recommended for her to get a CPAP titration which appears she change her mind and did not follow through. Patient presented to the emergency department with significant dyspnea which occurred while she was eating food. She tells me basically she felt like she could not breathe. She has had couple of these episodes with the last couple weeks it seems as always associated with eating food. As a part of her work-up she had a CT of the chest in the ER which shows significantly tracheomalacia. Patient last EGD was in 2014 . At that time just showed mild gastritis. After reviewing her CAT scan explained to her we best to first proceed with bronchoscopy for airway inspection. To make sure there are no endobronchial lesions. Also give me a better assessment of the dynamics of her airway. Also explained to her considering her condition she will benefit from Pap therapy. Also discussed about weight loss. If her bronchoscopy results are within normal she will need evaluation of her esophagus probably first esophagram and another endoscopy. This point she is agreeable with the procedure. She has been scheduled for bronchoscopy tomorrow at 8:30 in the morning. Will benefit from PFTs as an outpatient.     Thank you for your referral.    Dalia Jackson MD

## 2020-08-20 NOTE — PLAN OF CARE
Problem: Respiratory:  Goal: Respiratory status will improve  Description: Respiratory status will improve  Outcome: Met This Shift

## 2020-08-20 NOTE — H&P
[address]                  [address] [facility],  [facility]                              HISTORY AND PHYSICAL    PATIENT NAME: Shira Brewer                 :        1950  MED REC NO:   [id number]                            ROOM:       8209  ACCOUNT NO:   [de-identified]                           ADMIT DATE: 2020  PROVIDER:     Toyin Hernandez DO    CHIEF COMPLAINT:  Reason for this hospital admission is that of  shortness of breath and heaviness in her chest.    HISTORY OF PRESENT ILLNESS:  The patient is a 59-year-old female with  history of PFO status post CVA, hypertension, nonischemic  cardiomyopathy, coronary artery disease, hiatal hernia, obstructive  sleep apnea, not treated with CPAP, hypothyroidism, thoracic outlet  obstruction and history of major depressive disorder. She is normally  seen by [facility] through  _____. She presents to this  emergency room department with chest pressure and shortness of breath. The patient states that she was at work earlier on the day of this  admission when she developed a chest discomfort and she felt as if there  was a pressure and heaviness isolated underneath her left breast.  911  was called and the patient was brought to this emergency room  department. Upon arrival, she was on nasal cannula oxygen. Her WBC  count was 7.7, H and H of 9.9 and 31.9 respectively, platelets were  970,779. Sodium was 137, potassium 3.7, chloride was 98, CO2 was 25,  BUN 12, creatinine 0.5. Troponin was less than 0.01. ProBNP was 1424. Lactic acid 1.2, D-dimer was 300. CTA of the chest revealed severe  tracheomalacia with prominent narrowing of the tracheal airway and the  upper chest and critical narrowing in the main stem bronchus, more  prominent on the right. The patient denied any known stridor. Her EKG  revealed sinus rhythm without any acute changes.   She was admitted with  appropriate consultations requested. MEDICATIONS:  Her recent and present medications include Synthroid,  omeprazole, Zoloft, Zocor, aspirin, Imdur and Coreg. PAST SURGICAL HISTORY:  Consistent with heart catheterization. She has  also had previous lithotripsy. SOCIAL HISTORY:  She quit smoking 21 years ago. She does not consume  alcohol or use illicit drugs. FAMILY HISTORY:  Alzheimer's disease in mother, coronary artery disease  in her mother, heart disease in her brother. ALLERGIES:  To BIAXIN, CODEINE, NEOSPORIN, SULFA and VIOXX. REVIEW OF SYSTEMS:  Remainder of systems is otherwise negative except  for the above-mentioned. PHYSICAL EXAMINATION:  GENERAL:  Physical assessment reveals a 59-year-old female who I am  seeing this morning and she appears to be comfortable. She denies being  short of breath. No chest pain. VITAL SIGNS:  As follows, blood pressure 147/81, temperature of 97.3,  pulse rate of 69, respiratory rate of 16, pulse ox is 97%. HEENT:  ENT exam is negative. HEART:  Regular. LUNGS:  Demonstrate diminished breath sounds in both lung bases. ABDOMEN:  Noted to be soft with positive bowel sounds. EXTREMITIES:  Reveal no evidence for edema and/or cyanosis. ASSESSMENT:  1. Shortness of breath with CTA evidence for severe tracheomalacia. 2.  Chest pressure in this patient with known hypertension and  nonischemic cardiomyopathy, coupled with coronary artery disease. 3.  Obesity, secondary to excess calories. 4.  History of PFO. 5.  Hiatal hernia. 6.  Obstructive sleep apnea, untreated. 7.  Hypothyroidism. 8.  Major depressive disorder. PLAN OF TREATMENT:  See orders. Appropriate consultations. Discharge  plan will be to home when medically _____.         Lashell Padilla DO    D: 08/20/2020 6:42:53       T: 08/20/2020 6:50:55     [email or web address]  Job#: [id number]     Doc#: [id number]    CC:

## 2020-08-20 NOTE — PROGRESS NOTES
Hanna Hannon was ordered Lialda (mesalamine) 1.2g tabs which are a nonformulary medication. The patient's home supply of this medication should be brought in to the hospital for inpatient use. If the medication has not been administered by 1400 on the following day from the time the order was placed, a pharmacist will follow-up with the nurse of the patient to assess the capability of the patient to bring in the medication. If it is determined that the patient cannot supply the medication and it is not available to be dispensed from the pharmacy, a call will be placed to the ordering provider to discuss alternative options.

## 2020-08-20 NOTE — ANESTHESIA PRE PROCEDURE
Department of Anesthesiology  Preprocedure Note       Name:  Jesse Horta   Age:  77 y.o.  :  1950                                          MRN:  [id number]         Date:  2020      Surgeon: Fabricio Weaver):  Fani Car MD    Procedure: Procedure(s):  BRONCHOSCOPY    Medications prior to admission:   Prior to Admission medications    Medication Sig Start Date End Date Taking? Authorizing Provider   carvedilol (COREG) 3.125 MG tablet Take 1 tablet by mouth 2 times daily (with meals) 7/22/20 10/20/20 Yes Pamela Hernandez DO   fluticasone (FLONASE) 50 MCG/ACT nasal spray 2 sprays by Nasal route every morning 20 Yes Pamela Hernandez DO   levothyroxine (SYNTHROID) 100 MCG tablet Take 1 tablet by mouth Daily 7/22/20 10/20/20 Yes Pamela Hernandez DO   omeprazole (PRILOSEC) 20 MG delayed release capsule Take 1 capsule by mouth 2 times daily 20 Yes Pamela Hernandez DO   sertraline (ZOLOFT) 50 MG tablet Take 1 tablet by mouth daily  Patient taking differently: Take 50 mg by mouth nightly  20 Yes Pamela Hernandez DO   simvastatin (ZOCOR) 20 MG tablet Take 1 tablet by mouth nightly 7/22/20 10/20/20 Yes Pamela Hernandez DO   sucralfate (CARAFATE) 1 GM tablet Take 1 tablet by mouth 2 times daily Indications: pt had on 20 Yes Pamela Hernandez DO   mesalamine (LIALDA) 1.2 g EC tablet TAKE 1 TABLET BY MOUTH DAILY 3/28/20  Yes Historical Provider, MD   nitroGLYCERIN (NITROSTAT) 0.4 MG SL tablet Place 1 tablet under the tongue every 5 minutes as needed for Chest pain 19  Yes Pamela Hernandez DO   aspirin 81 MG EC tablet Take 1 tablet by mouth daily 3/14/18  Yes Lady Robbin APRN - CNS   ascorbic acid (VITAMIN C) 500 MG tablet Take 500 mg by mouth daily. Yes Historical Provider, MD   multivitamin-iron-minerals-folic acid (CENTRUM) chewable tablet Take 1 tablet by mouth daily.      Yes Historical Provider, MD   isosorbide mononitrate (IMDUR) 30 MG extended release tablet Take 1 tablet by mouth daily 6/16/20   Helen Gaviria MD       Current medications:    Current Facility-Administered Medications   Medication Dose Route Frequency Provider Last Rate Last Dose    enoxaparin (LOVENOX) injection 40 mg  40 mg Subcutaneous Daily Pamela Buccino, DO   40 mg at 08/20/20 0807    pantoprazole (PROTONIX) tablet 40 mg  40 mg Oral QAM AC Pamela Buccino, DO   40 mg at 08/20/20 0656    levothyroxine (SYNTHROID) tablet 100 mcg  100 mcg Oral Daily Pamela Buccino, DO   100 mcg at 08/20/20 0808    carvedilol (COREG) tablet 3.125 mg  3.125 mg Oral BID WC Pamela Buccino, DO   Stopped at 08/20/20 0808    isosorbide mononitrate (IMDUR) extended release tablet 30 mg  30 mg Oral Daily Pamela Buccino, DO   30 mg at 08/20/20 0808    sertraline (ZOLOFT) tablet 50 mg  50 mg Oral Daily Pamela Buccino, DO   50 mg at 08/20/20 0808    atorvastatin (LIPITOR) tablet 20 mg  20 mg Oral Nightly Pamela Buccino, DO        aspirin chewable tablet 81 mg  81 mg Oral Daily Pamela Buccino, DO   81 mg at 08/20/20 0807    mesalamine (LIALDA) EC tablet 1.2 g  1.2 g Oral Daily with breakfast Pamela Buccino, DO        sodium chloride flush 0.9 % injection 10 mL  10 mL Intravenous PRN Emily Morelos II, MD   10 mL at 08/20/20 9218       Allergies: Allergies   Allergen Reactions    Biaxin [Clarithromycin] Nausea And Vomiting and Other (See Comments)     Abdominal pain    Codeine Nausea And Vomiting    Neosporin [Bacitracin-Neomycin-Polymyxin] Swelling    Sulfa Antibiotics Itching and Nausea And Vomiting     decrease in Blood pressure.  Clarithromycin Other (See Comments)    Neomycin-Polymyxin-Gramicidin Swelling     redness    Septra [Sulfamethoxazole-Trimethoprim]     Vioxx [Rofecoxib]     Sulfasalazine Nausea And Vomiting     severe headaches.        Problem List:    Patient Active Problem List   Diagnosis Code    PFO (patent foramen ovale) Q21.1    CVA (cerebral vascular accident) ([facility] [address] I63.9    Hypertension I10    History of thoracic outlet syndrome Z86.69    Hiatal hernia K44.9    Nephrolithiasis N20.0    Hypothyroidism E03.9    Nonischemic cardiomyopathy (HCC) I42.8    ROSALVA (obstructive sleep apnea) G47.33    Class 2 severe obesity due to excess calories with serious comorbidity and body mass index (BMI) of 39.0 to 39.9 in adult (HCC) E66.01, Z68.39    Coronary artery disease involving native coronary artery of native heart without angina pectoris I25.10    Superficial phlebitis and thrombophlebitis of right lower extremity I80.01    Severe episode of recurrent major depressive disorder, without psychotic features ([facility] [address] F33.2    CAD in native artery I25.10    Chest pain R07.9       Past Medical History:        Diagnosis Date    Arthritis     CAD (coronary artery disease)     Cerebrovascular disease     Chronic back pain     Congenital heart disease     CVA (cerebral vascular accident) ([facility] [address] 0/2377    Embolic. ? tia?  Depression     Fracture of right shoulder 1/2011    Frequent UTI     GERD (gastroesophageal reflux disease)     H/O ligation of vein 1974 Bilaterally.  Hiatal hernia 2000    History of thoracic outlet syndrome     Hypercholesterolemia     Hypertension     Hypothyroidism     Kidney stones     Nephrolithiasis     Bilaterally.  Nonischemic cardiomyopathy ([address] 6/25/2018    ROSALVA (obstructive sleep apnea) 04/16/2018    mild    Osteoarthritis     PFO (patent foramen ovale)     Pneumonia 1989    Thyroid disease     Hypothyroidism. Past Surgical History:        Procedure Laterality Date    CARDIAC CATHETERIZATION  03/05/2018    No significant CAD  (Dr Tammy Sequeira)    COLONOSCOPY  11/04    Negative.  COLONOSCOPY  09/17/2013    COLONOSCOPY  12/30/2016    Sigmoid Colitis, Diverticulitis    DIAGNOSTIC CARDIAC CATH LAB PROCEDURE  5/17/1988    [facility], patent coronaries.  DIAGNOSTIC CARDIAC CATH LAB PROCEDURE  6/10/1992    [facility], patent coronaries.     DIAGNOSTIC CARDIAC CATH LAB PROCEDURE  10/26/2001    [facility], Normal cath, right femoral angiography and Perclose.  DIAGNOSTIC CARDIAC CATH LAB PROCEDURE  2005    [facility]    HYSTERECTOMY  1983    LITHOTRIPSY      MIDDLE EAR SURGERY      Eardrum replaced in right ear.  TONSILLECTOMY      URETHRA SURGERY      VASCULAR SURGERY      veil ligation kylee legs       Social History:    Social History     Tobacco Use    Smoking status: Former Smoker     Packs/day: 3.00     Years: 24.00     Pack years: 72.00     Types: Cigarettes     Start date:      Last attempt to quit: 1999     Years since quittin.6    Smokeless tobacco: Never Used    Tobacco comment: quit 17 yerars ago   Substance Use Topics    Alcohol use: No     Alcohol/week: 0.0 standard drinks     Comment: denies caffeine                                Counseling given: Not Answered  Comment: quit 17 yerars ago      Vital Signs (Current):   Vitals:    20 2141 20 2230 20 0736 20 1228   BP: (!) 154/60 (!) 147/81 130/60    Pulse: 72 69 67    Resp: 16 16 16    Temp: 36.9 °C (98.4 °F) 36.3 °C (97.3 °F) 35.9 °C (96.6 °F)    TempSrc:  Temporal Temporal    SpO2: 95% 97% 98% 98%   Weight:  192 lb (87.1 kg)     Height:  4' 11\" (1.499 m)                                                BP Readings from Last 3 Encounters:   20 130/60   20 126/88   20 (!) 112/57       NPO Status:   pt instructed to be NPO at [facility]                                                                             BMI:   Wt Readings from Last 3 Encounters:   20 192 lb (87.1 kg)   20 196 lb 9.6 oz (89.2 kg)   20 200 lb (90.7 kg)     Body mass index is 38.78 kg/m².     CBC:   Lab Results   Component Value Date    WBC 5.9 2020    RBC 3.89 2020    HGB 10.0 2020    HCT 32.5 2020    MCV 83.5 2020    RDW 14.0 2020     2020       CMP:   Lab Results   Component Value Date     reviewed and agrees with Pre Eval content            Eugenio Camargo RN   8/20/2020        Pt seen, examined, chart reviewed, plan discussed. Cisco Caballero  8/20/2020  2:49 PM     DOS STAFF ADDENDUM:    Pt seen and examined, physical exam updated, chart reviewed including anesthesia, drug and allergy history. H&P reviewed. No interval changes to history or physical examination (unless noted above). NPO status confirmed. Anesthetic plan, risks, benefits, alternatives discussed with patient. Patient verbalized an understanding and agrees to proceed.      Jacob Alfaro MD   Anesthesiologist

## 2020-08-20 NOTE — CONSULTS
Inpatient Cardiology Consultation      Reason for Consult:  Chest pain     Consulting Physician: Dr. Bernard Camp    Requesting Physician:  Dr. Anil Hamilton     Date of Consultation: 8/20/2020    History of Present llness:    Mrs. Jarocho Chicas is a 77year old lady who is followed at our practice by Dr. Bernard Camp. Her past medical history includes coronary artery disease; CVA in 2006 with documented PFO; and hypertension. She had positive stress test in June 2020, and subsequent catheterization showed moderate mid RCA stenosis and she was prescribed Imdur. However, she has yet to begin taking it, as she is reluctant to start another medication. The night before last, she lifted a heavy vacuum. The following day, she had sharp pain in her left rib cage, radiating to her back, which began at rest and was intermittent for about fifteen to twenty minutes before resolving spontaneously. A short time later, she was eating a sandwich and drinking root beer when she felt as though she was unable to swallow or breathe. After she was able to swallow, she remained dyspneic, and therefore came to the ER. She had no chest pain during this time. On arrival, troponin was negative, BNP was 1424, and CT of the chest showed severe tracheomalacia with prominent narrowing of the tracheal airway and mainstem bronchus. Currently, she is resting in bed and appears ill but in no acute distress. She has been pain free today, but has continued to have random episodic chest pain since her catheterization. She is unsure of when her last EGD was. Past Medical History:    1. Obesity. BMI 39.  2. Allergy to Biaxin, codeine, neosporin and sulfa. 3. Hypertension. 4. Hyperlipidemia. 5. Hypothyroidism. 6. Depression. 7. Embolic CVA in 9360. 8. SARAH demonstrated a small PFO. Evaluated at [facility] and offered PFO closure as part of a CLOSURE I trial. Patient refused. Treated with aspirin. 9. No history of diabetes, CAD or PAD. 10. Echo 07/09/2007. EF 61%.  Stage I diastolic dysfunction. No significant valvulopathy. 11. Lexiscan MPS 11/16/11. Normal perfusion. Breast attenuation artifact. EF 56%. 12. Lexiscan MPS 11/20/14. Normal perfusion. EF 51%. No change from previous study. 13. Admitted to [facility] 3/02/2018 to 3/06/2018 due to chest pain. Troponin peaked at 0.1  14. Pearley Formica nuclear stress test 3/03/2018: mild ischemia involving the anterior walls  15. Echocardiogram 3/04/2018: Doppler consistent with diastolic dysfunction. Left ventricle dilated. Normal left ventricular wall thickness. EF 40-45%. Moderate to severe anterior wall hypokinesis. Mild mitral valve regurgitation. Trace/mild aortic valve regurgitation. 16. Cardiac catheterization 3/05/2018: Left main short in length, moderate in caliber with luminal irregularities throughout its course. LAD long in length, large in caliber with luminal irregularities throughout its course. The diagonal system is moderate in caliber, and has luminal irregularities throughout its course. The LCx is long in length and large in caliber. There is very mild disease of about 20% in the proximal segment before its bifurcation. The AV groove branch is small in caliber. The obtuse marginal is large in caliber with a 20% lesion in its proximal segment. The RCA is long in length and large in caliber. There is another mild 20-30% lesion in the [facility] segment. The PDA and PLB are both of large caliber. They have luminal irregularities throughout their course. The LVEDP was 11 mmHg. Ejection fraction 50%. 17. Diverticulosis and colitis  18. GERD and hiatal hernia   19. Osteoarthritis  20. Remote tobacco abuse  21. Sleep study 04/16/2018.  Mild obstructive sleep apnea. 22. SARAH, 7/9/2018, Dr. Gillian Hoang, low normal systolic function, no left atrial thrombus, PFO with mild degree of left-to-right shunt, mild MR and AR, mild to moderate TR.  23. Lexiscan stress test 6/03/2020:  The gated SPECT stress imaging in the short, vertical long, and horizontal long axis demonstrated moderate size anterior defect of moderate intensity.  The defect was reversible at rest. Gated images revealed anterior wall severe hypokinesis with an ejection   fraction 47%. 24. Left heart cath 6/23/2020: CORONARY ANATOMY: Left main:  It is a large and short artery with no angiographic stenosis noted. LAD:  It is a large artery wrapping around the apex and giving rise to three diagonal branch branches and several septal perforators. There was 20 to 30% discrete mid LAD stenosis with probably 50% ostial discrete second diagonal branch stenosis. The first diagonal was very small. The second diagonal was fair in size and the third diagonal was   smaller. Left circumflex: It is a large artery giving rise to a large bifurcating  obtuse marginal branch and continues to the AV groove. There was 30 to 40%  discrete proximal OM1 stenosis. Right coronary artery: It is a large dominant  artery giving rise to a conus branch, an RV marginal branch, a PDA and a PLV  branch. There was 50 to 60% tubular mid RCA stenosis and 40 to 50% ostial  proximal PDA stenosis. Left ventriculogram:  Revealed an ejection fraction of  55%. No mitral regurgitation was noted. IFR obtained three times was 0.93, 0.92  and 0.92. IMPRESSION:  Moderate non-physiologically significant tubular mid  RCA stenosis. 40% ostial proximal PDA stenosis. 50% ostial second diagonal  stenosis. 30 to 40% proximal OM1 stenosis. LVEDP 16 mmHg. Ejection  fraction of 55%. Medications Prior to Admit:  Prior to Admission medications    Medication Sig Start Date End Date Taking?  Authorizing Provider   carvedilol (COREG) 3.125 MG tablet Take 1 tablet by mouth 2 times daily (with meals) 7/22/20 10/20/20 Yes Pamela Bucsamo, DO   fluticasone (FLONASE) 50 MCG/ACT nasal spray 2 sprays by Nasal route every morning 7/22/20 8/21/20 Yes Pamela Buccino, DO   levothyroxine (SYNTHROID) 100 MCG tablet Take 1 tablet by mouth Daily 7/22/20 10/20/20 Yes Pamela Hernandez, DO   omeprazole (PRILOSEC) 20 MG delayed release capsule Take 1 capsule by mouth 2 times daily 7/22/20 8/21/20 Yes Pamela Bucsamo, DO   sertraline (ZOLOFT) 50 MG tablet Take 1 tablet by mouth daily  Patient taking differently: Take 50 mg by mouth nightly  7/22/20 8/21/20 Yes Pamela Elenao, DO   simvastatin (ZOCOR) 20 MG tablet Take 1 tablet by mouth nightly 7/22/20 10/20/20 Yes Pamela Buccino, DO   sucralfate (CARAFATE) 1 GM tablet Take 1 tablet by mouth 2 times daily Indications: pt had on 6/2/20 7/22/20 8/21/20 Yes Pmaela Hernandez, DO   mesalamine (LIALDA) 1.2 g EC tablet TAKE 1 TABLET BY MOUTH DAILY 3/28/20  Yes Historical Provider, MD   nitroGLYCERIN (NITROSTAT) 0.4 MG SL tablet Place 1 tablet under the tongue every 5 minutes as needed for Chest pain 11/11/19  Yes Pamela Hernandez DO   aspirin 81 MG EC tablet Take 1 tablet by mouth daily 3/14/18  Yes IZZY Mendoza - CNS   ascorbic acid (VITAMIN C) 500 MG tablet Take 500 mg by mouth daily. Yes Historical Provider, MD   multivitamin-iron-minerals-folic acid (CENTRUM) chewable tablet Take 1 tablet by mouth daily.      Yes Historical Provider, MD   isosorbide mononitrate (IMDUR) 30 MG extended release tablet Take 1 tablet by mouth daily 6/16/20   Martin Terrell MD       Current Medications:    Current Facility-Administered Medications: enoxaparin (LOVENOX) injection 40 mg, 40 mg, Subcutaneous, Daily  pantoprazole (PROTONIX) tablet 40 mg, 40 mg, Oral, QAM AC  levothyroxine (SYNTHROID) tablet 100 mcg, 100 mcg, Oral, Daily  carvedilol (COREG) tablet 3.125 mg, 3.125 mg, Oral, BID WC  isosorbide mononitrate (IMDUR) extended release tablet 30 mg, 30 mg, Oral, Daily  sertraline (ZOLOFT) tablet 50 mg, 50 mg, Oral, Daily  atorvastatin (LIPITOR) tablet 20 mg, 20 mg, Oral, Nightly  aspirin chewable tablet 81 mg, 81 mg, Oral, Daily  mesalamine (LIALDA) EC tablet 1.2 g, 1.2 g, Oral, Daily with breakfast  sodium chloride flush 0.9 % injection 10 mL, 10 mL, Intravenous, PRN    Allergies:  Biaxin [clarithromycin]; Codeine; Neosporin [bacitracin-neomycin-polymyxin]; Sulfa antibiotics; Clarithromycin; Neomycin-polymyxin-gramicidin; Septra [sulfamethoxazole-trimethoprim]; Vioxx [rofecoxib]; and Sulfasalazine    Social History:    Social History     Socioeconomic History    Marital status:       Spouse name: Not on file    Number of children: Not on file    Years of education: Not on file    Highest education level: Not on file   Occupational History    Not on file   Social Needs    Financial resource strain: Not on file    Food insecurity     Worry: Not on file     Inability: Not on file    Transportation needs     Medical: Not on file     Non-medical: Not on file   Tobacco Use    Smoking status: Former Smoker     Packs/day: 3.00     Years: 24.00     Pack years: 72.00     Types: Cigarettes     Start date:      Last attempt to quit: 1999     Years since quittin.6    Smokeless tobacco: Never Used    Tobacco comment: quit 17 yerars ago   Substance and Sexual Activity    Alcohol use: No     Alcohol/week: 0.0 standard drinks     Comment: denies caffeine    Drug use: No    Sexual activity: Not on file   Lifestyle    Physical activity     Days per week: Not on file     Minutes per session: Not on file    Stress: Not on file   Relationships    Social connections     Talks on phone: Not on file     Gets together: Not on file     Attends Mu-ism service: Not on file     Active member of club or organization: Not on file     Attends meetings of clubs or organizations: Not on file     Relationship status: Not on file    Intimate partner violence     Fear of current or ex partner: Not on file     Emotionally abused: Not on file     Physically abused: Not on file     Forced sexual activity: Not on file   Other Topics Concern    Not on file   Social History Narrative    Not on file       Family History:   Family movements. : Deferred  SKIN: Warm and dry   NEURO / PSYCH: Oriented to person, place and time. Speech clear and appropriate. Follows all commands. Pleasant affect     Telemetry: SR  ECG: SR, 82, nonspecific ST-T wave changes     No intake or output data in the 24 hours ending 08/20/20 1135    Labs:   CBC:   Recent Labs     08/19/20  1701 08/20/20  0940   WBC 7.7 5.9   HGB 9.9* 10.0*   HCT 31.9* 32.5*    203     BMP:   Recent Labs     08/19/20  1701 08/20/20  0940    140   K 3.7 3.9   CO2 25 27   BUN 12 9   CREATININE 0.5 0.6   LABGLOM >60 >60   CALCIUM 9.3 9.2     proBNP:   Recent Labs     08/19/20  1701   PROBNP 1,424*     CARDIAC ENZYMES:  Recent Labs     08/19/20  1701 08/20/20  0308 08/20/20  0940   TROPONINI <0.01 <0.01 <0.01     FASTING LIPID PANEL:  Lab Results   Component Value Date    CHOL 143 07/22/2020    HDL 43 07/22/2020    LDLCALC 73 07/22/2020    TRIG 135 07/22/2020     LIVER PROFILE:  Recent Labs     08/19/20  1701 08/20/20  0940   AST 33* 22   ALT 18 17   LABALBU 4.2 3.9     CTA chest with contrast 8/19/2020: Impression:      There is severe tracheomalacia with very prominent narrowing of the tracheal airway in the upper chest and critical narrowing in the mainstem bronchi, more prominently on the right. Negative for evidence of pulmonary embolism, organized pneumonia, or pleural effusion. There is some peripheral atelectasis in the upper and lower lungs bilaterally. CXR 8/19/2020: Impression:      1. Enlarged cardiac silhouette. No evidence of overt failure. 2. Subtle left basilar opacity is likely an artifact of under penetration. No definite consolidation is seen. Assessment:  1. Atypical chest pain: ? Secondary to GI   · Moderate CAD with hemodynamically insignificant RCA lesion on cath 6/2020  2. Chronic exertional dyspnea: secondary to obesity, diastolic dysfunction, untreated sleep apnea, and tracheomalacia  3. Hypertension  4. Obesity   5.  History of CVA and PFO (declined closure)    Recommendations:   1. Continue current cardiac medications  2. The rationale for Imdur was discussed at length with her, and she is agreeable to begin while hospitalized  3. Consider GI evaluation: will defer to primary service  4. Aggressive risk factor modification     The above assessment and treatment plan were made in collaboration with Dr. Zino Escobar and further recommendations will follow by him. Electronically signed by IZZY Christy on 8/20/2020 at 11:35 AM       I have personally interviewed and examined this patient who is known to me. I have reviewed her medical problems, medications, EKG and laboratory data. Assessment and plan was made in collaboration with with Bebo MAGANA. Patient chest pain sounded atypical in nature and most likely GI related. Her dyspnea on exertion is probably multifactorial in origin including tracheomalacia, diastolic dysfunction due to hypertension, pulmonary hypertension due to obstructive sleep apnea and obesity/deconditioning. Will add Imdur to her cardiac medications. Will sign off. May call if needed. Follow-up in my office 3 to 4 weeks after hospital discharge.

## 2020-08-20 NOTE — CARE COORDINATION
8/20 Transition of Care: Patient is alert and oriented. She resides alone in a one story home but has help from her daughter in law [place]. She states she is independent with ADLs and drives. She says her son lives next door to her and helps her. Her pcp is Dr Christie Concepcion and her pharmacy is [facility] in [place]. Her plan is to return home at discharge and does not anticipate any home going needs.  Sumit Morales RN  [phone]

## 2020-08-21 ENCOUNTER — ANESTHESIA (OUTPATIENT)
Dept: ENDOSCOPY | Age: 70
DRG: 202 | End: 2020-08-21
Payer: MEDICARE

## 2020-08-21 VITALS
OXYGEN SATURATION: 96 % | DIASTOLIC BLOOD PRESSURE: 109 MMHG | SYSTOLIC BLOOD PRESSURE: 182 MMHG | RESPIRATION RATE: 19 BRPM

## 2020-08-21 LAB — SARS-COV-2, NAAT: NOT DETECTED

## 2020-08-21 PROCEDURE — 2709999900 HC NON-CHARGEABLE SUPPLY: Performed by: INTERNAL MEDICINE

## 2020-08-21 PROCEDURE — 6360000002 HC RX W HCPCS: Performed by: NURSE ANESTHETIST, CERTIFIED REGISTERED

## 2020-08-21 PROCEDURE — 96372 THER/PROPH/DIAG INJ SC/IM: CPT

## 2020-08-21 PROCEDURE — 0BJ08ZZ INSPECTION OF TRACHEOBRONCHIAL TREE, VIA NATURAL OR ARTIFICIAL OPENING ENDOSCOPIC: ICD-10-PCS | Performed by: INTERNAL MEDICINE

## 2020-08-21 PROCEDURE — 3609027000 HC BRONCHOSCOPY: Performed by: INTERNAL MEDICINE

## 2020-08-21 PROCEDURE — 6370000000 HC RX 637 (ALT 250 FOR IP): Performed by: CLINICAL NURSE SPECIALIST

## 2020-08-21 PROCEDURE — 94726 PLETHYSMOGRAPHY LUNG VOLUMES: CPT

## 2020-08-21 PROCEDURE — 94060 EVALUATION OF WHEEZING: CPT

## 2020-08-21 PROCEDURE — 94660 CPAP INITIATION&MGMT: CPT

## 2020-08-21 PROCEDURE — 6370000000 HC RX 637 (ALT 250 FOR IP): Performed by: FAMILY MEDICINE

## 2020-08-21 PROCEDURE — 94729 DIFFUSING CAPACITY: CPT

## 2020-08-21 PROCEDURE — 3700000001 HC ADD 15 MINUTES (ANESTHESIA): Performed by: INTERNAL MEDICINE

## 2020-08-21 PROCEDURE — 2580000003 HC RX 258: Performed by: NURSE ANESTHETIST, CERTIFIED REGISTERED

## 2020-08-21 PROCEDURE — 2500000003 HC RX 250 WO HCPCS: Performed by: INTERNAL MEDICINE

## 2020-08-21 PROCEDURE — 2140000000 HC CCU INTERMEDIATE R&B

## 2020-08-21 PROCEDURE — 3700000000 HC ANESTHESIA ATTENDED CARE: Performed by: INTERNAL MEDICINE

## 2020-08-21 PROCEDURE — 7100000000 HC PACU RECOVERY - FIRST 15 MIN: Performed by: INTERNAL MEDICINE

## 2020-08-21 PROCEDURE — 7100000001 HC PACU RECOVERY - ADDTL 15 MIN: Performed by: INTERNAL MEDICINE

## 2020-08-21 PROCEDURE — 6360000002 HC RX W HCPCS: Performed by: FAMILY MEDICINE

## 2020-08-21 PROCEDURE — 2580000003 HC RX 258: Performed by: RADIOLOGY

## 2020-08-21 PROCEDURE — U0002 COVID-19 LAB TEST NON-CDC: HCPCS

## 2020-08-21 RX ORDER — SODIUM CHLORIDE 9 MG/ML
INJECTION, SOLUTION INTRAVENOUS CONTINUOUS PRN
Status: DISCONTINUED | OUTPATIENT
Start: 2020-08-21 | End: 2020-08-21 | Stop reason: SDUPTHER

## 2020-08-21 RX ORDER — ALBUTEROL SULFATE 2.5 MG/3ML
2.5 SOLUTION RESPIRATORY (INHALATION) EVERY 4 HOURS
Status: CANCELLED | OUTPATIENT
Start: 2020-08-21

## 2020-08-21 RX ORDER — SODIUM CHLORIDE 0.9 % (FLUSH) 0.9 %
10 SYRINGE (ML) INJECTION PRN
Status: CANCELLED | OUTPATIENT
Start: 2020-08-21

## 2020-08-21 RX ORDER — LIDOCAINE HYDROCHLORIDE 10 MG/ML
INJECTION, SOLUTION EPIDURAL; INFILTRATION; INTRACAUDAL; PERINEURAL PRN
Status: DISCONTINUED | OUTPATIENT
Start: 2020-08-21 | End: 2020-08-21 | Stop reason: ALTCHOICE

## 2020-08-21 RX ORDER — PROPOFOL 10 MG/ML
INJECTION, EMULSION INTRAVENOUS PRN
Status: DISCONTINUED | OUTPATIENT
Start: 2020-08-21 | End: 2020-08-21 | Stop reason: SDUPTHER

## 2020-08-21 RX ORDER — PROMETHAZINE HYDROCHLORIDE 25 MG/ML
6.25 INJECTION, SOLUTION INTRAMUSCULAR; INTRAVENOUS
Status: DISCONTINUED | OUTPATIENT
Start: 2020-08-21 | End: 2020-08-21 | Stop reason: HOSPADM

## 2020-08-21 RX ORDER — ALBUTEROL SULFATE 2.5 MG/3ML
2.5 SOLUTION RESPIRATORY (INHALATION) EVERY 4 HOURS PRN
Status: DISCONTINUED | OUTPATIENT
Start: 2020-08-21 | End: 2020-08-24 | Stop reason: HOSPADM

## 2020-08-21 RX ORDER — SODIUM CHLORIDE 0.9 % (FLUSH) 0.9 %
10 SYRINGE (ML) INJECTION EVERY 12 HOURS SCHEDULED
Status: CANCELLED | OUTPATIENT
Start: 2020-08-21

## 2020-08-21 RX ADMIN — ATORVASTATIN CALCIUM 20 MG: 20 TABLET, FILM COATED ORAL at 20:33

## 2020-08-21 RX ADMIN — ENOXAPARIN SODIUM 40 MG: 40 INJECTION SUBCUTANEOUS at 10:38

## 2020-08-21 RX ADMIN — ASPIRIN 81 MG: 81 TABLET, CHEWABLE ORAL at 10:38

## 2020-08-21 RX ADMIN — IPRATROPIUM BROMIDE AND ALBUTEROL 1 PUFF: 20; 100 SPRAY, METERED RESPIRATORY (INHALATION) at 20:33

## 2020-08-21 RX ADMIN — SODIUM CHLORIDE: 9 INJECTION, SOLUTION INTRAVENOUS at 07:49

## 2020-08-21 RX ADMIN — ISOSORBIDE MONONITRATE 30 MG: 30 TABLET ORAL at 10:38

## 2020-08-21 RX ADMIN — SERTRALINE 50 MG: 50 TABLET, FILM COATED ORAL at 10:38

## 2020-08-21 RX ADMIN — PROPOFOL 100 MG: 10 INJECTION, EMULSION INTRAVENOUS at 08:19

## 2020-08-21 RX ADMIN — Medication 10 ML: at 20:33

## 2020-08-21 RX ADMIN — CARVEDILOL 3.12 MG: 3.12 TABLET, FILM COATED ORAL at 10:38

## 2020-08-21 RX ADMIN — CARVEDILOL 3.12 MG: 3.12 TABLET, FILM COATED ORAL at 16:49

## 2020-08-21 RX ADMIN — IPRATROPIUM BROMIDE AND ALBUTEROL 1 PUFF: 20; 100 SPRAY, METERED RESPIRATORY (INHALATION) at 14:25

## 2020-08-21 ASSESSMENT — PULMONARY FUNCTION TESTS
PIF_VALUE: 0

## 2020-08-21 ASSESSMENT — PAIN SCALES - GENERAL
PAINLEVEL_OUTOF10: 0

## 2020-08-21 ASSESSMENT — PAIN DESCRIPTION - PAIN TYPE: TYPE: SURGICAL PAIN

## 2020-08-21 NOTE — PROGRESS NOTES
Caden Rivas M.D.,[facility]  Corey Romero D.O., F.A.C.O.I., Shaun Dinh M.D. Rocael Hearn M.D., Janae Ramirez M.D. Omer Hurd D.O. Daily Pulmonary Progress Note    Patient:  Arinrsolomon Ferguson 77 y.o. female MRN: [id number]     Date of Service: 8/21/2020      Synopsis     We are following patient for severe tracheomalacia    \"CC\" ; dyspnea    Code status: Full code      Subjective      Patient was seen and examined. Patient doing well after bronchoscopy. Review of Systems:  Constitutional: Denies fever, weight loss, night sweats, and fatigue  Skin: Denies pigmentation, dark lesions, and rashes   HEENT: Denies hearing loss, tinnitus, ear drainage, epistaxis, sore throat, and hoarseness. Cardiovascular: Denies palpitations, chest pain, and chest pressure. Respiratory: Denies cough, dyspnea at rest, hemoptysis, apnea, and choking.   Gastrointestinal: Denies nausea, vomiting, poor appetite, diarrhea, heartburn or reflux  Genitourinary: Denies dysuria, frequency, urgency or hematuria  Musculoskeletal: Denies myalgias, muscle weakness, and bone pain  Neurological: Denies dizziness, vertigo, headache, and focal weakness  Psychological: Denies anxiety and depression  Endocrine: Denies heat intolerance and cold intolerance  Hematopoietic/Lymphatic: Denies bleeding problems and blood transfusions    24-hour events:      Objective   Vitals: BP (!) 165/85   Pulse 95   Temp 96.8 °F (36 °C) (Tympanic)   Resp 14   Ht 4' 11\" (1.499 m)   Wt 192 lb (87.1 kg)   SpO2 95%   BMI 38.78 kg/m²     I/O:    Intake/Output Summary (Last 24 hours) at 8/21/2020 0841  Last data filed at 8/21/2020 0826  Gross per 24 hour   Intake 460 ml   Output --   Net 460 ml       Vent Information  SpO2: 95 %                CURRENT MEDS :  Scheduled Meds:   enoxaparin  40 mg Subcutaneous Daily    pantoprazole  40 mg Oral QAM AC    levothyroxine  100 mcg Oral Daily    carvedilol  3.125 mg Oral BID WC  isosorbide mononitrate  30 mg Oral Daily    sertraline  50 mg Oral Daily    atorvastatin  20 mg Oral Nightly    aspirin  81 mg Oral Daily    mesalamine  1.2 g Oral Daily with breakfast       Physical Exam:  General Appearance: appears comfortable in no acute distress. HEENT: Normocephalic atraumatic without obvious abnormality   Neck: Lips, mucosa, and tongue normal.  Supple, symmetrical, trachea midline, no adenopathy;thyroid:  no enlargement/tenderness/nodules or JVD. Lung: Breath sounds CTA. Respirations   unlabored. Symmetrical expansion. Heart: RRR, normal S1, S2. No MRG  Abdomen: Soft, NT, ND. BS present x 4 quadrants. No bruit or organomegaly. Extremities: Pedal pulses 2+ symmetric b/l. Extremities normal, no cyanosis, clubbing, or edema. Musculokeletal: No joint swelling, no muscle tenderness. ROM normal in all joints of extremities. Neurologic: Mental status: Alert and Oriented X3 . Pertinent/ New Labs and Imaging Studies     I      Labs:  Lab Results   Component Value Date    WBC 5.9 08/20/2020    HGB 10.0 08/20/2020    HCT 32.5 08/20/2020    MCV 83.5 08/20/2020    MCH 25.7 08/20/2020    MCHC 30.8 08/20/2020    RDW 14.0 08/20/2020     08/20/2020    MPV 11.7 08/20/2020     Lab Results   Component Value Date     08/20/2020    K 3.9 08/20/2020    K 3.7 08/19/2020     08/20/2020    CO2 27 08/20/2020    BUN 9 08/20/2020    CREATININE 0.6 08/20/2020    LABALBU 3.9 08/20/2020    LABALBU 4.5 04/22/2011    CALCIUM 9.2 08/20/2020    GFRAA >60 08/20/2020    LABGLOM >60 08/20/2020     Lab Results   Component Value Date    PROTIME 11.1 06/18/2020    PROTIME 11.9 04/22/2011    INR 1.0 06/18/2020     Recent Labs     08/19/20  1701   PROBNP 1,424*     No results for input(s): PROCAL in the last 72 hours. This SmartLink has not been configured with any valid records. Micro:  No results for input(s): CULTRESP in the last 72 hours.   No results for input(s): LABGRAM in the last

## 2020-08-21 NOTE — PROCEDURES
Bronchoscopy Inpatient Procedure Note    Date of Procedure: 8/21/2020    Pre-op Diagnosis: Severe tracheomalacia    Post-op Diagnosis: Severe diffuse hyper dynamic tracheomalacia    Bronchoscopist: Yunior Khan      Anesthesia: Please see CRNA note   procedure: Flexible fiberoptic bronchoscopy    Estimated Blood Loss: None    Complications: None    Indications and History      (Please see today's progress notes for the latest issues,  physical exam and lab data)    Consent to Procedure  The risks, benefits, complications, treatment options and expected outcomes were discussed with the patient and/or POA  The possibilities of reaction to medication, pulmonary aspiration, perforation of a viscus, bleeding, failure to diagnose a condition and creating a complication requiring transfusion or operation were discussed with the patient who freely signed the consent. Description of Procedure  The patient was intubated on mechanical ventilation and placed on 100% oxygen. Samuel Amato was monitored by the Critical Nursing and Respiratory therapy staff and the standard ICU monitoring devices. Jerrol Sessions and the procedure were verified as Flexible Fiberoptic Bronchoscopy. A Time Out was held and the above information confirmed. The patient was placed in the appropriate position. The patient was sedated using propofol intravenously    The bronchoscope was then passed into the trachea via the ET tube. Lidocaine 2% solution 2 ml at a time was applied topically to the gloria. After careful inspection of the tracheal, the bronchoscope was sequentially passed into all segments of the left and right endobronchial trees to the second and/or third divisions.     Endobronchial findings    Vocal Cords Normal  Trachea: Severe diffuse most involving the entire trachea dynamic tracheomalacia with the airway obstructing almost 80% with expiration  Gloria  Normal mucosa    Right Main Stem Bronchus  Normal mucosa, easily collapsible airways  Right Upper Lobe Bronchi Normal mucosa, easily collapsible airways  Right Middle Lobe Bronchi  Normal mucosa, easily collapsible airways  Right Lower Lobe Bronchi (including the Superior segment) Normal mucosa,     Left Main Stem Bronchus Normal mucosa  Left Upper Lobe Bronchus, Upper Division Normal mucosa  Left Upper Lobe Bronchus, Lingula  Normal mucosa  Left Lower Lobe Bronchus (including the Superior segment)  Normal mucosa    Specimens Taken    None    Peggy Xie MD

## 2020-08-21 NOTE — PROGRESS NOTES
Admit Date: 8/19/2020    Subjective:     Feels fine no complaint     Objective:     No data found. I/O last 3 completed shifts: In: 120 [P.O.:120]  Out: -   No intake/output data recorded. HEENT: Normal  NECK: Thyroid normal. No carotid bruit. No lymphphadenopathy. CVS: RRR  RS: Clear. No wheeze. No rhonchi. Good airflow bilaterally. ABD: Soft. Non tender. No mass. Normal BS. EXT: No edema. Non tender. Pulses present.    NEURO:no focal deficit       Scheduled Meds:   enoxaparin  40 mg Subcutaneous Daily    pantoprazole  40 mg Oral QAM AC    levothyroxine  100 mcg Oral Daily    carvedilol  3.125 mg Oral BID WC    isosorbide mononitrate  30 mg Oral Daily    sertraline  50 mg Oral Daily    atorvastatin  20 mg Oral Nightly    aspirin  81 mg Oral Daily    mesalamine  1.2 g Oral Daily with breakfast     Continuous Infusions:    CBC with Differential:    Lab Results   Component Value Date    WBC 5.9 08/20/2020    RBC 3.89 08/20/2020    HGB 10.0 08/20/2020    HCT 32.5 08/20/2020     08/20/2020    MCV 83.5 08/20/2020    MCH 25.7 08/20/2020    MCHC 30.8 08/20/2020    RDW 14.0 08/20/2020    SEGSPCT 91 04/22/2011    LYMPHOPCT 11.4 08/20/2020    MONOPCT 7.6 08/20/2020    BASOPCT 0.3 08/20/2020    MONOSABS 0.45 08/20/2020    LYMPHSABS 0.67 08/20/2020    EOSABS 0.12 08/20/2020    BASOSABS 0.02 08/20/2020     CMP:    Lab Results   Component Value Date     08/20/2020    K 3.9 08/20/2020    K 3.7 08/19/2020     08/20/2020    CO2 27 08/20/2020    BUN 9 08/20/2020    CREATININE 0.6 08/20/2020    GFRAA >60 08/20/2020    LABGLOM >60 08/20/2020    PROT 7.4 08/20/2020    LABALBU 3.9 08/20/2020    LABALBU 4.5 04/22/2011    CALCIUM 9.2 08/20/2020    BILITOT 0.4 08/20/2020    ALKPHOS 86 08/20/2020    AST 22 08/20/2020    ALT 17 08/20/2020     PT/INR:    Lab Results   Component Value Date    PROTIME 11.1 06/18/2020    PROTIME 11.9 04/22/2011    INR 1.0 06/18/2020       Assessment:     Active Problems:

## 2020-08-21 NOTE — PROGRESS NOTES
PFT ordered, will need covid testing. Patient went from endo to currently PACU. Room number will be 8209b. Unable to do PFT will try later after COVID RESULTS.

## 2020-08-21 NOTE — H&P
called she was taken to the hospital.  Denies any recent sick contacts, denies fever chills or night sweats. I did ask her why she did not complete sleep titration study she reports that she feels like she did not need this CPAP machine. I did discuss with her Dr. Sumaya Gonzalez note regarding her risk factors and even with her mild ROSALVA needing to complete titration study. He works at [employer], but does report that she has to walk the entire store at least 3 times a day while doing her job. Past Medical History:   Diagnosis Date    Arthritis     CAD (coronary artery disease)     Cerebrovascular disease     Chronic back pain     Congenital heart disease     CVA (cerebral vascular accident) ([facility] [address] 9/3903    Embolic. ? tia?  Depression     Fracture of right shoulder 1/2011    Frequent UTI     GERD (gastroesophageal reflux disease)     H/O ligation of vein 1974 Bilaterally.  Hiatal hernia 2000    History of thoracic outlet syndrome     Hypercholesterolemia     Hypertension     Hypothyroidism     Kidney stones     Nephrolithiasis     Bilaterally.  Nonischemic cardiomyopathy ([facility] [address] 6/25/2018    ROSALVA (obstructive sleep apnea) 04/16/2018    mild    Osteoarthritis     PFO (patent foramen ovale)     Pneumonia 1989    Thyroid disease     Hypothyroidism. Past Surgical History:   Procedure Laterality Date    CARDIAC CATHETERIZATION  03/05/2018    No significant CAD  (Dr Navdeep Garber)    COLONOSCOPY  11/04    Negative.  COLONOSCOPY  09/17/2013    COLONOSCOPY  12/30/2016    Sigmoid Colitis, Diverticulitis    DIAGNOSTIC CARDIAC CATH LAB PROCEDURE  5/17/1988    [facility], patent coronaries.  DIAGNOSTIC CARDIAC CATH LAB PROCEDURE  6/10/1992    [facility], patent coronaries.  DIAGNOSTIC CARDIAC CATH LAB PROCEDURE  10/26/2001    [facility], Normal cath, right femoral angiography and Perclose.     DIAGNOSTIC CARDIAC CATH LAB PROCEDURE  12/7/2005    [facility] [address] LITHOTRIPSY      MIDDLE EAR SURGERY      Eardrum replaced in right ear.  TONSILLECTOMY      URETHRA SURGERY      VASCULAR SURGERY      veil ligation kylee legs       Family History   Problem Relation Age of Onset    Coronary Art Dis Mother         s/p CABG    Alzheimer's Disease Mother     Heart Disease Brother 72        ACB       Social History:   Social History     Socioeconomic History    Marital status:       Spouse name: Not on file    Number of children: Not on file    Years of education: Not on file    Highest education level: Not on file   Occupational History    Not on file   Social Needs    Financial resource strain: Not on file    Food insecurity     Worry: Not on file     Inability: Not on file    Transportation needs     Medical: Not on file     Non-medical: Not on file   Tobacco Use    Smoking status: Former Smoker     Packs/day: 3.00     Years: 24.00     Pack years: 72.00     Types: Cigarettes     Start date:      Last attempt to quit: 1999     Years since quittin.6    Smokeless tobacco: Never Used    Tobacco comment: quit 17 yerars ago   Substance and Sexual Activity    Alcohol use: No     Alcohol/week: 0.0 standard drinks     Comment: denies caffeine    Drug use: No    Sexual activity: Not on file   Lifestyle    Physical activity     Days per week: Not on file     Minutes per session: Not on file    Stress: Not on file   Relationships    Social connections     Talks on phone: Not on file     Gets together: Not on file     Attends Judaism service: Not on file     Active member of club or organization: Not on file     Attends meetings of clubs or organizations: Not on file     Relationship status: Not on file    Intimate partner violence     Fear of current or ex partner: Not on file     Emotionally abused: Not on file     Physically abused: Not on file     Forced sexual activity: Not on file   Other Topics Concern    Not on file   Social History Narrative    Not on file     Smoking history: The patient is a former smoker quit in [address] [place] [address] with 72 pack years history of smoking  ETOH:   reports no history of alcohol use. Exposures: There  is not history of TB or TB exposure. There is not asbestos or silica dust exposure. The patient reports does not have coal, foundry, quarry or [employer] exposure. Recent travel history [place]. There is not  history of recreational or IV drug use. There is not hot tub exposure. The patient does not have any exotic pets, turtles or exotic birds. Vaccines:     POLYSACCHARIDE:[id number]}  Immunization History   Administered Date(s) Administered    Influenza Vaccine, unspecified formulation 10/10/2017    Influenza Virus Vaccine 10/10/2017    Influenza, High Dose (Fluzone 65 yrs and older) 09/12/2017, 09/23/2019    Pneumococcal Polysaccharide ([id number]) 11/11/2019    Zoster Recombinant (Shingrix) 03/10/2019        Home Meds: Medications Prior to Admission: carvedilol (COREG) 3.125 MG tablet, Take 1 tablet by mouth 2 times daily (with meals)  fluticasone (FLONASE) 50 MCG/ACT nasal spray, 2 sprays by Nasal route every morning  levothyroxine (SYNTHROID) 100 MCG tablet, Take 1 tablet by mouth Daily  omeprazole (PRILOSEC) 20 MG delayed release capsule, Take 1 capsule by mouth 2 times daily  sertraline (ZOLOFT) 50 MG tablet, Take 1 tablet by mouth daily (Patient taking differently: Take 50 mg by mouth nightly )  simvastatin (ZOCOR) 20 MG tablet, Take 1 tablet by mouth nightly  sucralfate (CARAFATE) 1 GM tablet, Take 1 tablet by mouth 2 times daily Indications: pt had on 6/2/20  mesalamine (LIALDA) 1.2 g EC tablet, TAKE 1 TABLET BY MOUTH DAILY  nitroGLYCERIN (NITROSTAT) 0.4 MG SL tablet, Place 1 tablet under the tongue every 5 minutes as needed for Chest pain  aspirin 81 MG EC tablet, Take 1 tablet by mouth daily  ascorbic acid (VITAMIN C) 500 MG tablet, Take 500 mg by mouth daily.   multivitamin-iron-minerals-folic acid (CENTRUM) chewable tablet, Take 1 tablet by mouth daily. isosorbide mononitrate (IMDUR) 30 MG extended release tablet, Take 1 tablet by mouth daily    CURRENT MEDS :  Scheduled Meds:   enoxaparin  40 mg Subcutaneous Daily    pantoprazole  40 mg Oral QAM AC    levothyroxine  100 mcg Oral Daily    carvedilol  3.125 mg Oral BID WC    isosorbide mononitrate  30 mg Oral Daily    sertraline  50 mg Oral Daily    atorvastatin  20 mg Oral Nightly    aspirin  81 mg Oral Daily    mesalamine  1.2 g Oral Daily with breakfast       Continuous Infusions: Allergies   Allergen Reactions    Biaxin [Clarithromycin] Nausea And Vomiting and Other (See Comments)     Abdominal pain    Codeine Nausea And Vomiting    Neosporin [Bacitracin-Neomycin-Polymyxin] Swelling    Sulfa Antibiotics Itching and Nausea And Vomiting     decrease in Blood pressure.  Clarithromycin Other (See Comments)    Neomycin-Polymyxin-Gramicidin Swelling     redness    Septra [Sulfamethoxazole-Trimethoprim]     Vioxx [Rofecoxib]     Sulfasalazine Nausea And Vomiting     severe headaches. REVIEW OF SYSTEMS:  Constitutional: Denies fever, weight loss, night sweats, and fatigue  Skin: Denies pigmentation, dark lesions, and rashes   HEENT: Denies hearing loss, tinnitus, ear drainage, epistaxis, sore throat, and hoarseness. Cardiovascular: Denies palpitations, chest pain, and chest pressure. Respiratory: Denies cough, dyspnea at rest, hemoptysis, apnea, and choking.   Gastrointestinal: Denies nausea, vomiting, poor appetite, diarrhea, heartburn or reflux  Genitourinary: Denies dysuria, frequency, urgency or hematuria  Musculoskeletal: Denies myalgias, muscle weakness, and bone pain  Neurological: Denies dizziness, vertigo, headache, and focal weakness  Psychological: Denies anxiety and depression  Endocrine: Denies heat intolerance and cold intolerance  Hematopoietic/Lymphatic: Denies bleeding problems and blood 08/20/2020     08/20/2020    MPV 11.7 08/20/2020     Lab Results   Component Value Date     08/20/2020    K 3.9 08/20/2020    K 3.7 08/19/2020     08/20/2020    CO2 27 08/20/2020    BUN 9 08/20/2020    CREATININE 0.6 08/20/2020    LABALBU 3.9 08/20/2020    LABALBU 4.5 04/22/2011    CALCIUM 9.2 08/20/2020    GFRAA >60 08/20/2020    LABGLOM >60 08/20/2020     Lab Results   Component Value Date    PROTIME 11.1 06/18/2020    PROTIME 11.9 04/22/2011    INR 1.0 06/18/2020     Recent Labs     08/19/20  1701   PROBNP 1,424*     Recent Labs     08/19/20  1701 08/20/20  0308 08/20/20  0940   TROPONINI <0.01 <0.01 <0.01     No results for input(s): PROCAL in the last 72 hours. This SmartLink has not been configured with any valid records. Micro:  No results for input(s): CULTRESP in the last 72 hours. No results for input(s): LABGRAM in the last 72 hours. No results for input(s): LEGUR in the last 72 hours. No results for input(s): STREPNEUMAGU in the last 72 hours. No results for input(s): LP1UAG in the last 72 hours. Assessment:  1. Severe tracheomalacia  1. Shortness of breath  2. Chest pain  3. ROSALVA not treated with CPAP,  patient did not complete titration study as recommended  4. Nicotine dependence in remission  5. Obesity  6. History PFO    Plan:  1.  We will proceed with a diagnostic bronchoscopy          Electronically signed by Fani Car MD on 8/21/2020 at 8:28 AM

## 2020-08-21 NOTE — ANESTHESIA POSTPROCEDURE EVALUATION
Department of Anesthesiology  Postprocedure Note    Patient: Yan Jamil  MRN: [id number]  YOB: 1950  Date of evaluation: 8/21/2020  Time:  9:59 AM     Procedure Summary     Date:  08/21/20 Room / Location:  [facility] 03 / [facility]    Anesthesia Start:  4656 Anesthesia Stop:  0830    Procedure:  BRONCHOSCOPY (N/A ) Diagnosis:  (/)    Surgeon:  Stephen Leslie MD Responsible Provider:  Corrinne Brunet, MD    Anesthesia Type:  MAC ASA Status:  3          Anesthesia Type: MAC    Alireza Phase I: Alireza Score: 9    Alireza Phase II:      Last vitals: Reviewed and per EMR flowsheets.        Anesthesia Post Evaluation    Patient location during evaluation: PACU  Patient participation: complete - patient participated  Level of consciousness: awake and alert  Pain score: 0  Airway patency: patent  Nausea & Vomiting: no vomiting and no nausea  Complications: no  Cardiovascular status: hemodynamically stable  Respiratory status: spontaneous ventilation  Hydration status: stable

## 2020-08-21 NOTE — PLAN OF CARE
Problem: Cardiac:  Goal: Ability to maintain an adequate cardiac output will improve  Description: Ability to maintain an adequate cardiac output will improve  Outcome: Met This Shift  Goal: Hemodynamic stability will improve  Description: Hemodynamic stability will improve  Outcome: Met This Shift     Problem: Respiratory:  Goal: Respiratory status will improve  Description: Respiratory status will improve  Outcome: Met This Shift

## 2020-08-22 PROCEDURE — 6370000000 HC RX 637 (ALT 250 FOR IP): Performed by: FAMILY MEDICINE

## 2020-08-22 PROCEDURE — 2580000003 HC RX 258: Performed by: RADIOLOGY

## 2020-08-22 PROCEDURE — 2140000000 HC CCU INTERMEDIATE R&B

## 2020-08-22 PROCEDURE — 6360000002 HC RX W HCPCS: Performed by: FAMILY MEDICINE

## 2020-08-22 PROCEDURE — 94660 CPAP INITIATION&MGMT: CPT

## 2020-08-22 RX ADMIN — ISOSORBIDE MONONITRATE 30 MG: 30 TABLET ORAL at 09:10

## 2020-08-22 RX ADMIN — IPRATROPIUM BROMIDE AND ALBUTEROL 1 PUFF: 20; 100 SPRAY, METERED RESPIRATORY (INHALATION) at 20:36

## 2020-08-22 RX ADMIN — ENOXAPARIN SODIUM 40 MG: 40 INJECTION SUBCUTANEOUS at 09:09

## 2020-08-22 RX ADMIN — IPRATROPIUM BROMIDE AND ALBUTEROL 1 PUFF: 20; 100 SPRAY, METERED RESPIRATORY (INHALATION) at 13:54

## 2020-08-22 RX ADMIN — Medication 10 ML: at 20:25

## 2020-08-22 RX ADMIN — LEVOTHYROXINE SODIUM 100 MCG: 0.1 TABLET ORAL at 06:30

## 2020-08-22 RX ADMIN — CARVEDILOL 3.12 MG: 3.12 TABLET, FILM COATED ORAL at 09:10

## 2020-08-22 RX ADMIN — Medication 10 ML: at 09:10

## 2020-08-22 RX ADMIN — SERTRALINE 50 MG: 50 TABLET, FILM COATED ORAL at 09:10

## 2020-08-22 RX ADMIN — PANTOPRAZOLE SODIUM 40 MG: 40 TABLET, DELAYED RELEASE ORAL at 06:30

## 2020-08-22 RX ADMIN — ASPIRIN 81 MG: 81 TABLET, CHEWABLE ORAL at 09:10

## 2020-08-22 RX ADMIN — CARVEDILOL 3.12 MG: 3.12 TABLET, FILM COATED ORAL at 18:21

## 2020-08-22 RX ADMIN — IPRATROPIUM BROMIDE AND ALBUTEROL 1 PUFF: 20; 100 SPRAY, METERED RESPIRATORY (INHALATION) at 09:09

## 2020-08-22 RX ADMIN — ATORVASTATIN CALCIUM 20 MG: 20 TABLET, FILM COATED ORAL at 20:25

## 2020-08-22 ASSESSMENT — PAIN SCALES - GENERAL
PAINLEVEL_OUTOF10: 0
PAINLEVEL_OUTOF10: 0

## 2020-08-22 NOTE — PROGRESS NOTES
Admit Date: 8/19/2020    Subjective:     Feels fine This Am, had bronch. Yesterday     Objective:     Patient Vitals for the past 8 hrs:   BP Temp Temp src Pulse Resp   08/22/20 0845 120/65 96.9 °F (36.1 °C) Temporal 77 18     I/O last 3 completed shifts: In: 500 [P.O.:400; I.V.:100]  Out: -   I/O this shift: In: 480 [P.O.:480]  Out: -     HEENT: Normal  NECK: Thyroid normal. No carotid bruit. No lymphphadenopathy. CVS: RRR  RS: Clear. No wheeze. No rhonchi. Good airflow bilaterally. ABD: Soft. Non tender. No mass. Normal BS.obese   EXT: No edema. Non tender. Pulses present.    NEURO: Intact      Scheduled Meds:   albuterol-ipratropium  1 puff Inhalation Q6H    enoxaparin  40 mg Subcutaneous Daily    pantoprazole  40 mg Oral QAM AC    levothyroxine  100 mcg Oral Daily    carvedilol  3.125 mg Oral BID WC    isosorbide mononitrate  30 mg Oral Daily    sertraline  50 mg Oral Daily    atorvastatin  20 mg Oral Nightly    aspirin  81 mg Oral Daily    mesalamine  1.2 g Oral Daily with breakfast     Continuous Infusions:    CBC with Differential:    Lab Results   Component Value Date    WBC 5.9 08/20/2020    RBC 3.89 08/20/2020    HGB 10.0 08/20/2020    HCT 32.5 08/20/2020     08/20/2020    MCV 83.5 08/20/2020    MCH 25.7 08/20/2020    MCHC 30.8 08/20/2020    RDW 14.0 08/20/2020    SEGSPCT 91 04/22/2011    LYMPHOPCT 11.4 08/20/2020    MONOPCT 7.6 08/20/2020    BASOPCT 0.3 08/20/2020    MONOSABS 0.45 08/20/2020    LYMPHSABS 0.67 08/20/2020    EOSABS 0.12 08/20/2020    BASOSABS 0.02 08/20/2020     CMP:    Lab Results   Component Value Date     08/20/2020    K 3.9 08/20/2020    K 3.7 08/19/2020     08/20/2020    CO2 27 08/20/2020    BUN 9 08/20/2020    CREATININE 0.6 08/20/2020    GFRAA >60 08/20/2020    LABGLOM >60 08/20/2020    PROT 7.4 08/20/2020    LABALBU 3.9 08/20/2020    LABALBU 4.5 04/22/2011    CALCIUM 9.2 08/20/2020    BILITOT 0.4 08/20/2020    ALKPHOS 86 08/20/2020    AST 22 08/20/2020    ALT 17 08/20/2020     PT/INR:    Lab Results   Component Value Date    PROTIME 11.1 06/18/2020    PROTIME 11.9 04/22/2011    INR 1.0 06/18/2020       Assessment:     Active Problems:    Tracheomalacia    HTN    Obesity    ROSALVA      Plan:   As per pulmonary

## 2020-08-22 NOTE — PLAN OF CARE
Problem: Cardiac:  Goal: Ability to maintain an adequate cardiac output will improve  Description: Ability to maintain an adequate cardiac output will improve  Outcome: Met This Shift  Goal: Hemodynamic stability will improve  Description: Hemodynamic stability will improve  Outcome: Met This Shift     Problem: Respiratory:  Goal: Respiratory status will improve  Description: Respiratory status will improve  Outcome: Met This Shift     Problem: Falls - Risk of:  Goal: Will remain free from falls  Description: Will remain free from falls  Outcome: Met This Shift  Goal: Absence of physical injury  Description: Absence of physical injury  Outcome: Met This Shift

## 2020-08-22 NOTE — PROGRESS NOTES
Kaleb Santos M.D.,[facility]  Jewell Roque D.O., F.A.C.O.I., Hollie Ibanez M.D. Herminia Padgett M.D., Leda Young M.D. Mary Bragg D.O. Daily Pulmonary Progress Note    Patient:  Yan Jamil 77 y.o. female MRN: [id number]     Date of Service: 8/22/2020      Synopsis     We are following patient for severe tracheomalacia    \"CC\" ; dyspnea    Code status: Full code      Subjective      Patient was seen and examined sitting up on the side of the bed. She said she wore the CPAP last night and slept very well. She is afraid to go home without PAP therapy. She will need at least a titration study. She does snore, has witnessed apneic events, is tired on waking, has daytime sleepiness, takes naps, has nocturia and chest palpitations. Ordered an outpatient sleep study. Review of Systems:  Constitutional: Denies fever, weight loss, night sweats, and fatigue  Skin: Denies pigmentation, dark lesions, and rashes   HEENT: Denies hearing loss, tinnitus, ear drainage, epistaxis, sore throat, and hoarseness. Cardiovascular: Denies palpitations, chest pain, and chest pressure. Respiratory: Denies cough, dyspnea at rest, hemoptysis, apnea, and choking. Gastrointestinal: Denies nausea, vomiting, poor appetite, diarrhea, heartburn or reflux  Genitourinary: Denies dysuria, frequency, urgency or hematuria  Musculoskeletal: Denies myalgias, muscle weakness, and bone pain  Neurological: Denies dizziness, vertigo, headache, and focal weakness  Psychological: Denies anxiety and depression  Endocrine: Denies heat intolerance and cold intolerance  Hematopoietic/Lymphatic: Denies bleeding problems and blood transfusions    24-hour events:  PFTs  IMPRESSION:  1. Severe restrictive lung disease. 2.  Good bronchodilator response. 3.  Moderate decrement in total lung capacity. 4.  Severe decrement in diffusion capacity, which normalizes when  corrected for alveolar volume.     Objective narrowing of the    tracheal airway in the upper chest and critical narrowing in the    mainstem bronchi, more prominently on the right.         Negative for evidence of pulmonary embolism, organized pneumonia, or    pleural effusion.         There is some peripheral atelectasis in the upper and lower lungs    bilaterally. .      Labs:  Lab Results   Component Value Date    WBC 5.9 08/20/2020    HGB 10.0 08/20/2020    HCT 32.5 08/20/2020    MCV 83.5 08/20/2020    MCH 25.7 08/20/2020    MCHC 30.8 08/20/2020    RDW 14.0 08/20/2020     08/20/2020    MPV 11.7 08/20/2020     Lab Results   Component Value Date     08/20/2020    K 3.9 08/20/2020    K 3.7 08/19/2020     08/20/2020    CO2 27 08/20/2020    BUN 9 08/20/2020    CREATININE 0.6 08/20/2020    LABALBU 3.9 08/20/2020    LABALBU 4.5 04/22/2011    CALCIUM 9.2 08/20/2020    GFRAA >60 08/20/2020    LABGLOM >60 08/20/2020     Lab Results   Component Value Date    PROTIME 11.1 06/18/2020    PROTIME 11.9 04/22/2011    INR 1.0 06/18/2020     Recent Labs     08/19/20  1701   PROBNP 1,424*     No results for input(s): PROCAL in the last 72 hours. This SmartLink has not been configured with any valid records. Micro:  No results for input(s): CULTRESP in the last 72 hours. No results for input(s): LABGRAM in the last 72 hours. No results for input(s): LEGUR in the last 72 hours. No results for input(s): STREPNEUMAGU in the last 72 hours. No results for input(s): LP1UAG in the last 72 hours. Assessment:    1. Please see bronch report patient has severe tracheomalacia. 2. History of ROSALVA  3. Morbid obesity  4. PFO      Plan:   1. Considering the extent of her disease I think she will probably benefit from Pap therapy. We will arrange for her to have a in lab CPAP titration specially that she desaturates easily in supine position. Ordered.    2. Considering her extensive history of smoking although she quit over 10 years ago we will go ahead and

## 2020-08-22 NOTE — PROGRESS NOTES
Date: 8/21/2020    Time: 9:23 PM    Patient Placed On BIPAP/CPAP/ Non-Invasive Ventilation? Yes    If no must comment. Facial area red/color change? No           If YES are Blister/Lesion present? No   If yes must notify nursing staff  BIPAP/CPAP skin barrier?   Yes    Skin barrier type:duoderm       Comments:placed patient on CPAP at this time        Geo Shafer

## 2020-08-22 NOTE — PROCEDURES
[address]                  [address] [facility],  [facility]                               PULMONARY FUNCTION    PATIENT NAME: Brandy Hawkins                 :        1950  MED REC NO:   [id number]                            ROOM:       8209  ACCOUNT NO:   [de-identified]                           ADMIT DATE: 2020  PROVIDER:     Nya Rao MD    DATE OF PROCEDURE:  2020    PULMONARY FUNCTION TEST    FINDINGS:  As follows:  FEV1 to FVC was 86. FEV1 was 0.9 liters, 47% of  predicted. FVC was 1.04 liters, which was 43% of predicted. There was  no bronchodilator response. Total lung capacity was 2.93 liters, which  was 68% of predicted. Diffusion capacity was 6.15 liters, which was 35%  of predicted. IMPRESSION:  1. Severe restrictive lung disease. 2.  Good bronchodilator response. 3.  Moderate decrement in total lung capacity. 4.  Severe decrement in diffusion capacity, which normalizes when  corrected for alveolar volume.         Prem Wright MD    D: 2020 14:04:45       T: 2020 14:14:07     [email or web address]  Job#: [id number]     Doc#: [id number]    CC:

## 2020-08-23 PROCEDURE — 2140000000 HC CCU INTERMEDIATE R&B

## 2020-08-23 PROCEDURE — 94660 CPAP INITIATION&MGMT: CPT

## 2020-08-23 PROCEDURE — 2580000003 HC RX 258: Performed by: RADIOLOGY

## 2020-08-23 PROCEDURE — 6370000000 HC RX 637 (ALT 250 FOR IP): Performed by: FAMILY MEDICINE

## 2020-08-23 RX ADMIN — LEVOTHYROXINE SODIUM 100 MCG: 0.1 TABLET ORAL at 05:51

## 2020-08-23 RX ADMIN — CARVEDILOL 3.12 MG: 3.12 TABLET, FILM COATED ORAL at 08:55

## 2020-08-23 RX ADMIN — PANTOPRAZOLE SODIUM 40 MG: 40 TABLET, DELAYED RELEASE ORAL at 05:51

## 2020-08-23 RX ADMIN — Medication 10 ML: at 21:21

## 2020-08-23 RX ADMIN — ATORVASTATIN CALCIUM 20 MG: 20 TABLET, FILM COATED ORAL at 21:20

## 2020-08-23 RX ADMIN — IPRATROPIUM BROMIDE AND ALBUTEROL 1 PUFF: 20; 100 SPRAY, METERED RESPIRATORY (INHALATION) at 21:20

## 2020-08-23 RX ADMIN — ASPIRIN 81 MG: 81 TABLET, CHEWABLE ORAL at 08:55

## 2020-08-23 RX ADMIN — ISOSORBIDE MONONITRATE 30 MG: 30 TABLET ORAL at 08:55

## 2020-08-23 RX ADMIN — SERTRALINE 50 MG: 50 TABLET, FILM COATED ORAL at 08:55

## 2020-08-23 RX ADMIN — IPRATROPIUM BROMIDE AND ALBUTEROL 1 PUFF: 20; 100 SPRAY, METERED RESPIRATORY (INHALATION) at 08:56

## 2020-08-23 RX ADMIN — Medication 10 ML: at 08:55

## 2020-08-23 ASSESSMENT — PAIN SCALES - GENERAL
PAINLEVEL_OUTOF10: 0
PAINLEVEL_OUTOF10: 0

## 2020-08-23 NOTE — PROGRESS NOTES
Admit Date: 8/19/2020    Subjective:     Feels good     Objective:     Patient Vitals for the past 8 hrs:   BP Temp Temp src Pulse Resp SpO2   08/23/20 0845 120/69 97.8 °F (36.6 °C) Temporal 84 18 95 %     I/O last 3 completed shifts: In: 480 [P.O.:480]  Out: -   No intake/output data recorded. HEENT: Normal  NECK: Thyroid normal. No carotid bruit. No lymphphadenopathy. CVS: RRR  RS: Clear. No wheeze. No rhonchi. Good airflow bilaterally. ABD: Soft. Non tender. No mass. Normal BS.obese   EXT: No edema. Non tender. Pulses present. Skin intact.   NEURO: Intact      Scheduled Meds:   albuterol-ipratropium  1 puff Inhalation Q6H    enoxaparin  40 mg Subcutaneous Daily    pantoprazole  40 mg Oral QAM AC    levothyroxine  100 mcg Oral Daily    carvedilol  3.125 mg Oral BID WC    isosorbide mononitrate  30 mg Oral Daily    sertraline  50 mg Oral Daily    atorvastatin  20 mg Oral Nightly    aspirin  81 mg Oral Daily    mesalamine  1.2 g Oral Daily with breakfast     Continuous Infusions:    CBC with Differential:    Lab Results   Component Value Date    WBC 5.9 08/20/2020    RBC 3.89 08/20/2020    HGB 10.0 08/20/2020    HCT 32.5 08/20/2020     08/20/2020    MCV 83.5 08/20/2020    MCH 25.7 08/20/2020    MCHC 30.8 08/20/2020    RDW 14.0 08/20/2020    SEGSPCT 91 04/22/2011    LYMPHOPCT 11.4 08/20/2020    MONOPCT 7.6 08/20/2020    BASOPCT 0.3 08/20/2020    MONOSABS 0.45 08/20/2020    LYMPHSABS 0.67 08/20/2020    EOSABS 0.12 08/20/2020    BASOSABS 0.02 08/20/2020     CMP:    Lab Results   Component Value Date     08/20/2020    K 3.9 08/20/2020    K 3.7 08/19/2020     08/20/2020    CO2 27 08/20/2020    BUN 9 08/20/2020    CREATININE 0.6 08/20/2020    GFRAA >60 08/20/2020    LABGLOM >60 08/20/2020    PROT 7.4 08/20/2020    LABALBU 3.9 08/20/2020    LABALBU 4.5 04/22/2011    CALCIUM 9.2 08/20/2020    BILITOT 0.4 08/20/2020    ALKPHOS 86 08/20/2020    AST 22 08/20/2020    ALT 17 08/20/2020 PT/INR:    Lab Results   Component Value Date    PROTIME 11.1 06/18/2020    PROTIME 11.9 04/22/2011    INR 1.0 06/18/2020       Assessment:     Active Problems:   tracheomalacia     HTN    ROSALVA    Obesity       Plan:   Home per pulmonary

## 2020-08-23 NOTE — PLAN OF CARE
Problem: Respiratory:  Goal: Respiratory status will improve  Description: Respiratory status will improve  Outcome: Met This Shift     Problem: Falls - Risk of:  Goal: Will remain free from falls  Description: Will remain free from falls  Outcome: Met This Shift  Goal: Absence of physical injury  Description: Absence of physical injury  Outcome: Met This Shift

## 2020-08-23 NOTE — PROGRESS NOTES
Date: 8/22/2020    Time: 10:32 PM    Patient Placed On BIPAP/CPAP/ Non-Invasive Ventilation? Yes    If no must comment. Facial area red/color change? No           If YES are Blister/Lesion present? No   If yes must notify nursing staff  BIPAP/CPAP skin barrier?   Yes    Skin barrier type:duoderm       Comments:        Elliot Childs

## 2020-08-23 NOTE — PROGRESS NOTES
Date: 8/23/2020    Time: 12:29 AM    Patient Placed On BIPAP/CPAP/ Non-Invasive Ventilation? Patient continues on cpap    If no must comment. Facial area red/color change? No           If YES are Blister/Lesion present? No   If yes must notify nursing staff  BIPAP/CPAP skin barrier?   Yes    Skin barrier type:duoderm       Comments:        Monisha Moses, RRT

## 2020-08-24 VITALS
TEMPERATURE: 96.9 F | OXYGEN SATURATION: 96 % | DIASTOLIC BLOOD PRESSURE: 78 MMHG | HEART RATE: 72 BPM | RESPIRATION RATE: 16 BRPM | HEIGHT: 59 IN | BODY MASS INDEX: 38.71 KG/M2 | SYSTOLIC BLOOD PRESSURE: 124 MMHG | WEIGHT: 192 LBS

## 2020-08-24 PROCEDURE — 94660 CPAP INITIATION&MGMT: CPT

## 2020-08-24 PROCEDURE — 6370000000 HC RX 637 (ALT 250 FOR IP): Performed by: FAMILY MEDICINE

## 2020-08-24 RX ADMIN — ASPIRIN 81 MG: 81 TABLET, CHEWABLE ORAL at 08:06

## 2020-08-24 RX ADMIN — SERTRALINE 50 MG: 50 TABLET, FILM COATED ORAL at 08:06

## 2020-08-24 RX ADMIN — LEVOTHYROXINE SODIUM 100 MCG: 0.1 TABLET ORAL at 07:04

## 2020-08-24 RX ADMIN — ISOSORBIDE MONONITRATE 30 MG: 30 TABLET ORAL at 08:06

## 2020-08-24 RX ADMIN — IPRATROPIUM BROMIDE AND ALBUTEROL 1 PUFF: 20; 100 SPRAY, METERED RESPIRATORY (INHALATION) at 08:06

## 2020-08-24 RX ADMIN — PANTOPRAZOLE SODIUM 40 MG: 40 TABLET, DELAYED RELEASE ORAL at 07:04

## 2020-08-24 RX ADMIN — CARVEDILOL 3.12 MG: 3.12 TABLET, FILM COATED ORAL at 08:06

## 2020-08-24 ASSESSMENT — PAIN SCALES - GENERAL: PAINLEVEL_OUTOF10: 0

## 2020-08-24 NOTE — PROGRESS NOTES
Anabel Perry M.D.,[facility]  Lynnette Humphreys D.O., SUMIT, Aby Brito M.D. Shanda Fang M.D., Ascencion Mijares M.D. Joaquín Phillips D.O. Daily Pulmonary Progress Note    Patient:  Kisha Powell 77 y.o. female MRN: [id number]     Date of Service: 8/24/2020      Synopsis     We are following patient for severe tracheomalacia    \"CC\" ; dyspnea    Code status: Full code      Subjective      Patient was seen and examined sitting up on the side of the bed. She said she wore the CPAP last night and slept very well. Ordered an outpatient sleep study. Review of Systems:  Constitutional: Denies fever, weight loss, night sweats, and fatigue  Skin: Denies pigmentation, dark lesions, and rashes   HEENT: Denies hearing loss, tinnitus, ear drainage, epistaxis, sore throat, and hoarseness. Cardiovascular: Denies palpitations, chest pain, and chest pressure. Respiratory: Denies cough, dyspnea at rest, hemoptysis, apnea, and choking. Gastrointestinal: Denies nausea, vomiting, poor appetite, diarrhea, heartburn or reflux  Genitourinary: Denies dysuria, frequency, urgency or hematuria  Musculoskeletal: Denies myalgias, muscle weakness, and bone pain  Neurological: Denies dizziness, vertigo, headache, and focal weakness  Psychological: Denies anxiety and depression  Endocrine: Denies heat intolerance and cold intolerance  Hematopoietic/Lymphatic: Denies bleeding problems and blood transfusions    24-hour events:  PFTs  IMPRESSION:  1. Severe restrictive lung disease. 2.  Good bronchodilator response. 3.  Moderate decrement in total lung capacity. 4.  Severe decrement in diffusion capacity, which normalizes when  corrected for alveolar volume.     Objective   Vitals: /78   Pulse 72   Temp 96.9 °F (36.1 °C) (Temporal)   Resp 16   Ht 4' 11\" (1.499 m)   Wt 192 lb (87.1 kg)   SpO2 96%   BMI 38.78 kg/m²     I/O:    Intake/Output Summary (Last 24 hours) at 8/24/2020 1309  Last data filed at 8/24/2020 0630  Gross per 24 hour   Intake 730 ml   Output --   Net 730 ml       Vent Information  Skin Assessment: Clean, dry, & intact  FiO2 : 21 %  SpO2: 96 %  Mask Type: Full face mask  Mask Size: Small          CPAP/EPAP: 6 cmH2O     CURRENT MEDS :  Scheduled Meds:   albuterol-ipratropium  1 puff Inhalation Q6H    enoxaparin  40 mg Subcutaneous Daily    pantoprazole  40 mg Oral QAM AC    levothyroxine  100 mcg Oral Daily    carvedilol  3.125 mg Oral BID WC    isosorbide mononitrate  30 mg Oral Daily    sertraline  50 mg Oral Daily    atorvastatin  20 mg Oral Nightly    aspirin  81 mg Oral Daily    mesalamine  1.2 g Oral Daily with breakfast       Physical Exam:  General Appearance: appears comfortable in no acute distress. HEENT: Normocephalic atraumatic without obvious abnormality   Neck: Lips, mucosa, and tongue normal.  Supple, symmetrical, trachea midline, no adenopathy;thyroid:  no enlargement/tenderness/nodules or JVD. Lung: Breath sounds CTA. Respirations   unlabored. Symmetrical expansion. Heart: RRR, normal S1, S2. No MRG  Abdomen: Soft, NT, ND. BS present x 4 quadrants. No bruit or organomegaly. Extremities: Pedal pulses 2+ symmetric b/l. Extremities normal, no cyanosis, clubbing, or edema. Musculokeletal: No joint swelling, no muscle tenderness. ROM normal in all joints of extremities. Neurologic: Mental status: Alert and Oriented X3 . Pertinent/ New Labs and Imaging Studies   8/19 cxr  1. Enlarged cardiac silhouette. No evidence of overt failure. 2. Subtle left basilar opacity is likely an artifact of under    penetration. No definite consolidation is seen.       8/19  CTA chest  There is severe tracheomalacia with very prominent narrowing of the    tracheal airway in the upper chest and critical narrowing in the    mainstem bronchi, more prominently on the right.         Negative for evidence of pulmonary embolism, organized pneumonia, or to pulmonary rehab  5.  she needs to eat small portions of food more frequently throughout the day versus having large meals. 6. Can d/c from pulmonary pov , home on combivent  , routed to office       Electronically signed by IZZY Lion on 8/24/2020 at 1:09 PM    I personally saw, examined, and cared for the patient. Labs, medications, radiographs reviewed. I agree with history exam and plans detailed in NP note. Keila Calhoun for Kailee Delgado M.D.    Pulmonary/Critical Care Medicine

## 2020-08-24 NOTE — PROGRESS NOTES
11.1 06/18/2020    PROTIME 11.9 04/22/2011    INR 1.0 06/18/2020       Assessment:     Active Problems:    Tracheomalacia     HTN    Obesity    ROSALVA      Plan:   Home per pulmonary

## 2020-08-24 NOTE — CARE COORDINATION
8/24 CM Note: [place] Sleepiness Scale and neck measurements along with Sleep Study order faxed to [facility] Lab at fax [phone].  Vanna Vallejo RN  [phone]

## 2020-08-24 NOTE — PROGRESS NOTES
Date: 8/24/2020    Time: 2:32 AM      Date: 8/24/2020    Time: 2:32 AM    Patient Placed On BIPAP/CPAP/ Non-Invasive Ventilation? Patient continues on bipap    If no must comment. Facial area red/color change? No           If YES are Blister/Lesion present? No   If yes must notify nursing staff  BIPAP/CPAP skin barrier?   Yes    Skin barrier type:mepilex       Comments:        Alessandro Hernandez, RRT

## 2020-08-25 ENCOUNTER — CARE COORDINATION (OUTPATIENT)
Dept: CASE MANAGEMENT | Age: 70
End: 2020-08-25

## 2020-08-25 NOTE — DISCHARGE SUMMARY
Physician Discharge Summary     Patient ID:  Priscilla Mcclelland  [id number]  89 y.o.  1950    Admit date: 8/19/2020    Discharge date and time: 8/24/2020  3:48 PM     Admitting Physician: Constantin Horton DO     Discharge Physician: Edgar Piper MD.    Admission Diagnoses: Chest pain [R07.9]  Chest pain [R07.9]    Discharge Diagnoses: tracheomalacia   Obesity  ROSALVA  HTN    Discharged Condition: stable    Hospital Course: admitted from ER with SOB chest heaviness CTA chest tracheomalacia seen by pulmonary had bronch. consistent with tracheomalacia  Placed on cpap at night improved stabilized discharged home     Consults: pulmonary. Disposition: home    In process/preliminary results:  Outstanding Order Results     No orders found from 7/21/2020 to 8/20/2020.           Patient Instructions:   Discharge Medication List as of 8/24/2020  2:21 PM      START taking these medications    Details   albuterol-ipratropium (COMBIVENT RESPIMAT)  MCG/ACT AERS inhaler Inhale 1 puff into the lungs every 6 hours, Disp-1 Inhaler,R-0Normal         CONTINUE these medications which have NOT CHANGED    Details   carvedilol (COREG) 3.125 MG tablet Take 1 tablet by mouth 2 times daily (with meals), Disp-180 tablet,R-0Normal      fluticasone (FLONASE) 50 MCG/ACT nasal spray 2 sprays by Nasal route every morning, Disp-3 Bottle,R-1Normal      levothyroxine (SYNTHROID) 100 MCG tablet Take 1 tablet by mouth Daily, Disp-90 tablet,R-1Normal      omeprazole (PRILOSEC) 20 MG delayed release capsule Take 1 capsule by mouth 2 times daily, Disp-180 capsule,R-1Normal      sertraline (ZOLOFT) 50 MG tablet Take 1 tablet by mouth daily, Disp-90 tablet,R-1Normal      simvastatin (ZOCOR) 20 MG tablet Take 1 tablet by mouth nightly, Disp-90 tablet,R-1Normal      sucralfate (CARAFATE) 1 GM tablet Take 1 tablet by mouth 2 times daily Indications: pt had on 6/2/20, Disp-180 tablet,R-1Normal      isosorbide mononitrate (IMDUR) 30 MG extended release tablet Take 1 tablet by mouth daily, Disp-30 tablet,R-5Normal      mesalamine (LIALDA) 1.2 g EC tablet TAKE 1 TABLET BY MOUTH DAILYHistorical Med      nitroGLYCERIN (NITROSTAT) 0.4 MG SL tablet Place 1 tablet under the tongue every 5 minutes as needed for Chest pain, Disp-25 tablet,R-3Normal      aspirin 81 MG EC tablet Take 1 tablet by mouth daily, Disp-30 tablet,R-3Normal      ascorbic acid (VITAMIN C) 500 MG tablet Take 500 mg by mouth daily. Historical Med      multivitamin-iron-minerals-folic acid (CENTRUM) chewable tablet Take 1 tablet by mouth daily.   Historical Med           Activity: activity as tolerated  Diet: regular diet  Wound Care: none needed    Signed:  Jett Maria MD.  8/25/2020  8:00 AM

## 2020-08-25 NOTE — CARE COORDINATION
[facility] Transitions Initial Follow Up Call    Call within 2 business days of discharge: Yes    Patient: Carlos Cano Patient : 1950   MRN: [id number]  Reason for Admission: chest pain  Discharge Date: 20 RARS: Readmission Risk Score: 10      Last Discharge [facility]       Complaint Diagnosis Description Type Department Provider    20 Shortness of Breath; Chest Pain Chest pain, unspecified type . .. ED to Hosp-Admission (Discharged) (ADMITTED) [facility] CDU Pamela Hernandez DO; Roni Delatorre DO           Spoke with: Carlos Cano, patient    Facility[facility]    Non-face-to-face services provided:  Scheduled appointment with PCP-patient states appointment is tomorrow(20)  Obtained and reviewed discharge summary and/or continuity of care documents    Care Transitions 24 Hour Call    Do you have a copy of your discharge instructions?:  Yes  Do you have all of your prescriptions and are they filled?:  Yes  Have you been contacted by a [facility] Pharmacist?:  No  Have you scheduled your follow up appointment?:  Yes  How are you going to get to your appointment?:  Car - family or friend to transport (Comment: daughter)  Were you discharged with any Home Care or Post Acute Services:  No  Do you feel like you have everything you need to keep you well at home?:  Yes  Care Transitions Interventions  No Identified Needs       -First attempt to reach the patient for COVID-19 monitoring/initial BPCI call post hospital discharge. Message left with CTN's contact information requesting return phone call.    -Patient phoned CTN and left  in response to CTN message. -CTN phoned patient back for initial BPCI care transition call post hospital discharge.  Explained the role of Care Transition Nurse and the BPCI-A program, patient is agreeable to follow up post discharge from the hospital.   -Patient admitted to [facility] (1-RH) on 20 for chest pain.  -Patient states chest heaviness has resolved and she is having SOB with exertion only which is her baseline.  -Med review  completed. 1111F not entered as non [facility] PCP. -CTN verified with patient that she has [employer] though she has had at times problems with accessing. CTN provided patient with phone number to the MyChart help desk. -CTN explained that a member of the Care Transition Central Team will be contacting her for further follow up calls, patient is in agreement and denies any other needs or concerns at this time. -CTN will hand off to the Care Transition Central team to follow. Patient contacted regarding Mary Francois. Discussed COVID-19 related testing which was available at this time. Test results were negative. Patient informed of results, if available? Yes, patient was already aware of negative results. Care Transition Nurse/ Ambulatory Care Manager contacted the patient by telephone to perform post discharge assessment. Verified name and  with patient as identifiers. Provided introduction to self, and explanation of the CTN/ACM role, and reason for call due to risk factors for infection and/or exposure to COVID-19. Symptoms reviewed with patient who verbalized the following symptoms: no symptoms as related to COVID-19. Due to no new or worsening symptoms encounter was not routed to provider for escalation. Discussed follow-up appointments. If no appointment was previously scheduled, appointment scheduling offered: N/A    [facility] follow up appointment(s):   Future Appointments   Date Time Provider Yosi Duran   2020 11:30 AM DO David Juarez   10/26/2020 10:00 AM DO David Juarez     Non-[facility] follow up appointment(s): none     Advance Care Planning:   Does patient have an Advance Directive:  decision maker updated. Patient has following risk factors of: no known risk factors.  CTN/ACM reviewed discharge instructions, medical action plan and red flags such as increased shortness of breath, increasing fever and signs of decompensation with patient who verbalized understanding. Discussed exposure protocols and quarantine with CDC Guidelines What to do if you are sick with coronavirus disease 2019.  Patient was given an opportunity for questions and concerns. The patient agrees to contact the [employer] exposure line [phone], [address] of [facility]: ([phone]) and PCP office for questions related to their healthcare. CTN/ACM provided contact information for future needs. Reviewed and educated patient on any new and changed medications related to discharge diagnosis     Patient/family/caregiver given information for GetWell Loop and agrees to enroll yes  Patient's preferred e-mail: [email or web address]   Patient's preferred phone number: [phone]  Based on Loop alert triggers, patient will be contacted by nurse care manager for worsening symptoms.     Pt will be further monitored by COVID Loop Team based on severity of symptoms and risk factors        Follow Up  Future Appointments   Date Time Provider Yosi Duran   8/26/2020 11:30 AM DO David Juarez   10/26/2020 10:00 AM DO Wilberto Juarezcino [facility]       Maryuri Askew RN

## 2020-08-26 PROBLEM — J39.8 TRACHEOMALACIA: Status: ACTIVE | Noted: 2020-08-26

## 2020-09-01 ENCOUNTER — CARE COORDINATION (OUTPATIENT)
Dept: CASE MANAGEMENT | Age: 70
End: 2020-09-01

## 2020-09-01 NOTE — CARE COORDINATION
[facility] Transitions Follow Up Call    2020    Patient: Liza Dempsey  Patient : 1950   MRN: [id number]  Reason for Admission:   Discharge Date: 20 RARS: Readmission Risk Score: 10         Spoke with: Liza Dempsey, patient    Care Transitions Subsequent and Final Call    Subsequent and Final Calls  Have your medications changed?:  No  Do you have any questions related to your medications?:  No  Do you currently have any active services?:  No  Do you have any needs or concerns that I can assist you with?:  No  Identified Barriers:  None  Care Transitions Interventions  Other Interventions: Follow Up:  Contacted patient for BPCI-A follow up. Rebeca Arevalo stated she seems to be doing pretty good. Reports she continues to become short of breath usually when she is exerting herself. She stated at times she may become short of breath at rest.  She is not on oxygen. Denies distress. Discussed signs/symptoms and when to report to the ER. She verbalized understanding. She reports having an occasional dry cough. She also reports having chest discomfort at times. She is aware of when to contact MD with any new or worsening symptoms and when to report to the ER. She has all of her medications. She stated the office gave her samples of one of her inhalers (she was unsure of the name of medication). She does not have any questions or concerns for CTN at this time. Will continue to follow.      Future Appointments   Date Time Provider Yosi Duran   10/26/2020 10:00 AM Pamela Hernandez, DO David Patel RN

## 2020-09-10 ENCOUNTER — CARE COORDINATION (OUTPATIENT)
Dept: CASE MANAGEMENT | Age: 70
End: 2020-09-10

## 2020-09-10 NOTE — CARE COORDINATION
Attempted to reach pt for BPCI-A follow up call. Left message requesting call back.     Johnny Mckenzie RN  Care Transition Coordinator  [phone]

## 2020-09-17 ENCOUNTER — CARE COORDINATION (OUTPATIENT)
Dept: CASE MANAGEMENT | Age: 70
End: 2020-09-17

## 2020-09-17 NOTE — CARE COORDINATION
[facility] Transitions Follow Up Call    2020    Patient: Rodger Vora  Patient : 1950   MRN: [id number]  Reason for Admission:   Discharge Date: 20 RARS: Readmission Risk Score: 10      Needs to be reviewed by the provider   Additional needs identified to be addressed with provider No  none  Discussed COVID-19 related testing which was not done at this time. Test results were not done. Patient informed of results, if available? NA         Method of communication with provider : none    Care Transition Nurse (CTN) contacted the patient by telephone to follow up after admission on 2020. Verified name and  with patient as identifiers. Addressed changes since last contact: any new or worsening symptoms  Discharged needs reviewed: none  Follow up appointment completed? Yes    CTN reviewed discharge instructions, medical action plan and red flags with patient and discussed any barriers to care and/or understanding of plan of care after discharge. Discussed appropriate site of care based on symptoms and resources available to patient including: PCP. The patient agrees to contact the PCP office for questions related to their healthcare. Patients top risk factors for readmission: medical condition  Interventions to address risk factors: Scheduled appointment with Specialist-Has upcoming appointment but could not remember the date    Discussed follow-up appointments. If no appointment was previously scheduled, appointment scheduling offered: Yes Is follow up appointment scheduled within 7 days of discharge? Non-[facility] follow up appointment(s):     Plan for follow-up call in 7-10 days based on severity of symptoms and risk factors. Plan for next call: Follow up on any new or worsening symptoms  CTN provided contact information for future needs.     Care Transitions Subsequent and Final Call    Subsequent and Final Calls  Have your medications changed?:  No  Do you have any questions related to your medications?:  No  Do you currently have any active services?:  No  Do you have any needs or concerns that I can assist you with?:  No  Identified Barriers:  None  Care Transitions Interventions  Other Interventions: Follow Up:  Contacted patient for BPCI-A follow up. Spoke briefly with patient who was not at home. She stated she is doing alright. Reports she is no longer having chest pain but continues to become short of breath with exertion. Patient stated she has an upcoming appointment but could not remember the date. She will discuss use of inhaler with MD.  Discussed signs/symptoms and when to contact MD with any new or worsening symptoms. She verbalized understanding. She does not have any questions or concerns at this time. Will continue to follow.       Future Appointments   Date Time Provider Yosi Duran   10/26/2020 10:00 AM DO David Juarez RN

## 2020-09-28 ENCOUNTER — CARE COORDINATION (OUTPATIENT)
Dept: CASE MANAGEMENT | Age: 70
End: 2020-09-28

## 2020-09-28 NOTE — CARE COORDINATION
[facility] Transitions Follow Up Call    2020    Patient: Ashleigh Cheng  Patient : 1950   MRN: [id number]  Reason for Admission:   Discharge Date: 20 RARS: Readmission Risk Score: 10      Care Transition Nurse (CTN) contacted the patient by telephone to follow up after admission on 20-20. Verified name and  with patient as identifiers. Addressed changes since last contact: symptom management-No new or worsening symptoms  Discharged needs reviewed: none  Follow up appointment completed? Yes    CTN reviewed discharge instructions, medical action plan and red flags with patient and discussed any barriers to care and/or understanding of plan of care after discharge. Discussed appropriate site of care based on symptoms and resources available to patient including: Specialist. The patient agrees to contact the PCP office for questions related to their healthcare. Patients top risk factors for readmission: medical conditionInterventions to address risk factors: Reviewed signs/symptoms and when to contact MD      Discussed follow-up appointments. If no appointment was previously scheduled, appointment scheduling offered: Yes     Plan for follow-up call in 7-10 days based on severity of symptoms and risk factors. Plan for next call: symptom management-Follow up on any new or worsening symptoms  CTN provided contact information for future needs. Follow Up:  Contacted patient for BPCI-A follow up. Meng Medellin stated that she has good and bad days but for the most part she is doing okay. Denies having any chest pain/discomfort, pressure, tightness. Reports breathing has been okay but she may become short of breath with exertion. She stated she is still having issues with her trachea. She stated she has had issues with it collapsing. She stated she will be following up with the specialist.  Wants to get a second opinion at the [facility].   Patient stated she is not having any difficulty when drinking fluids but may have issues when eating. She is eating small bites. She is aware of when to contact MD with any new or worsening symptoms. No questions or concerns at this time. Will continue to follow.       Future Appointments   Date Time Provider Yosi Duran   10/26/2020 10:00 AM DO David Juarez RN

## 2020-10-08 ENCOUNTER — CARE COORDINATION (OUTPATIENT)
Dept: CASE MANAGEMENT | Age: 70
End: 2020-10-08

## 2020-10-08 NOTE — CARE COORDINATION
Attempted to reach pt for BPCI-A follow up call. Left message requesting call back.     Elvia Pan RN  Care Transition Coordinator  [phone]

## 2020-10-15 ENCOUNTER — CARE COORDINATION (OUTPATIENT)
Dept: CASE MANAGEMENT | Age: 70
End: 2020-10-15

## 2020-10-15 NOTE — CARE COORDINATION
[facility] Transitions Follow Up Call    10/15/2020    Patient: Tomás Anaya  Patient : 1950   MRN: [id number]  Reason for Admission:   Discharge Date: 20 RARS: Readmission Risk Score: 10     Attempted to reach patient by phone for follow up; BPCI-A. HIPAA compliant message left on patient's voicemail; CTN contact information provided.      Jose Lomeli RN

## 2020-10-26 ENCOUNTER — HOSPITAL ENCOUNTER (OUTPATIENT)
Age: 70
Discharge: HOME | End: 2020-10-28
Payer: MEDICARE

## 2020-10-26 LAB
ALBUMIN SERPL-MCNC: 4.3 G/DL (ref 3.5–5.2)
ALP BLD-CCNC: 83 U/L (ref 35–104)
ALT SERPL-CCNC: 12 U/L (ref 0–32)
ANION GAP SERPL CALCULATED.3IONS-SCNC: 14 MMOL/L (ref 7–16)
AST SERPL-CCNC: 14 U/L (ref 0–31)
BASOPHILS ABSOLUTE: 0.04 E9/L (ref 0–0.2)
BASOPHILS RELATIVE PERCENT: 0.6 % (ref 0–2)
BILIRUB SERPL-MCNC: 0.3 MG/DL (ref 0–1.2)
BUN BLDV-MCNC: 14 MG/DL (ref 8–23)
CALCIUM SERPL-MCNC: 9.5 MG/DL (ref 8.6–10.2)
CHLORIDE BLD-SCNC: 101 MMOL/L (ref 98–107)
CHOLESTEROL, TOTAL: 162 MG/DL (ref 0–199)
CO2: 27 MMOL/L (ref 22–29)
CREAT SERPL-MCNC: 0.6 MG/DL (ref 0.5–1)
EOSINOPHILS ABSOLUTE: 0.15 E9/L (ref 0.05–0.5)
EOSINOPHILS RELATIVE PERCENT: 2.4 % (ref 0–6)
GFR AFRICAN AMERICAN: >60
GFR NON-AFRICAN AMERICAN: >60 ML/MIN/1.73
GLUCOSE BLD-MCNC: 83 MG/DL (ref 74–99)
HCT VFR BLD CALC: 35 % (ref 34–48)
HDLC SERPL-MCNC: 47 MG/DL
HEMOGLOBIN: 10.4 G/DL (ref 11.5–15.5)
IMMATURE GRANULOCYTES #: 0.03 E9/L
IMMATURE GRANULOCYTES %: 0.5 % (ref 0–5)
LDL CHOLESTEROL CALCULATED: 87 MG/DL (ref 0–99)
LYMPHOCYTES ABSOLUTE: 0.79 E9/L (ref 1.5–4)
LYMPHOCYTES RELATIVE PERCENT: 12.5 % (ref 20–42)
MCH RBC QN AUTO: 25.9 PG (ref 26–35)
MCHC RBC AUTO-ENTMCNC: 29.7 % (ref 32–34.5)
MCV RBC AUTO: 87.1 FL (ref 80–99.9)
MONOCYTES ABSOLUTE: 0.45 E9/L (ref 0.1–0.95)
MONOCYTES RELATIVE PERCENT: 7.1 % (ref 2–12)
NEUTROPHILS ABSOLUTE: 4.85 E9/L (ref 1.8–7.3)
NEUTROPHILS RELATIVE PERCENT: 76.9 % (ref 43–80)
PDW BLD-RTO: 14.6 FL (ref 11.5–15)
PLATELET # BLD: 231 E9/L (ref 130–450)
PMV BLD AUTO: 11.6 FL (ref 7–12)
POTASSIUM SERPL-SCNC: 5 MMOL/L (ref 3.5–5)
RBC # BLD: 4.02 E12/L (ref 3.5–5.5)
SODIUM BLD-SCNC: 142 MMOL/L (ref 132–146)
TOTAL PROTEIN: 7.8 G/DL (ref 6.4–8.3)
TRIGL SERPL-MCNC: 141 MG/DL (ref 0–149)
TSH SERPL DL<=0.05 MIU/L-ACNC: 0.56 UIU/ML (ref 0.27–4.2)
VLDLC SERPL CALC-MCNC: 28 MG/DL
WBC # BLD: 6.3 E9/L (ref 4.5–11.5)

## 2020-10-26 PROCEDURE — 80053 COMPREHEN METABOLIC PANEL: CPT

## 2020-10-26 PROCEDURE — 84443 ASSAY THYROID STIM HORMONE: CPT

## 2020-10-26 PROCEDURE — 85025 COMPLETE CBC W/AUTO DIFF WBC: CPT

## 2020-10-26 PROCEDURE — 80061 LIPID PANEL: CPT

## 2020-11-02 ENCOUNTER — CARE COORDINATION (OUTPATIENT)
Dept: CASE MANAGEMENT | Age: 70
End: 2020-11-02

## 2020-11-03 PROBLEM — I25.10 CAD IN NATIVE ARTERY: Status: RESOLVED | Noted: 2020-06-23 | Resolved: 2020-11-03

## 2020-11-10 ENCOUNTER — CARE COORDINATION (OUTPATIENT)
Dept: CASE MANAGEMENT | Age: 70
End: 2020-11-10

## 2020-11-10 NOTE — CARE COORDINATION
[facility] Transitions Follow Up Call    11/10/2020     Patient: Mabel Hansen  Patient : 1950   MRN: [id number]  Reason for Admission:   Discharge Date: 20 RARS: Readmission Risk Score: 10      Follow Up: Attempted to contact patient for BPCI-A follow up. Unable to reach patient. Left message with contact information and request for call back.       Future Appointments   Date Time Provider Yosi Duran   2020  9:45 AM [facility] 1 SEYZ [facility]   2021  9:30 AM DO David Juarez RN

## 2020-11-16 ENCOUNTER — HOSPITAL ENCOUNTER (OUTPATIENT)
Age: 70
Discharge: HOME | End: 2020-11-16
Payer: MEDICARE

## 2020-11-16 ENCOUNTER — HOSPITAL ENCOUNTER (OUTPATIENT)
Dept: GENERAL RADIOLOGY | Age: 70
Discharge: HOME | End: 2020-11-18
Payer: MEDICARE

## 2020-11-16 LAB
ALBUMIN SERPL-MCNC: 4.3 G/DL (ref 3.5–5.2)
ANION GAP SERPL CALCULATED.3IONS-SCNC: 12 MMOL/L (ref 7–16)
BASOPHILS ABSOLUTE: 0.03 E9/L (ref 0–0.2)
BASOPHILS RELATIVE PERCENT: 0.5 % (ref 0–2)
BUN BLDV-MCNC: 16 MG/DL (ref 8–23)
C-REACTIVE PROTEIN: 1.1 MG/DL (ref 0–0.4)
CALCIUM SERPL-MCNC: 9.4 MG/DL (ref 8.6–10.2)
CHLORIDE BLD-SCNC: 102 MMOL/L (ref 98–107)
CO2: 29 MMOL/L (ref 22–29)
CREAT SERPL-MCNC: 0.7 MG/DL (ref 0.5–1)
EOSINOPHILS ABSOLUTE: 0.14 E9/L (ref 0.05–0.5)
EOSINOPHILS RELATIVE PERCENT: 2.2 % (ref 0–6)
FOLATE: >20 NG/ML (ref 4.8–24.2)
GFR AFRICAN AMERICAN: >60
GFR NON-AFRICAN AMERICAN: >60 ML/MIN/1.73
GLUCOSE BLD-MCNC: 114 MG/DL (ref 74–99)
HCT VFR BLD CALC: 33.2 % (ref 34–48)
HEMOGLOBIN: 10.3 G/DL (ref 11.5–15.5)
IMMATURE GRANULOCYTES #: 0.03 E9/L
IMMATURE GRANULOCYTES %: 0.5 % (ref 0–5)
IMMATURE RETIC FRACT: 9.2 % (ref 3–15.9)
IRON SATURATION: 17 % (ref 15–50)
IRON: 44 MCG/DL (ref 37–145)
LYMPHOCYTES ABSOLUTE: 0.85 E9/L (ref 1.5–4)
LYMPHOCYTES RELATIVE PERCENT: 13.5 % (ref 20–42)
MCH RBC QN AUTO: 25.9 PG (ref 26–35)
MCHC RBC AUTO-ENTMCNC: 31 % (ref 32–34.5)
MCV RBC AUTO: 83.6 FL (ref 80–99.9)
MONOCYTES ABSOLUTE: 0.44 E9/L (ref 0.1–0.95)
MONOCYTES RELATIVE PERCENT: 7 % (ref 2–12)
NEUTROPHILS ABSOLUTE: 4.79 E9/L (ref 1.8–7.3)
NEUTROPHILS RELATIVE PERCENT: 76.3 % (ref 43–80)
PDW BLD-RTO: 14.3 FL (ref 11.5–15)
PHOSPHORUS: 3.6 MG/DL (ref 2.5–4.5)
PLATELET # BLD: 224 E9/L (ref 130–450)
PMV BLD AUTO: 11 FL (ref 7–12)
POTASSIUM SERPL-SCNC: 4.1 MMOL/L (ref 3.5–5)
RBC # BLD: 3.97 E12/L (ref 3.5–5.5)
RETIC HGB EQUIVALENT: 29.3 PG (ref 28.2–36.6)
RETICULOCYTE ABSOLUTE COUNT: 0.07 E12/L
RETICULOCYTE COUNT PCT: 1.8 % (ref 0.4–1.9)
SEDIMENTATION RATE, ERYTHROCYTE: 45 MM/HR (ref 0–20)
SODIUM BLD-SCNC: 143 MMOL/L (ref 132–146)
TOTAL IRON BINDING CAPACITY: 261 MCG/DL (ref 250–450)
VITAMIN B-12: 605 PG/ML (ref 211–946)
WBC # BLD: 6.3 E9/L (ref 4.5–11.5)

## 2020-11-16 PROCEDURE — 83540 ASSAY OF IRON: CPT

## 2020-11-16 PROCEDURE — 85025 COMPLETE CBC W/AUTO DIFF WBC: CPT

## 2020-11-16 PROCEDURE — 82746 ASSAY OF FOLIC ACID SERUM: CPT

## 2020-11-16 PROCEDURE — 83550 IRON BINDING TEST: CPT

## 2020-11-16 PROCEDURE — 86140 C-REACTIVE PROTEIN: CPT

## 2020-11-16 PROCEDURE — 85045 AUTOMATED RETICULOCYTE COUNT: CPT

## 2020-11-16 PROCEDURE — 82607 VITAMIN B-12: CPT

## 2020-11-16 PROCEDURE — 82747 ASSAY OF FOLIC ACID RBC: CPT

## 2020-11-16 PROCEDURE — 77063 BREAST TOMOSYNTHESIS BI: CPT

## 2020-11-16 PROCEDURE — 80069 RENAL FUNCTION PANEL: CPT

## 2020-11-16 PROCEDURE — 85651 RBC SED RATE NONAUTOMATED: CPT

## 2020-11-16 PROCEDURE — 36415 COLL VENOUS BLD VENIPUNCTURE: CPT

## 2020-11-19 ENCOUNTER — CARE COORDINATION (OUTPATIENT)
Dept: CASE MANAGEMENT | Age: 70
End: 2020-11-19

## 2020-12-16 LAB
HCT VFR BLD CALC: 33.2 %
RBC FOLATE: NORMAL NG/ML

## 2020-12-17 ENCOUNTER — HOSPITAL ENCOUNTER (OUTPATIENT)
Dept: SLEEP CENTER | Age: 70
Discharge: HOME | End: 2020-12-17
Payer: MEDICARE

## 2020-12-17 PROCEDURE — 95800 SLP STDY UNATTENDED: CPT

## 2021-01-11 DIAGNOSIS — I10 ESSENTIAL HYPERTENSION: ICD-10-CM

## 2021-01-11 DIAGNOSIS — E03.9 ACQUIRED HYPOTHYROIDISM: ICD-10-CM

## 2021-01-12 LAB
ALBUMIN SERPL-MCNC: 4 G/DL (ref 3.5–5.2)
ALP BLD-CCNC: 85 U/L (ref 35–104)
ALT SERPL-CCNC: 13 U/L (ref 0–32)
ANION GAP SERPL CALCULATED.3IONS-SCNC: 11 MMOL/L (ref 7–16)
AST SERPL-CCNC: 17 U/L (ref 0–31)
BASOPHILS ABSOLUTE: 0.02 E9/L (ref 0–0.2)
BASOPHILS RELATIVE PERCENT: 0.3 % (ref 0–2)
BILIRUB SERPL-MCNC: 0.2 MG/DL (ref 0–1.2)
BUN BLDV-MCNC: 17 MG/DL (ref 8–23)
CALCIUM SERPL-MCNC: 9.2 MG/DL (ref 8.6–10.2)
CHLORIDE BLD-SCNC: 107 MMOL/L (ref 98–107)
CHOLESTEROL, TOTAL: 170 MG/DL (ref 0–199)
CO2: 27 MMOL/L (ref 22–29)
CREAT SERPL-MCNC: 0.7 MG/DL (ref 0.5–1)
EOSINOPHILS ABSOLUTE: 0.17 E9/L (ref 0.05–0.5)
EOSINOPHILS RELATIVE PERCENT: 2.9 % (ref 0–6)
GFR AFRICAN AMERICAN: >60
GFR NON-AFRICAN AMERICAN: >60 ML/MIN/1.73
GLUCOSE BLD-MCNC: 98 MG/DL (ref 74–99)
HCT VFR BLD CALC: 32.7 % (ref 34–48)
HDLC SERPL-MCNC: 51 MG/DL
HEMOGLOBIN: 9.6 G/DL (ref 11.5–15.5)
IMMATURE GRANULOCYTES #: 0.04 E9/L
IMMATURE GRANULOCYTES %: 0.7 % (ref 0–5)
LDL CHOLESTEROL CALCULATED: 99 MG/DL (ref 0–99)
LYMPHOCYTES ABSOLUTE: 0.92 E9/L (ref 1.5–4)
LYMPHOCYTES RELATIVE PERCENT: 15.6 % (ref 20–42)
MCH RBC QN AUTO: 25.8 PG (ref 26–35)
MCHC RBC AUTO-ENTMCNC: 29.4 % (ref 32–34.5)
MCV RBC AUTO: 87.9 FL (ref 80–99.9)
MONOCYTES ABSOLUTE: 0.46 E9/L (ref 0.1–0.95)
MONOCYTES RELATIVE PERCENT: 7.8 % (ref 2–12)
NEUTROPHILS ABSOLUTE: 4.27 E9/L (ref 1.8–7.3)
NEUTROPHILS RELATIVE PERCENT: 72.7 % (ref 43–80)
PDW BLD-RTO: 14.3 FL (ref 11.5–15)
PLATELET # BLD: 217 E9/L (ref 130–450)
PMV BLD AUTO: 11.5 FL (ref 7–12)
POTASSIUM SERPL-SCNC: 4.6 MMOL/L (ref 3.5–5)
RBC # BLD: 3.72 E12/L (ref 3.5–5.5)
SODIUM BLD-SCNC: 145 MMOL/L (ref 132–146)
TOTAL PROTEIN: 7.5 G/DL (ref 6.4–8.3)
TRIGL SERPL-MCNC: 99 MG/DL (ref 0–149)
TSH SERPL DL<=0.05 MIU/L-ACNC: 0.84 UIU/ML (ref 0.27–4.2)
VLDLC SERPL CALC-MCNC: 20 MG/DL
WBC # BLD: 5.9 E9/L (ref 4.5–11.5)

## 2021-01-13 NOTE — PROGRESS NOTES
4.2.  Based on patient's history and her tracheomalacia, if there is  also high clinical suspicion for having upper airway resistance and  apneas, I would recommend to repeat the test in an in-lab setting. 2.  In the meanwhile, the patient will benefit from nocturnal oxygen  supplements at 2 liters per minute.         Jareth Jacinto MD      D: 01/12/2021 6:28:38       T: 01/12/2021 6:33:15     [email or web address]  Job#: [id number]     Doc#: [id number]    CC:

## 2021-02-02 ENCOUNTER — HOSPITAL ENCOUNTER (OUTPATIENT)
Age: 71
Discharge: HOME | End: 2021-02-02
Payer: MEDICARE

## 2021-02-02 LAB
ALBUMIN SERPL-MCNC: 4.2 G/DL (ref 3.5–5.2)
ANION GAP SERPL CALCULATED.3IONS-SCNC: 12 MMOL/L (ref 7–16)
BASOPHILS ABSOLUTE: 0.02 E9/L (ref 0–0.2)
BASOPHILS RELATIVE PERCENT: 0.3 % (ref 0–2)
BUN BLDV-MCNC: 16 MG/DL (ref 8–23)
C-REACTIVE PROTEIN: 1.4 MG/DL (ref 0–0.4)
CALCIUM SERPL-MCNC: 9.4 MG/DL (ref 8.6–10.2)
CHLORIDE BLD-SCNC: 96 MMOL/L (ref 98–107)
CO2: 29 MMOL/L (ref 22–29)
CREAT SERPL-MCNC: 0.7 MG/DL (ref 0.5–1)
EOSINOPHILS ABSOLUTE: 0.13 E9/L (ref 0.05–0.5)
EOSINOPHILS RELATIVE PERCENT: 1.9 % (ref 0–6)
GFR AFRICAN AMERICAN: >60
GFR NON-AFRICAN AMERICAN: >60 ML/MIN/1.73
GLUCOSE BLD-MCNC: 153 MG/DL (ref 74–99)
HCT VFR BLD CALC: 33.7 % (ref 34–48)
HEMOGLOBIN: 10.4 G/DL (ref 11.5–15.5)
IMMATURE GRANULOCYTES #: 0.04 E9/L
IMMATURE GRANULOCYTES %: 0.6 % (ref 0–5)
LYMPHOCYTES ABSOLUTE: 0.73 E9/L (ref 1.5–4)
LYMPHOCYTES RELATIVE PERCENT: 10.5 % (ref 20–42)
MCH RBC QN AUTO: 26 PG (ref 26–35)
MCHC RBC AUTO-ENTMCNC: 30.9 % (ref 32–34.5)
MCV RBC AUTO: 84.3 FL (ref 80–99.9)
MONOCYTES ABSOLUTE: 0.48 E9/L (ref 0.1–0.95)
MONOCYTES RELATIVE PERCENT: 6.9 % (ref 2–12)
NEUTROPHILS ABSOLUTE: 5.55 E9/L (ref 1.8–7.3)
NEUTROPHILS RELATIVE PERCENT: 79.8 % (ref 43–80)
PDW BLD-RTO: 13.9 FL (ref 11.5–15)
PHOSPHORUS: 3.1 MG/DL (ref 2.5–4.5)
PLATELET # BLD: 233 E9/L (ref 130–450)
PMV BLD AUTO: 11.7 FL (ref 7–12)
POTASSIUM SERPL-SCNC: 4.3 MMOL/L (ref 3.5–5)
RBC # BLD: 4 E12/L (ref 3.5–5.5)
SEDIMENTATION RATE, ERYTHROCYTE: 45 MM/HR (ref 0–20)
SODIUM BLD-SCNC: 137 MMOL/L (ref 132–146)
WBC # BLD: 7 E9/L (ref 4.5–11.5)

## 2021-02-02 PROCEDURE — 36415 COLL VENOUS BLD VENIPUNCTURE: CPT

## 2021-02-02 PROCEDURE — 80069 RENAL FUNCTION PANEL: CPT

## 2021-02-02 PROCEDURE — 86140 C-REACTIVE PROTEIN: CPT

## 2021-02-02 PROCEDURE — 85651 RBC SED RATE NONAUTOMATED: CPT

## 2021-02-02 PROCEDURE — 85025 COMPLETE CBC W/AUTO DIFF WBC: CPT

## 2021-02-16 DIAGNOSIS — I20.9 NEW-ONSET ANGINA (HCC): ICD-10-CM

## 2021-02-16 RX ORDER — ISOSORBIDE MONONITRATE 30 MG/1
30 TABLET, EXTENDED RELEASE ORAL DAILY
Qty: 30 TABLET | Refills: 5 | Status: SHIPPED
Start: 2021-02-16 | End: 2021-08-23

## 2021-02-22 ENCOUNTER — NURSE ONLY (OUTPATIENT)
Dept: CARDIOLOGY CLINIC | Age: 71
End: 2021-02-22

## 2021-02-22 ENCOUNTER — OFFICE VISIT (OUTPATIENT)
Dept: CARDIOLOGY CLINIC | Age: 71
End: 2021-02-22
Payer: MEDICARE

## 2021-02-22 VITALS
SYSTOLIC BLOOD PRESSURE: 132 MMHG | WEIGHT: 191.2 LBS | HEART RATE: 79 BPM | DIASTOLIC BLOOD PRESSURE: 82 MMHG | BODY MASS INDEX: 38.55 KG/M2 | HEIGHT: 59 IN

## 2021-02-22 DIAGNOSIS — I42.8 NONISCHEMIC CARDIOMYOPATHY (HCC): Primary | ICD-10-CM

## 2021-02-22 DIAGNOSIS — R00.2 PALPITATIONS: ICD-10-CM

## 2021-02-22 PROCEDURE — G8484 FLU IMMUNIZE NO ADMIN: HCPCS | Performed by: INTERNAL MEDICINE

## 2021-02-22 PROCEDURE — 1036F TOBACCO NON-USER: CPT | Performed by: INTERNAL MEDICINE

## 2021-02-22 PROCEDURE — G8417 CALC BMI ABV UP PARAM F/U: HCPCS | Performed by: INTERNAL MEDICINE

## 2021-02-22 PROCEDURE — 93000 ELECTROCARDIOGRAM COMPLETE: CPT | Performed by: INTERNAL MEDICINE

## 2021-02-22 PROCEDURE — G8427 DOCREV CUR MEDS BY ELIG CLIN: HCPCS | Performed by: INTERNAL MEDICINE

## 2021-02-22 PROCEDURE — 1090F PRES/ABSN URINE INCON ASSESS: CPT | Performed by: INTERNAL MEDICINE

## 2021-02-22 PROCEDURE — 3017F COLORECTAL CA SCREEN DOC REV: CPT | Performed by: INTERNAL MEDICINE

## 2021-02-22 PROCEDURE — G8400 PT W/DXA NO RESULTS DOC: HCPCS | Performed by: INTERNAL MEDICINE

## 2021-02-22 PROCEDURE — 99214 OFFICE O/P EST MOD 30 MIN: CPT | Performed by: INTERNAL MEDICINE

## 2021-02-22 PROCEDURE — 1123F ACP DISCUSS/DSCN MKR DOCD: CPT | Performed by: INTERNAL MEDICINE

## 2021-02-22 PROCEDURE — 4040F PNEUMOC VAC/ADMIN/RCVD: CPT | Performed by: INTERNAL MEDICINE

## 2021-02-22 ASSESSMENT — ENCOUNTER SYMPTOMS
BLOOD IN STOOL: 0
COUGH: 0
BACK PAIN: 0
DIARRHEA: 0
VOMITING: 0
NAUSEA: 0
ABDOMINAL PAIN: 0
CONSTIPATION: 0
SHORTNESS OF BREATH: 1
WHEEZING: 0

## 2021-02-22 NOTE — PROGRESS NOTES
30 day [employer] event moniotor applied, per Dr Rylan De La O. ([id number]). Pt verbalized understanding.

## 2021-02-22 NOTE — PROGRESS NOTES
OUTPATIENT CARDIOLOGY FOLLOW-UP    HPI:    Name: Jez Sidhu    Age: 72 y.o. Primary Care Physician: Dominic Lyman DO    Date of Service: 2/22/2021    Chief Complaint: 6 months follow-up visit. History of present illness : 76-year-old obese female who comes today for follow-up visit. She was seen in consultation at the hospital on 8/20/2020 due to atypical chest pain and chronic dyspnea on exertion. She has history of moderate CAD with an nonphysiologically significant RCA stenosis by heart cath done in June 2020, nonischemic cardiomyopathy, hypertension, history of CVA and PFO, refused closure in the past, tracheomalacia, GERD and hiatal hernia, hypothyroidism, thoracic outlet syndrome, kidney stones, osteoarthritis and depression. Patient has been fairly active. She denies chest discomfort but has been complaining of chronic dyspnea on exertion. She has been feeling occasional palpitations lasting 5 to 10 minutes at times. Her palpitation can last 5 to 10 minutes. She sleeps with the head of the bed elevated and she admits to get chronic lower extremity edema. EKG done today revealed sinus rhythm at 79 bpm, left atrial enlargement, probable LVH with nonspecific T wave changes. Review of Systems:   Review of Systems   Constitutional: Negative for chills, fatigue and fever. HENT: Negative for nosebleeds. Respiratory: Positive for shortness of breath. Negative for cough and wheezing. Gastrointestinal: Negative for abdominal pain, blood in stool, constipation, diarrhea, nausea and vomiting. Genitourinary: Negative for dysuria and hematuria. Musculoskeletal: Negative for back pain, joint swelling and myalgias. Neurological: Negative for syncope and light-headedness. Psychiatric/Behavioral: The patient is not nervous/anxious.            Past Medical History:  Past Medical History:   Diagnosis Date    Arthritis     CAD (coronary artery disease)     Cerebrovascular disease     Chronic back pain     Congenital heart disease     CVA (cerebral vascular accident) ([facility] 1/6293    Embolic. ? tia?  Depression     Fracture of right shoulder 1/2011    Frequent UTI     GERD (gastroesophageal reflux disease)     H/O ligation of vein 1974 Bilaterally.  Hiatal hernia 2000    History of thoracic outlet syndrome     Hypercholesterolemia     Hypertension     Hypothyroidism     Kidney stones     Nephrolithiasis     Bilaterally.  Nonischemic cardiomyopathy ([facility] 6/25/2018    ROSALVA (obstructive sleep apnea) 04/16/2018    mild    Osteoarthritis     PFO (patent foramen ovale)     Pneumonia 1989    Thyroid disease     Hypothyroidism. Past Surgical History:  Past Surgical History:   Procedure Laterality Date    BRONCHOSCOPY N/A 8/21/2020    BRONCHOSCOPY performed by Anita Walton MD at [address]  03/05/2018    No significant CAD  (Dr Cristina Cruz)    COLONOSCOPY  11/04    Negative.  COLONOSCOPY  09/17/2013    COLONOSCOPY  12/30/2016    Sigmoid Colitis, Diverticulitis    DIAGNOSTIC CARDIAC CATH LAB PROCEDURE  5/17/1988    [facility], patent coronaries.  DIAGNOSTIC CARDIAC CATH LAB PROCEDURE  6/10/1992    [facility], patent coronaries.  DIAGNOSTIC CARDIAC CATH LAB PROCEDURE  10/26/2001    [facility], Normal cath, right femoral angiography and Perclose.  DIAGNOSTIC CARDIAC CATH LAB PROCEDURE  12/7/2005    [facility].    HYSTERECTOMY  1983    LITHOTRIPSY      MIDDLE EAR SURGERY  1987    Eardrum replaced in right ear.  TONSILLECTOMY      URETHRA SURGERY      VASCULAR SURGERY      veil ligation kylee legs       Family History:  Family History   Problem Relation Age of Onset    Coronary Art Dis Mother         s/p CABG    Alzheimer's Disease Mother     Heart Disease Brother 72        ACB       Social History:  Social History     Socioeconomic History    Marital status:       Spouse name: Not on file    Number of children: Not on file    Years of education: Not on file    Highest education level: Not on file   Occupational History    Not on file   Social Needs    Financial resource strain: Not on file    Food insecurity     Worry: Not on file     Inability: Not on file    Transportation needs     Medical: Not on file     Non-medical: Not on file   Tobacco Use    Smoking status: Former Smoker     Packs/day: 3.00     Years: 24.00     Pack years: 72.00     Types: Cigarettes     Start date: 66     Quit date: 1999     Years since quittin.1    Smokeless tobacco: Never Used    Tobacco comment: quit 17 yerars ago   Substance and Sexual Activity    Alcohol use: No     Alcohol/week: 0.0 standard drinks     Comment: denies caffeine    Drug use: No    Sexual activity: Not Currently   Lifestyle    Physical activity     Days per week: Not on file     Minutes per session: Not on file    Stress: Not on file   Relationships    Social connections     Talks on phone: Not on file     Gets together: Not on file     Attends Hinduism service: Not on file     Active member of club or organization: Not on file     Attends meetings of clubs or organizations: Not on file     Relationship status: Not on file    Intimate partner violence     Fear of current or ex partner: Not on file     Emotionally abused: Not on file     Physically abused: Not on file     Forced sexual activity: Not on file   Other Topics Concern    Not on file   Social History Narrative    Not on file       Allergies: Allergies   Allergen Reactions    Biaxin [Clarithromycin] Nausea And Vomiting and Other (See Comments)     Abdominal pain    Codeine Nausea And Vomiting    Neosporin [Bacitracin-Neomycin-Polymyxin] Swelling    Sulfa Antibiotics Itching and Nausea And Vomiting     decrease in Blood pressure.     Clarithromycin Other (See Comments)    Neomycin-Polymyxin-Gramicidin Swelling     redness    Septra [Sulfamethoxazole-Trimethoprim]     Vioxx [Rofecoxib]  Sulfasalazine Nausea And Vomiting     severe headaches. Current Medications:  Current Outpatient Medications   Medication Sig Dispense Refill    isosorbide mononitrate (IMDUR) 30 MG extended release tablet Take 1 tablet by mouth daily 30 tablet 5    simvastatin (ZOCOR) 20 MG tablet Take 1 tablet by mouth nightly 90 tablet 1    sertraline (ZOLOFT) 50 MG tablet Take 1 tablet by mouth nightly 90 tablet 1    levothyroxine (SYNTHROID) 100 MCG tablet Take 1 tablet by mouth Daily 90 tablet 1    glycopyrrolate-formoterol (BEVESPI AEROSPHERE) 9-4.8 MCG/ACT AERO Inhale 2 puffs into the lungs 2 times daily 10.7 g 5    carvedilol (COREG) 3.125 MG tablet Take 1 tablet by mouth 2 times daily (with meals) 180 tablet 0    omeprazole 20 MG EC tablet Take 20 mg by mouth daily      sucralfate (CARAFATE) 1 GM tablet Take 1 g by mouth 2 times daily      mesalamine (LIALDA) 1.2 g EC tablet TAKE 1 TABLET BY MOUTH DAILY      nitroGLYCERIN (NITROSTAT) 0.4 MG SL tablet Place 1 tablet under the tongue every 5 minutes as needed for Chest pain 25 tablet 3    aspirin 81 MG EC tablet Take 1 tablet by mouth daily 30 tablet 3    ascorbic acid (VITAMIN C) 500 MG tablet Take 500 mg by mouth daily.  multivitamin-iron-minerals-folic acid (CENTRUM) chewable tablet Take 1 tablet by mouth daily. No current facility-administered medications for this visit. Physical Exam:  There were no vitals taken for this visit. Wt Readings from Last 3 Encounters:   02/02/21 192 lb (87.1 kg)   01/11/21 191 lb 12.8 oz (87 kg)   12/08/20 189 lb 9.6 oz (86 kg)       Physical Exam  Constitutional:       General: She is not in acute distress. Appearance: She is well-developed. She is obese. HENT:      Head: Normocephalic and atraumatic. Neck:      Musculoskeletal: Neck supple. Vascular: No carotid bruit or JVD. Cardiovascular:      Rate and Rhythm: Normal rate and regular rhythm. Heart sounds: No murmur.  No friction rub. No gallop. Pulmonary:      Breath sounds: Normal breath sounds. No wheezing or rales. Chest:      Chest wall: No tenderness. Abdominal:      General: Bowel sounds are normal. There is no distension. Palpations: Abdomen is soft. There is no mass. Tenderness: There is no abdominal tenderness. Comments: No abdominal bruit. Musculoskeletal:      Right lower leg: No edema. Left lower leg: No edema. Skin:     General: Skin is warm and dry. Neurological:      Mental Status: She is alert and oriented to person, place, and time.            Laboratory Tests:  Lab Results   Component Value Date    CREATININE 0.7 02/02/2021    BUN 16 02/02/2021     02/02/2021    K 4.3 02/02/2021    CL 96 (L) 02/02/2021    CO2 29 02/02/2021       Lab Results   Component Value Date    WBC 7.0 02/02/2021    HGB 10.4 (L) 02/02/2021    HCT 33.7 (L) 02/02/2021    MCV 84.3 02/02/2021     02/02/2021     Lab Results   Component Value Date    ALT 13 01/11/2021    AST 17 01/11/2021    ALKPHOS 85 01/11/2021    BILITOT 0.2 01/11/2021     Lab Results   Component Value Date    CKTOTAL 38 04/23/2011    TROPONINI <0.01 08/20/2020    TROPONINI <0.01 08/20/2020    TROPONINI <0.01 08/19/2020     Lab Results   Component Value Date    INR 1.0 06/18/2020    INR 1.0 10/11/2014    INR 1.1 04/22/2011    PROTIME 11.1 06/18/2020    PROTIME 10.5 10/11/2014    PROTIME 11.9 04/22/2011     Lab Results   Component Value Date    TSH 0.836 01/11/2021       Lab Results   Component Value Date    CHOL 170 01/11/2021    CHOL 162 10/26/2020    CHOL 143 07/22/2020     Lab Results   Component Value Date    TRIG 99 01/11/2021    TRIG 141 10/26/2020    TRIG 135 07/22/2020     Lab Results   Component Value Date    HDL 51 01/11/2021    HDL 47 10/26/2020    HDL 43 07/22/2020     Lab Results   Component Value Date    LDLCALC 99 01/11/2021    LDLCALC 87 10/26/2020    LDLCALC 73 07/22/2020     Lab Results   Component Value Date LABVLDL 20 01/11/2021    LABVLDL 28 10/26/2020    LABVLDL 27 07/22/2020       Cardiac Tests:      SARAH done on 7/9/2018:   SARAH terminated early due to significant O2 desaturations. Low normal left ventricular ejection fraction. No evidence of a left atrial appendage thrombus. PFO with mild degree of right to left interatrial shunting on bubble   study. Mild mitral regurgitation. Mild aortic regurgitation. Mild-moderate tricuspid regurgitation. RV-RA gradient is estimated at 27 mmHg. Left heart catheterization done on 6/23/2020:  1. Moderate non-physiologically significant tubular mid RCA stenosis. 2.  40% ostial proximal PDA stenosis. 3.  50% ostial second diagonal stenosis. 4.  30 to 40% proximal OM1 stenosis. 5.  LVEDP 16 mmHg. 6.  Ejection fraction of 55%. ASSESSMENT / PLAN:  -Palpitations: Could be due to PACs, PVCs, PAF or nonsustained VT.  -Dyspnea on exertion: Probably multifactorial in origin including probable diastolic dysfunction due to hypertension, mild systolic dysfunction, anemia, obesity and deconditioning. -CAD: Stable with no angina.  -History of nonischemic cardiomyopathy with mild systolic dysfunction: Clinically compensated. -Hyperlipidemia: On simvastatin.  -History of CVA. -History of PFO. Patient refused closure in the past.  -Tracheomalacia. -Hypothyroidism. -Anemia.  -Osteoarthritis. -Obesity.  -Depression. We will continue current cardiac medications. Will arrange for the patient to have an echocardiogram to reassess her ejection fraction and to follow-up her valvular heart disease. Will arrange for the patient to have a 30 days event recorder to rule out significant arrhythmia. Patient was advised to lose weight. We will follow-up at the office in 1 year. The patient's current medication list, allergies, problem list and results of all previously ordered testing were reviewed at today's visit. {  Kaye Schneider MD , [facility].   [facility]) Cardiology

## 2021-03-03 DIAGNOSIS — D64.9 ANEMIA, UNSPECIFIED TYPE: ICD-10-CM

## 2021-03-03 LAB
FERRITIN: 189 NG/ML
IRON SATURATION: 19 % (ref 15–50)
IRON: 51 MCG/DL (ref 37–145)
TOTAL IRON BINDING CAPACITY: 265 MCG/DL (ref 250–450)

## 2021-04-01 ENCOUNTER — HOSPITAL ENCOUNTER (INPATIENT)
Age: 71
LOS: 5 days | Discharge: HOME | DRG: 287 | End: 2021-04-09
Attending: EMERGENCY MEDICINE | Admitting: NEUROMUSCULOSKELETAL MEDICINE & OMM
Payer: MEDICARE

## 2021-04-01 DIAGNOSIS — R07.9 CHEST PAIN, UNSPECIFIED TYPE: Primary | ICD-10-CM

## 2021-04-01 DIAGNOSIS — R06.02 SHORTNESS OF BREATH: ICD-10-CM

## 2021-04-01 DIAGNOSIS — R55 SYNCOPE AND COLLAPSE: ICD-10-CM

## 2021-04-01 PROCEDURE — 99284 EMERGENCY DEPT VISIT MOD MDM: CPT

## 2021-04-01 RX ORDER — 0.9 % SODIUM CHLORIDE 0.9 %
1000 INTRAVENOUS SOLUTION INTRAVENOUS ONCE
Status: COMPLETED | OUTPATIENT
Start: 2021-04-02 | End: 2021-04-02

## 2021-04-01 NOTE — LETTER
Beneficiary Notification Letter  BPCI Advanced     Your Doctor or [address],    We wanted to let you know that your health care provider, [address] [place] [address] Diann, has volunteered to take part in our [facility] for [facility] & Medicaid Services (CMS) Bundled Payments for [facility] (BPCI Advanced). This doesnt change your Medicare rights or benefits and you dont need to do anything. What are bundled payments? A bundled payment combines, or bundles together, payments that Medicare makes to your health care providers for the many different kinds of medical services you might get in a specific time period. In BPCI Advanced, this time period could include a hospital inpatient stay or outpatient procedure, plus 90 days. Why would Medicare bundle payments? Bundled payments are thought of as a value-based way to pay because health care providers are responsible for both the quality and cost of medical care they give. This is a relatively new way of paying health care providers compared to thefee-for-service way Medicare has traditionally paid, where providers are paid separately for each service they provide. Bundled payments encourage these providers to work together to provide better, more coordinated care during your hospital stay, or outpatient procedure, and through your recovery. What does BPCI Advance mean for me? Youre more likely to get even better care when hospitals, doctors, and other health care providers work together. In BPCI Advanced, hospitals, doctors, and other health care providers may be rewarded for providing better, more coordinated health care. Medicare will watch BPCI Advanced participants closely to make sure that you and other patients keep getting efficient, high quality care. What do I need to know about BPCI Advanced?   Whats most important for you to know is that your Medicare rights and benefits wont change because your health care provider is participating in [facility]. Medicare will keep covering all of your medically necessary services. Even though Medicare will pay your doctor in a different way under BPCI Advanced, how much you have to pay wont change. Health care providers and suppliers who are enrolled in Medicare will submit their Medicare claims like they always have. Youll have all the same Medicare rights and protections, including the right to choose which hospital, doctor, or other health care provider you see. If you dont want to get care from a health care provider whos participating in [facility], then youll have to choose a different health care provider whos not participating in the Model. How can I give feedback about my health care? Medicare might ask you to take a voluntary survey about the services and care you received from [address] [place] [address] during your hospital stay or outpatient procedure and for a specific period of time afterwards. You can decide whether you want to take the voluntary survey, but if you do, itll help Medicare make BPCI Advanced and the care of other Medicare patients better. If you have concerns or complaints about your care, you can:   · Talk to your doctor or health care provider. · Contact your Beneficiary and Family Centered Care Quality Improvement   Organization [facility]). You can get your [employer]s phone number  at  Medicare.gov/contacts or by calling 1-800-MEDICARE. TTY users can call  3-579.190.1999. Where can I learn more about BPCI Advanced? Learn more about BPCI Advanced at https://innovation.cms.gov/initiatives/bpci-advanced/:  · A list of all the hospitals and physician group practices in the country participating in [address] [place] [address]. · All of the inpatient and outpatient Clinical Episodes that are currently included under BPCI Advanced.  A Clinical Episode is a grouping of medical conditions or diagnoses that are included in the [address].

## 2021-04-02 ENCOUNTER — APPOINTMENT (OUTPATIENT)
Dept: GENERAL RADIOLOGY | Age: 71
DRG: 287 | End: 2021-04-02
Payer: MEDICARE

## 2021-04-02 ENCOUNTER — APPOINTMENT (OUTPATIENT)
Dept: CT IMAGING | Age: 71
DRG: 287 | End: 2021-04-02
Payer: MEDICARE

## 2021-04-02 ENCOUNTER — APPOINTMENT (OUTPATIENT)
Dept: NUCLEAR MEDICINE | Age: 71
DRG: 287 | End: 2021-04-02
Payer: MEDICARE

## 2021-04-02 LAB
ALBUMIN SERPL-MCNC: 4.5 G/DL (ref 3.5–5.2)
ALP BLD-CCNC: 110 U/L (ref 35–104)
ALT SERPL-CCNC: 32 U/L (ref 0–32)
ANION GAP SERPL CALCULATED.3IONS-SCNC: 13 MMOL/L (ref 7–16)
AST SERPL-CCNC: 46 U/L (ref 0–31)
BASOPHILS ABSOLUTE: 0.03 E9/L (ref 0–0.2)
BASOPHILS RELATIVE PERCENT: 0.3 % (ref 0–2)
BILIRUB SERPL-MCNC: 0.2 MG/DL (ref 0–1.2)
BUN BLDV-MCNC: 21 MG/DL (ref 8–23)
CALCIUM SERPL-MCNC: 9.3 MG/DL (ref 8.6–10.2)
CHLORIDE BLD-SCNC: 99 MMOL/L (ref 98–107)
CO2: 27 MMOL/L (ref 22–29)
CREAT SERPL-MCNC: 0.7 MG/DL (ref 0.5–1)
EKG ATRIAL RATE: 96 BPM
EKG P AXIS: 35 DEGREES
EKG P-R INTERVAL: 174 MS
EKG Q-T INTERVAL: 394 MS
EKG QRS DURATION: 94 MS
EKG QTC CALCULATION (BAZETT): 497 MS
EKG R AXIS: 7 DEGREES
EKG T AXIS: 79 DEGREES
EKG VENTRICULAR RATE: 96 BPM
EOSINOPHILS ABSOLUTE: 0.22 E9/L (ref 0.05–0.5)
EOSINOPHILS RELATIVE PERCENT: 1.9 % (ref 0–6)
GFR AFRICAN AMERICAN: >60
GFR NON-AFRICAN AMERICAN: >60 ML/MIN/1.73
GLUCOSE BLD-MCNC: 157 MG/DL (ref 74–99)
HCT VFR BLD CALC: 34.8 % (ref 34–48)
HEMOGLOBIN: 11 G/DL (ref 11.5–15.5)
IMMATURE GRANULOCYTES #: 0.15 E9/L
IMMATURE GRANULOCYTES %: 1.3 % (ref 0–5)
INR BLD: 1
LV EF: 41 %
LVEF MODALITY: NORMAL
LYMPHOCYTES ABSOLUTE: 1 E9/L (ref 1.5–4)
LYMPHOCYTES RELATIVE PERCENT: 8.8 % (ref 20–42)
MCH RBC QN AUTO: 26.5 PG (ref 26–35)
MCHC RBC AUTO-ENTMCNC: 31.6 % (ref 32–34.5)
MCV RBC AUTO: 83.9 FL (ref 80–99.9)
MONOCYTES ABSOLUTE: 0.69 E9/L (ref 0.1–0.95)
MONOCYTES RELATIVE PERCENT: 6.1 % (ref 2–12)
NEUTROPHILS ABSOLUTE: 9.27 E9/L (ref 1.8–7.3)
NEUTROPHILS RELATIVE PERCENT: 81.6 % (ref 43–80)
PDW BLD-RTO: 14.2 FL (ref 11.5–15)
PLATELET # BLD: 231 E9/L (ref 130–450)
PMV BLD AUTO: 10.8 FL (ref 7–12)
POTASSIUM SERPL-SCNC: 4.4 MMOL/L (ref 3.5–5)
PRO-BNP: 592 PG/ML (ref 0–125)
PROTHROMBIN TIME: 10.9 SEC (ref 9.3–12.4)
RBC # BLD: 4.15 E12/L (ref 3.5–5.5)
SARS-COV-2, NAAT: NOT DETECTED
SODIUM BLD-SCNC: 139 MMOL/L (ref 132–146)
TOTAL PROTEIN: 8.3 G/DL (ref 6.4–8.3)
TROPONIN: 0.03 NG/ML (ref 0–0.03)
TROPONIN: 0.04 NG/ML (ref 0–0.03)
WBC # BLD: 11.4 E9/L (ref 4.5–11.5)

## 2021-04-02 PROCEDURE — 93016 CV STRESS TEST SUPVJ ONLY: CPT | Performed by: INTERNAL MEDICINE

## 2021-04-02 PROCEDURE — 93017 CV STRESS TEST TRACING ONLY: CPT

## 2021-04-02 PROCEDURE — 6360000002 HC RX W HCPCS: Performed by: NEUROMUSCULOSKELETAL MEDICINE & OMM

## 2021-04-02 PROCEDURE — 99222 1ST HOSP IP/OBS MODERATE 55: CPT | Performed by: INTERNAL MEDICINE

## 2021-04-02 PROCEDURE — 3430000000 HC RX DIAGNOSTIC RADIOPHARMACEUTICAL: Performed by: RADIOLOGY

## 2021-04-02 PROCEDURE — 6370000000 HC RX 637 (ALT 250 FOR IP): Performed by: EMERGENCY MEDICINE

## 2021-04-02 PROCEDURE — 71045 X-RAY EXAM CHEST 1 VIEW: CPT

## 2021-04-02 PROCEDURE — 2580000003 HC RX 258: Performed by: NURSE PRACTITIONER

## 2021-04-02 PROCEDURE — 93005 ELECTROCARDIOGRAM TRACING: CPT | Performed by: NURSE PRACTITIONER

## 2021-04-02 PROCEDURE — 80053 COMPREHEN METABOLIC PANEL: CPT

## 2021-04-02 PROCEDURE — G0378 HOSPITAL OBSERVATION PER HR: HCPCS

## 2021-04-02 PROCEDURE — 94640 AIRWAY INHALATION TREATMENT: CPT

## 2021-04-02 PROCEDURE — 78452 HT MUSCLE IMAGE SPECT MULT: CPT

## 2021-04-02 PROCEDURE — 2580000003 HC RX 258: Performed by: RADIOLOGY

## 2021-04-02 PROCEDURE — 6360000002 HC RX W HCPCS: Performed by: INTERNAL MEDICINE

## 2021-04-02 PROCEDURE — 70450 CT HEAD/BRAIN W/O DYE: CPT

## 2021-04-02 PROCEDURE — 72131 CT LUMBAR SPINE W/O DYE: CPT

## 2021-04-02 PROCEDURE — 71275 CT ANGIOGRAPHY CHEST: CPT

## 2021-04-02 PROCEDURE — 94664 DEMO&/EVAL PT USE INHALER: CPT

## 2021-04-02 PROCEDURE — APPSS60 APP SPLIT SHARED TIME 46-60 MINUTES: Performed by: PHYSICIAN ASSISTANT

## 2021-04-02 PROCEDURE — 2700000000 HC OXYGEN THERAPY PER DAY

## 2021-04-02 PROCEDURE — 83880 ASSAY OF NATRIURETIC PEPTIDE: CPT

## 2021-04-02 PROCEDURE — 78452 HT MUSCLE IMAGE SPECT MULT: CPT | Performed by: INTERNAL MEDICINE

## 2021-04-02 PROCEDURE — 84484 ASSAY OF TROPONIN QUANT: CPT

## 2021-04-02 PROCEDURE — 36415 COLL VENOUS BLD VENIPUNCTURE: CPT

## 2021-04-02 PROCEDURE — 85025 COMPLETE CBC W/AUTO DIFF WBC: CPT

## 2021-04-02 PROCEDURE — 6370000000 HC RX 637 (ALT 250 FOR IP): Performed by: NEUROMUSCULOSKELETAL MEDICINE & OMM

## 2021-04-02 PROCEDURE — 6360000004 HC RX CONTRAST MEDICATION: Performed by: RADIOLOGY

## 2021-04-02 PROCEDURE — 87635 SARS-COV-2 COVID-19 AMP PRB: CPT

## 2021-04-02 PROCEDURE — 85610 PROTHROMBIN TIME: CPT

## 2021-04-02 PROCEDURE — 6370000000 HC RX 637 (ALT 250 FOR IP): Performed by: INTERNAL MEDICINE

## 2021-04-02 PROCEDURE — 93018 CV STRESS TEST I&R ONLY: CPT | Performed by: INTERNAL MEDICINE

## 2021-04-02 PROCEDURE — A9500 TC99M SESTAMIBI: HCPCS | Performed by: RADIOLOGY

## 2021-04-02 RX ORDER — IPRATROPIUM BROMIDE AND ALBUTEROL SULFATE 2.5; .5 MG/3ML; MG/3ML
1 SOLUTION RESPIRATORY (INHALATION) ONCE
Status: COMPLETED | OUTPATIENT
Start: 2021-04-02 | End: 2021-04-02

## 2021-04-02 RX ORDER — SODIUM CHLORIDE 0.9 % (FLUSH) 0.9 %
10 SYRINGE (ML) INJECTION PRN
Status: DISCONTINUED | OUTPATIENT
Start: 2021-04-02 | End: 2021-04-09 | Stop reason: HOSPADM

## 2021-04-02 RX ORDER — LEVOTHYROXINE SODIUM 0.1 MG/1
100 TABLET ORAL DAILY
Status: DISCONTINUED | OUTPATIENT
Start: 2021-04-02 | End: 2021-04-09 | Stop reason: HOSPADM

## 2021-04-02 RX ORDER — FLUTICASONE PROPIONATE 50 MCG
2 SPRAY, SUSPENSION (ML) NASAL DAILY
COMMUNITY
End: 2022-09-26 | Stop reason: SDUPTHER

## 2021-04-02 RX ORDER — ISOSORBIDE MONONITRATE 30 MG/1
30 TABLET, EXTENDED RELEASE ORAL DAILY
Status: DISCONTINUED | OUTPATIENT
Start: 2021-04-02 | End: 2021-04-07

## 2021-04-02 RX ORDER — SODIUM CHLORIDE 9 MG/ML
25 INJECTION, SOLUTION INTRAVENOUS PRN
Status: DISCONTINUED | OUTPATIENT
Start: 2021-04-02 | End: 2021-04-09 | Stop reason: HOSPADM

## 2021-04-02 RX ORDER — SIMVASTATIN 10 MG
20 TABLET ORAL NIGHTLY
Status: DISCONTINUED | OUTPATIENT
Start: 2021-04-02 | End: 2021-04-02

## 2021-04-02 RX ORDER — ASCORBIC ACID 500 MG
500 TABLET ORAL DAILY
Status: DISCONTINUED | OUTPATIENT
Start: 2021-04-02 | End: 2021-04-09 | Stop reason: HOSPADM

## 2021-04-02 RX ORDER — CARVEDILOL 3.12 MG/1
3.12 TABLET ORAL 2 TIMES DAILY WITH MEALS
Status: DISCONTINUED | OUTPATIENT
Start: 2021-04-02 | End: 2021-04-02

## 2021-04-02 RX ORDER — M-VIT,TX,IRON,MINS/CALC/FOLIC 27MG-0.4MG
1 TABLET ORAL DAILY
Status: DISCONTINUED | OUTPATIENT
Start: 2021-04-02 | End: 2021-04-09 | Stop reason: HOSPADM

## 2021-04-02 RX ORDER — ROSUVASTATIN CALCIUM 20 MG/1
20 TABLET, COATED ORAL NIGHTLY
Status: DISCONTINUED | OUTPATIENT
Start: 2021-04-02 | End: 2021-04-09 | Stop reason: HOSPADM

## 2021-04-02 RX ORDER — CARVEDILOL 6.25 MG/1
6.25 TABLET ORAL 2 TIMES DAILY WITH MEALS
Status: DISCONTINUED | OUTPATIENT
Start: 2021-04-02 | End: 2021-04-09 | Stop reason: HOSPADM

## 2021-04-02 RX ORDER — MESALAMINE 1.2 G/1
1200 TABLET, DELAYED RELEASE ORAL
Status: DISCONTINUED | OUTPATIENT
Start: 2021-04-03 | End: 2021-04-09 | Stop reason: HOSPADM

## 2021-04-02 RX ORDER — SUCRALFATE 1 G/1
1 TABLET ORAL 2 TIMES DAILY
Status: DISCONTINUED | OUTPATIENT
Start: 2021-04-02 | End: 2021-04-09 | Stop reason: HOSPADM

## 2021-04-02 RX ORDER — ARFORMOTEROL TARTRATE 15 UG/2ML
15 SOLUTION RESPIRATORY (INHALATION) 2 TIMES DAILY
Status: DISCONTINUED | OUTPATIENT
Start: 2021-04-02 | End: 2021-04-09 | Stop reason: HOSPADM

## 2021-04-02 RX ORDER — ASPIRIN 81 MG/1
81 TABLET ORAL DAILY
Status: DISCONTINUED | OUTPATIENT
Start: 2021-04-02 | End: 2021-04-09 | Stop reason: HOSPADM

## 2021-04-02 RX ORDER — PANTOPRAZOLE SODIUM 40 MG/1
40 TABLET, DELAYED RELEASE ORAL DAILY
Status: DISCONTINUED | OUTPATIENT
Start: 2021-04-02 | End: 2021-04-09 | Stop reason: HOSPADM

## 2021-04-02 RX ADMIN — Medication 10 ML: at 04:23

## 2021-04-02 RX ADMIN — IPRATROPIUM BROMIDE AND ALBUTEROL SULFATE 1 AMPULE: .5; 3 SOLUTION RESPIRATORY (INHALATION) at 05:14

## 2021-04-02 RX ADMIN — ARFORMOTEROL TARTRATE 15 MCG: 15 SOLUTION RESPIRATORY (INHALATION) at 19:55

## 2021-04-02 RX ADMIN — ARFORMOTEROL TARTRATE 15 MCG: 15 SOLUTION RESPIRATORY (INHALATION) at 12:29

## 2021-04-02 RX ADMIN — Medication 11.5 MILLICURIE: at 13:08

## 2021-04-02 RX ADMIN — IOPAMIDOL 60 ML: 755 INJECTION, SOLUTION INTRAVENOUS at 04:23

## 2021-04-02 RX ADMIN — CARVEDILOL 6.25 MG: 6.25 TABLET, FILM COATED ORAL at 17:02

## 2021-04-02 RX ADMIN — SODIUM CHLORIDE 1000 ML: 9 INJECTION, SOLUTION INTRAVENOUS at 00:24

## 2021-04-02 RX ADMIN — Medication 10 ML: at 21:02

## 2021-04-02 RX ADMIN — IPRATROPIUM BROMIDE 0.5 MG: 0.5 SOLUTION RESPIRATORY (INHALATION) at 12:29

## 2021-04-02 RX ADMIN — IPRATROPIUM BROMIDE 0.5 MG: 0.5 SOLUTION RESPIRATORY (INHALATION) at 19:55

## 2021-04-02 RX ADMIN — SUCRALFATE 1 G: 1 TABLET ORAL at 17:02

## 2021-04-02 RX ADMIN — REGADENOSON 0.4 MG: 0.08 INJECTION, SOLUTION INTRAVENOUS at 14:47

## 2021-04-02 RX ADMIN — SERTRALINE 50 MG: 50 TABLET, FILM COATED ORAL at 21:02

## 2021-04-02 RX ADMIN — ROSUVASTATIN 20 MG: 20 TABLET, FILM COATED ORAL at 21:02

## 2021-04-02 RX ADMIN — Medication 35 MILLICURIE: at 14:46

## 2021-04-02 ASSESSMENT — PAIN SCALES - GENERAL
PAINLEVEL_OUTOF10: 0
PAINLEVEL_OUTOF10: 0

## 2021-04-02 NOTE — PROGRESS NOTES
Patient's jewelry removed and placed in an envelope. Envelope given to patient. Patient left CT department with jewelry. Patient had a single gold chain with one charm attached.

## 2021-04-02 NOTE — CONSULTS
Inpatient Cardiology Consultation      Reason for Consult:  CP    Consulting Physician: Donna Smith MD    Requesting Physician:  No att. providers found    Date of Consultation: 4/2/2021    HISTORY OF PRESENT ILLNESS OF Santiago Wells located in  room 4509B. Santiago Wells is a 72 y.o. female   seen by Dr. Gia Leon on 7/26/2018 and  known to Dr. Litzy Chance MD evaluated via telephone on 5/19/2020, consult on 8/20/2020 . Patient  has history of CVA in 2000 06, PFO, mild CAD by left heart catheterization March 3923, mild LV systolic dysfunction, hypertension, hyperlipidemia, right axillary artery stenosis secondary to thoracic outlet syndrome, thoracic outlet syndrome, hypothyroidism, diverticulosis and colitis, hiatal hernia and GERD, chronic back pain, mild obstructive sleep apnea and depression. Patient had discussion with Dr. Gia Leon regarding PFO closure but decided not to close it or after. Yesterday at about 1:00 PM while eating she developed a sensation of choking with severe shortness of breath and according to patient she ran outside on the porch to ask for help and lost consciousness. When she regained consciousness she called for ambulance. During her transportation by EMS to emergency room she developed a left-sided chest pain which she described as pressure of about 5/10, not radiating which resolved after application of Nitropaste in the EMS. In generally she has episodic left-sided sensation of pressure of short duration about 2 to 3 minutes they are not related to physical effort, of low intensity about 2 -3/10 and there is no radiation, they resolve spontaneously. .    During the exam: patient was resting comfortable without any signs of distress; was cooperative;  complained of chest soreness;  denied shortness of breath at rest, palpitations , lightheadedness, dizziness, cough, chills, fever, abdominal pains , nausea  and general tiredness.     Recently, in 2020 she has  had throughout its course. The diagonal system is moderate in caliber, and has luminal irregularities throughout its course. The LCx is long in length and large in caliber. There is very mild disease of about 20% in the proximal segment before its bifurcation. The AV groove branch is small in caliber. The obtuse marginal is large in caliber with a 20% lesion in its proximal segment. The RCA is long in length and large in caliber. There is another mild 20-30% lesion in the [facility] segment. The PDA and PLB are both of large caliber. They have luminal irregularities throughout their course. The LVEDP was 11 mmHg. Ejection fraction 50%. 17. Diverticulosis and colitis  18. GERD and hiatal hernia   19. Osteoarthritis  20. Remote tobacco abuse  21. Sleep study 04/16/2018.  Mild obstructive sleep apnea. 22. SARAH, 7/9/2018, Dr. Jennifer Espinoza, low normal systolic function, no left atrial thrombus, PFO with mild degree of left-to-right shunt, mild MR and AR, mild to moderate TR.  23. Lexiscan stress test 6/03/2020: The gated SPECT stress imaging in the short, vertical long, and horizontal long axis demonstrated moderate size anterior defect of moderate intensity.  The defect was reversible at rest. Gated images revealed anterior wall severe hypokinesis with an ejection   fraction 47%. 24. Left heart cath 6/23/2020: CORONARY ANATOMY: Left main:  It is a large and short artery with no angiographic stenosis noted. LAD:  It is a large artery wrapping around the apex and giving rise to three diagonal branch branches and several septal perforators.  There was 20 to 30% discrete mid LAD stenosis with probably 50% ostial discrete second diagonal branch stenosis.  The first diagonal was very small.  The second diagonal was fair in size and the third diagonal was              smaller. Left circumflex:  It is a large artery giving rise to a large bifurcating  obtuse marginal branch  and continues to the AV groove.  There was 30 to 40%    discrete MCG/ACT AERO Inhale 2 puffs into the lungs daily   Yes Historical Provider, MD   sucralfate (CARAFATE) 1 GM tablet Take 1 tablet by mouth 2 times daily 3/23/21 6/21/21 Yes Pamela Hernandez DO   carvedilol (COREG) 3.125 MG tablet Take 1 tablet by mouth 2 times daily (with meals) 3/8/21 6/6/21 Yes Pamela Hernandez DO   isosorbide mononitrate (IMDUR) 30 MG extended release tablet Take 1 tablet by mouth daily 2/16/21  Yes Wilmar Merida MD   simvastatin (ZOCOR) 20 MG tablet Take 1 tablet by mouth nightly 1/25/21 4/25/21 Yes Pamela Hernandez DO   sertraline (ZOLOFT) 50 MG tablet Take 1 tablet by mouth nightly 1/11/21  Yes Pameal Hernandez DO   levothyroxine (SYNTHROID) 100 MCG tablet Take 1 tablet by mouth Daily 1/11/21  Yes Pamela Hernandez DO   omeprazole (PRILOSEC) 20 MG delayed release capsule Take 20 mg by mouth 2 times daily    Yes Historical Provider, MD   mesalamine (LIALDA) 1.2 g EC tablet Take 1,200 mg by mouth daily (with breakfast)  3/28/20  Yes Historical Provider, MD   nitroGLYCERIN (NITROSTAT) 0.4 MG SL tablet Place 1 tablet under the tongue every 5 minutes as needed for Chest pain 11/11/19  Yes Pamela Hernandez DO   aspirin 81 MG EC tablet Take 1 tablet by mouth daily 3/14/18  Yes IZZY Trevino - CNS   ascorbic acid (VITAMIN C) 500 MG tablet Take 500 mg by mouth daily. Yes Historical Provider, MD   Multiple Vitamins-Minerals (CENTRUM) TABS Take 1 tablet by mouth daily    Yes Historical Provider, MD       Current Medications:    Current Facility-Administered Medications: sodium chloride flush 0.9 % injection 10 mL, 10 mL, Intravenous, PRN  0.9 % sodium chloride infusion, 25 mL, Intravenous, PRN    Allergies:  Biaxin [clarithromycin], Codeine, Neosporin [bacitracin-neomycin-polymyxin], Sulfa antibiotics, Clarithromycin, Neomycin-polymyxin-gramicidin, Septra [sulfamethoxazole-trimethoprim], Vioxx [rofecoxib], and Sulfasalazine    Social History: Stopped smoking about 20 years ago per patient.   Denies consumption of alcohol. Denies use of illicit drugs. Social History     Socioeconomic History    Marital status:       Spouse name: Not on file    Number of children: Not on file    Years of education: Not on file    Highest education level: Not on file   Occupational History    Not on file   Social Needs    Financial resource strain: Not on file    Food insecurity     Worry: Not on file     Inability: Not on file    Transportation needs     Medical: Not on file     Non-medical: Not on file   Tobacco Use    Smoking status: Former Smoker     Packs/day: 3.00     Years: 24.00     Pack years: 72.00     Types: Cigarettes     Start date: 66     Quit date: 1999     Years since quittin.2    Smokeless tobacco: Never Used    Tobacco comment: quit 17 yerars ago   Substance and Sexual Activity    Alcohol use: No     Alcohol/week: 0.0 standard drinks     Comment: denies caffeine    Drug use: No    Sexual activity: Not Currently   Lifestyle    Physical activity     Days per week: Not on file     Minutes per session: Not on file    Stress: Not on file   Relationships    Social connections     Talks on phone: Not on file     Gets together: Not on file     Attends Cheondoism service: Not on file     Active member of club or organization: Not on file     Attends meetings of clubs or organizations: Not on file     Relationship status: Not on file    Intimate partner violence     Fear of current or ex partner: Not on file     Emotionally abused: Not on file     Physically abused: Not on file     Forced sexual activity: Not on file   Other Topics Concern    Not on file   Social History Narrative    Not on file       Family History:   Family History   Problem Relation Age of Onset    Coronary Art Dis Mother         s/p CABG    Alzheimer's Disease Mother     Heart Disease Brother 72        ACB         REVIEW OF SYSTEMS:     Constitutional: Denies fatigue, fevers, chills, night sweats, latrice loss, latrice gain  HEENT: Denies headaches, nose bleeds, and blurred vision,oral pain, oral lesion. Neurological:history of stroke,  Denies  weakness, dizziness and lightheadedness, numbness and tingling  Cardiovascular: Episodic chest pain. Denies SOB at effort, SOB at rest, SOB when lying flat,  palpitations, and feelings of heart racing. Respiratory: Denies cough, wheezing,  use of supplementary oxygen, CPAP/BiPAP  Gastrointestinal: Denies heartburn, nausea, vomiting, diarrhea and constipation, black/bloody, and tarry stools. Genitourinary:  Denies pain on  urination,  blood in urine, trouble starting urination, need to strain on urination, urinary urgency, urinary incontinence. Hematologic:  Denies history of easy bruising, prolonged bleeding,  of blood clots in legs  Lymphatic: Denies lumps and bumps to neck, axilla, breast, and groin  Endocrine: Denies excessive thirst. Denies intolerance to heat or cold  Musculoskeletal: Denies falls, pain to BLE with ambulation and edema to BLE. Psychiatric: Has a low yes R ER yes yes yes yes yes yes thanks for by forgot to tell you that everything was started anxiety and depression. PHYSICAL EXAM:   /70   Pulse 85   Temp 96 °F (35.6 °C) (Temporal)   Resp 18   Ht 4' 11\" (1.499 m)   Wt 185 lb (83.9 kg)   SpO2 98%   BMI 37.37 kg/m²   CONST: Obese. Awake, alert, cooperative, no apparent distress  HEENT:   Head- Normocephalic, atraumatic   Eyes- Conjunctivae pink, anicteric  Throat- Oral mucosa pink and moist  Neck-  No stridor, trachea midline, no jugular venous distention. No adenopathy   CHEST: Chest symmetrical and non-tender to palpation. No accessory muscle use or intercostal retractions  RESPIRATORY:  Lung sounds - clear throughout fields  CARDIOVASCULAR:     No carotid bruits  Heart Inspection- shows no noted pulsations  Heart Palpation- no heaves or thrills; PMI is non-displaced   Heart Ausculation- Regular rate and rhythm, no murmur.  No s3, s4 or rub PV: Right radial pulse not felt on palpation, left radial pulse +2. No lower extremity edema. No varicosities. Pedal pulses palpable, no clubbing or cyanosis   ABDOMEN: Soft, non-tender to light palpation. Bowel sounds present. MS: Moves all extremities Good muscle strength and tone. No atrophy or abnormal movements. : Deferred  SKIN: Warm and dry,  no stasis dermatitis or ulcers on legs  NEURO / PSYCH: Oriented to person, place and time. Speech clear and appropriate. Follows all commands. Pleasant affect     DATA:    ECG 4/1/2021Normal sinus rhythm  Left ventricular hypertrophy with repolarization abnormality  Nonspecific ST and T wave abnormality  Prolonged QT  Abnormal ECG  When compared with ECG of 19-AUG-2020 16:15,  Significant changes have occurred  Tele strips: SR  Diagnostic:  Chest x Ray 4/2/2021:Cardiomegaly with no evidence of failure or acute infiltrates. CTA chest without contrast on 4/2/2021; Impression:  1. Mildly motion limited evaluation with no evidence of acute pulmonary  embolus to the segmental level. 2. Small focal ground-glass nodules in the right middle and lower lobe which  measure up to 11 mm are nonspecific but favored to be infectious in nature. Please see follow-up recommendations below. 3. Stable enlargement of the main pulmonary artery which can be seen in the  setting of pulmonary hypertension.     RECOMMENDATIONS:  11 mm right ground-glass pulmonary nodule. Recommend a non-contrast Chest CT  at 6 months to confirm persistence, then additional non-contrast Chest CTs  every 2 years until 5 years. If nodule grows or develops solid component(s),  consider resection. These guidelines do not apply to immunocompromised patients and patients with  cancer. Follow up in patients with significant comorbidities as clinically  warranted. For lung cancer screening, adhere to Lung-RADS guidelines. Reference: Radiology. 2017; 284(1):228-43.       Labs:   CBC:   Recent Labs 04/02/21 0023   WBC 11.4   HGB 11.0*   HCT 34.8        BMP:   Recent Labs     04/02/21 0023      K 4.4   CO2 27   BUN 21   CREATININE 0.7   LABGLOM >60   CALCIUM 9.3     PT/INR:   Recent Labs     04/02/21 0023   PROTIME 10.9   INR 1.0     APTT:No results for input(s): APTT in the last 72 hours. CARDIAC ENZYMES:  Recent Labs     04/02/21 0023   TROPONINI 0.03     FASTING LIPID PANEL:  Lab Results   Component Value Date    CHOL 170 01/11/2021    HDL 51 01/11/2021    LDLCALC 99 01/11/2021    TRIG 99 01/11/2021     LIVER PROFILE:  Recent Labs     04/02/21 0023   AST 46*   ALT 32   LABALBU 4.5           ASSESSMENT:  1. CAD, s/p PCI 2011(record not salable), Heart cath on 6/23/2020 by Dr. Orlando Gomez -reviewed below  2. Stress test 6/3/2020- evidence of reversible anterior ischemia. Ejection fraction 47%  3. CP, troponin -negative, no significant changes on ECG   4. Syncope-likely vasovagal  5. Hypertension-BP controlled  6. Dyslipidemia  7. PFO, history of refusal by patient of intervention  8. Right axillary artery stenosis secondary to thoracic outlet syndrome  9. Hypothyroidism  10. Prolonged QT interval    11. Obesity    PLAN:  1. Continue Telemetry  2. Limited ECHO as for syncope work-up  3. Chemical stress test-reviewed below  1. Coronary angiography and revascularization as needed on Monday via femoral access  4. Coreg increase to 6.25 mg two times a day  5. Change simvastatin to rosuvastatin 20 mg daily  6. Continue aspirin  7. We will follow peripherally over the weekend. Please call us with any questions or concerns. Assessment and plan discussed with Dr. Silvia Mendez MD       Electronically signed by JERARDO Mata on 4/2/2021 at 9:55 AM     PHYSICIAN ADDENDUM:  I independently interviewed and examined the patient. I have reviewed the above documentation completed by the SANTIAGO.  Please see my additional contributions to the HPI, physical exam, and assessment / medical decision making. I independently reviewed the HPI, ROS, PMH, PSH, [address], SH, and medications independently and agree with above documentation. I discussed the assessment and plan with the patient/family at the bedside as well as the bedside nurse and the primary team.    · Personally reviewed her coronary angiogram from June 2020: No significant disease in the LAD or circumflex. Mid RCA has 50% stenosis with an IFR of 0.92.  · Lexiscan MPS again shows multiple perfusion defects. Unfortunately we will have to repeat coronary angiography on Monday to rule out progression of her coronary disease. Of note she has axillary stenosis reportedly related to thoracic outlet syndrome and femoral access should be used.       Bill Silver MD, [facility]  Interventional Cardiology/Structural Heart Disease  Office: [phone]

## 2021-04-02 NOTE — CARE COORDINATION
Met with patient at bedside to discuss transition of care. She lives alone in a 1 story home. Her son lives next door and checks in on her. She stated she is independent with ADL's and driving. No assistive devices. No Cpap, home O2 or nebulizer. PCP . Pharmacy [address] [place] [address]. Pt is planning to return home at discharge with no anticipated needs. If DME needed at discharge, no preference on company. Declines list. CM will continue to follow for any needs that arise      Hertford Riding BSN, RN  [facility] Case Management  [phone]      The Plan for Transition of Care is related to the following treatment goals: Discharge planning when medically stable    The Patient  was provided with a choice of provider and agrees   with the discharge plan. [x] Yes [] No    Freedom of choice list was provided with basic dialogue that supports the patient's individualized plan of care/goals, treatment preferences and shares the quality data associated with the providers.  [x] Yes [] No

## 2021-04-02 NOTE — PROCEDURES
Pharmacologic Nuclear Stress Test    Date: 4/2/2021    Indication: Chest pain    Description of procedure:    Protocol: Regadenoson stress SPECT myocardial perfusion imaging    Baseline EKG: NSR 80 bpm, normal axis. Nonspecific ST/T changes. QTc 482 ms. Baseline BP: 120/76 mmHg    0.4 mg of regadenoson was injected followed by radiotracer injection. Patient reported no chest pain. Stress EKG showed no evidence of ischemia, and no arrhythmias were noted. Impression:  1. No evidence of regadenoson induced ischemia on stress EKG. 2. Nuclear images to be reported separately.     Clarke Richard MD, [facility] Cardiology

## 2021-04-02 NOTE — H&P
[address]                  [address] [facility],  [facility]                              HISTORY AND PHYSICAL    PATIENT NAME: Darling Lindsay                 :        1950  MED REC NO:   [id number]                            ROOM:       4509  ACCOUNT NO:   [de-identified]                           ADMIT DATE: 2021  PROVIDER:     Sheyla Collins DO    CHIEF COMPLAINT:  Shortness of breath, chest pain with collapse. HISTORY OF CHIEF COMPLAINT:  This is a 61-year-old female who presents  to the emergency room with shortness of breath. The patient states that  it was persistent, moderate in severity, and that seem to make it worse  or better. The patient was eating an egg, started choking on it, went  to call for help. She then had a syncopal episode, woke up by herself,  and  was called. She does have a history of coronary artery  disease and a nitroglycerin given to her by EMS en route for her chest  pain, did resolve the pain. She describes the pain is midsternal and  left-sided without radiation. No nausea. No vomiting. No shortness of  breath. Did not break out in sweat. The patient was reportedly hypoxic  with a pulse ox of 89% on room air while en route. The patient believes  that she did pass out at home. She is also reporting some lower back  pain, but no neck pain. There has been no abdominal pain. No nausea or  vomiting. She does have a history of tracheomalacia, which she sees Dr. Jennie Crockett for. She had a bronchoscopy with the idea of putting a stent in  summer of 2020, it did not proceed accordingly. Workup in the ER showed that she was afebrile. White count 11.4. Troponin 0.03. Metabolic panel unremarkable except for a sugar of 157. Her alk phos was 110, AST 46. Her proBNP was 592. CT scan of head  without contrast showed no acute abnormalities.   CT scan of the lumbar  spine showed no acute posttraumatic abnormalities seen. She has an  L5-S1 facet arthropathy with L5 grade 1 spondylolisthesis and mild disk  bulging, causing mild spinal stenosis. CTA of the chest showed no  evidence of acute pulmonary embolism. She had some small focal  ground-glass nodules in the right middle and lower lobes, but  nonspecific. The patient will be admitted under my service with  consults to Cardiology, [facility] Cardiology regarding her chest pain with  syncope. She has a history of coronary artery disease and nonischemic  cardiomyopathy. We will also obtain a surgical consult with Dr. Kemi Alicia regarding her dysphagia and choking sensation. The patient had  seen and follows with Dr. Kemi Alicia as an outpatient, her last  visit with him was about 2 months ago. She offers no other complaints  at this time. PAST MEDICAL HISTORY:  1. Hypertension. 2.  Hyperlipidemia. 3.  Hypothyroidism. 4.  History of thoracic outlet syndrome. 5.  Hiatal hernia. 6.  History of CVA. 7.  Coronary artery disease. 8.  Obesity. 9.  Nonischemic cardiomyopathy. 10.  Obstructive sleep apnea. 11.  PFO (patent foramen ovale). 12.  Tracheomalacia. 13.  Depression, recurrent in nature. PAST SURGICAL HISTORY:  1. Heart catheterization in 1988, 1992, 2001, and 2005. 2.  Tonsillectomy. 3.  Hysterectomy. 4.  Bilateral lower extremity leg vein ligation. 5.  Lithotripsy. 6.  C-scope. 7.  Heart catheterization 2018 with no significant coronary artery  disease. 8.  Bronchoscopy on 08/21/2020 per Dr. Lyssa Segal. ALLERGIES:  She has allergy to,  1. BIAXIN. 2.  CODEINE. 3.  NEOSPORIN. 4.  SULFA. 5.  TRIPLE ANTIBIOTIC. 6.  NEOMYCIN. 7.  SEPTRA. 8.  VIOXX. 9.  _____.     MEDICATIONS PRIOR TO ADMISSION:  Flonase 2 sprays each nostril daily,  Bevespi _____ 2 puffs inhalation daily, Carafate 1 gm by mouth twice a  day, Coreg 3.125 mg one by mouth twice a day, Imdur 30 mg one daily,  Zocor 20 mg one daily, Zoloft 50 mg daily, Synthroid 100 mcg daily,  omeprazole 20 mg by mouth twice a day, Lialda 1200 mg one by mouth  daily, aspirin 81 mg daily, vitamin C 500 mg daily, multivitamin one  daily. SOCIAL HISTORY:  Former smoker. She quit back in . Nondrinker. No  history of illicit drug use. The patient does not vape. Occupation,  unemployed. She is . FAMILY HISTORY:  Both parents are . Mom had a history of  coronary artery disease and dementia, and dad is , unknown  cause. She has one brother alive, with heart disease, has a history of  bypass at age 72. Two sisters who are alive with no known medical  problems. REVIEW OF SYSTEMS:  As mentioned in chief complaint, otherwise  unremarkable. PHYSICAL EXAMINATION:  VITAL SIGNS:  On admission, temperature 96, pulse 100, respirations 20,  blood pressure 169/111, pulse ox 97% on 2 liters per nasal cannula. Height is 4 feet 11 inches, weight of 185, BMI is 37.37. GENERAL:  Alert and oriented x3, appears to be in no acute distress. SKIN:  Adequate turgor. No tenting. No rash or wounds. It is dry and  intact. HEAD:  Normocephalic, atraumatic. EYES:  Pupils equal and reactive to light. Extraocular muscles intact  bilaterally. THROAT:  Mild postnasal drainage without erythema and exudates. NECK:  Supple without adenopathy. No carotid bruit was appreciated upon  auscultation. CARDIOVASCULAR:  Regular rate and rhythm without murmurs, rubs, or  gallops. LUNGS:  Decreased breath sounds in the bilateral bases. No obvious  rales or rhonchi. ABDOMEN:  Obese, soft, nontender, nondistended. Good bowel sounds  throughout. No masses, no organomegaly, no bruit. EXTREMITIES:  No clubbing, cyanosis, edema noted. NEUROLOGICAL:  Intact. No focal deficits. Cranial nerves II through  XII grossly intact. MUSCULOSKELETAL:  Appropriate for above-stated age. LABORATORY DATA:  Metabolic panel was unremarkable except for sugar of  157.   Her

## 2021-04-02 NOTE — ED PROVIDER NOTES
HPI:  4/1/21, Time: 11:49 PM EDT         Santiago Wells is a 72 y.o. female presenting to the ED for shortness of breath, beginning hours ago. The complaint has been persistent, moderate in severity, and worsened by nothing. Patient presenting here because of shortness of breath. Patient reports symptoms started after she ate a hard boiled egg. Patient reporting history of coronary disease she developed chest pains while being brought here by EMS. Patient reporting pain in her mid chest going to her back underneath her left breast.  Patient reporting improvement of symptoms after being treated by EMS. Patient reportedly was hypoxic at home pulse ox is 89%. Patient reports that she believes she passed out at home. Patient reporting lower back pain. Patient reporting no neck pain. Patient reporting no leg pain there is no abdominal pain there is no vomiting. Patient does have history of tracheomalacia     ROS:   Pertinent positives and negatives are stated within HPI, all other systems reviewed and are negative.  --------------------------------------------- PAST HISTORY ---------------------------------------------  Past Medical History:  has a past medical history of Arthritis, CAD (coronary artery disease), Cerebrovascular disease, Chronic back pain, Congenital heart disease, CVA (cerebral vascular accident) ([address] Depression, Fracture of right shoulder, Frequent UTI, GERD (gastroesophageal reflux disease), H/O ligation of vein, Hiatal hernia, History of thoracic outlet syndrome, Hypercholesterolemia, Hypertension, Hypothyroidism, Kidney stones, Nephrolithiasis, Nonischemic cardiomyopathy ([address] ROSALVA (obstructive sleep apnea), Osteoarthritis, PFO (patent foramen ovale), Pneumonia, and Thyroid disease. Past Surgical History:  has a past surgical history that includes Diagnostic Cardiac Cath Lab Procedure (5/17/1988); Diagnostic Cardiac Cath Lab Procedure (6/10/1992);  Diagnostic Cardiac Cath Lab Procedure (10/26/2001); Diagnostic Cardiac Cath Lab Procedure (12/7/2005); Tonsillectomy; Middle ear surgery (1987); Hysterectomy (1983); vascular surgery; Lithotripsy; Urethra surgery; Colonoscopy (11/04); Colonoscopy (09/17/2013); Colonoscopy (12/30/2016); Cardiac catheterization (03/05/2018); and bronchoscopy (N/A, 8/21/2020). Social History:  reports that she quit smoking about 22 years ago. Her smoking use included cigarettes. She started smoking about 46 years ago. She has a 72.00 pack-year smoking history. She has never used smokeless tobacco. She reports that she does not drink alcohol or use drugs. Family History: family history includes Alzheimer's Disease in her mother; Coronary Art Dis in her mother; Heart Disease (age of onset: 72) in her brother. The patients home medications have been reviewed. Allergies: Biaxin [clarithromycin], Codeine, Neosporin [bacitracin-neomycin-polymyxin], Sulfa antibiotics, Clarithromycin, Neomycin-polymyxin-gramicidin, Septra [sulfamethoxazole-trimethoprim], Vioxx [rofecoxib], and Sulfasalazine    ---------------------------------------------------PHYSICAL EXAM--------------------------------------    Constitutional/General: Alert and oriented x3, mild distress  Head: Normocephalic and atraumatic  Eyes: PERRL, EOMI  Mouth: Oropharynx clear, handling secretions, no trismus  Neck: Supple, full ROM, non tender to palpation in the midline, no stridor, no crepitus, no meningeal signs  Pulmonary: Lungs clear to auscultation bilaterally, no wheezes, rales, or rhonchi. Not in respiratory distress  Cardiovascular:  Regular rate. Regular rhythm. No murmurs, gallops, or rubs. 2+ distal pulses  Chest: no chest wall tenderness  Abdomen: Soft. Non tender. Non distended. +BS. No rebound, guarding, or rigidity. No pulsatile masses appreciated. Musculoskeletal: Moves all extremities x 4. Warm and well perfused, no clubbing, cyanosis, or edema.  Capillary refill <3 Phosphatase 110 (H) 35 - 104 U/L    ALT 32 0 - 32 U/L    AST 46 (H) 0 - 31 U/L   Brain Natriuretic Peptide   Result Value Ref Range    Pro- (H) 0 - 125 pg/mL   Protime-INR   Result Value Ref Range    Protime 10.9 9.3 - 12.4 sec    INR 1.0    EKG 12 Lead   Result Value Ref Range    Ventricular Rate 96 BPM    Atrial Rate 96 BPM    P-R Interval 174 ms    QRS Duration 94 ms    Q-T Interval 394 ms    QTc Calculation (Bazett) 497 ms    P Axis 35 degrees    R Axis 7 degrees    T Axis 79 degrees       RADIOLOGY:  Interpreted by Radiologist.  CT HEAD WO CONTRAST   Preliminary Result   No acute intracranial abnormality. CT LUMBAR SPINE WO CONTRAST   Final Result   No acute post-traumatic abnormality seen. L5-S1 facet arthropathy with L5 grade 1 spondylolisthesis and mild disc   bulge. Findings cause mild spinal stenosis. CTA CHEST W CONTRAST   Preliminary Result   1. Mildly motion limited evaluation with no evidence of acute pulmonary   embolus to the segmental level. 2. Small focal ground-glass nodules in the right middle and lower lobe are   nonspecific but favored to be infectious in nature. Please see follow-up   recommendations below. 3. Stable enlargement of the main pulmonary artery which can be seen in the   setting of pulmonary hypertension. RECOMMENDATIONS:   11 mm right ground-glass pulmonary nodule. Recommend a non-contrast Chest CT   at 6 months to confirm persistence, then additional non-contrast Chest CTs   every 2 years until 5 years. If nodule grows or develops solid component(s),   consider resection. These guidelines do not apply to immunocompromised patients and patients with   cancer. Follow up in patients with significant comorbidities as clinically   warranted. For lung cancer screening, adhere to Lung-RADS guidelines. Reference: Radiology. 2017; 284(1):228-43.          XR CHEST PORTABLE   Final Result   Cardiomegaly with no evidence of failure or acute infiltrates. EKG:  This EKG is signed and interpreted by me. Rate: 96  Rhythm: Sinus  Interpretation: non-specific EKG  Comparison: stable as compared to patient's most recent EKG          ------------------------- NURSING NOTES AND VITALS REVIEWED ---------------------------   The nursing notes within the ED encounter and vital signs as below have been reviewed by myself. /66   Pulse 84   Temp 96 °F (35.6 °C) (Temporal)   Resp 12   Ht 4' 11\" (1.499 m)   Wt 185 lb (83.9 kg)   SpO2 100%   BMI 37.37 kg/m²   Oxygen Saturation Interpretation: reviewed    The patients available past medical records and past encounters were reviewed. ------------------------------ ED COURSE/MEDICAL DECISION MAKING----------------------  Medications   sodium chloride flush 0.9 % injection 10 mL (10 mLs Intravenous Given 4/2/21 8873)   0.9 % sodium chloride infusion (has no administration in time range)   0.9 % sodium chloride bolus (0 mLs Intravenous Stopped 4/2/21 4962)   ipratropium-albuterol (DUONEB) nebulizer solution 1 ampule (1 ampule Inhalation Given 4/2/21 9296)   iopamidol (ISOVUE-370) 76 % injection 60 mL (60 mLs Intravenous Given 4/2/21 2163)             Medical Decision Making:   Patient presenting here because of shortness of breath. Patient reports that started after eating hard boiled egg. Patient then developed chest pain in route here. Patient was medicated with aspirin nitro. Patient did have improvement of chest pain. Patient believes she did pass out prior to EMS arrival and she was noted to be hypoxic at home. Patient reporting she had her Covid vaccine shot about a week ago. Patient on supplemental oxygen no respiratory distress. Patient doing breathing treatments here in the emergency department. Patient labs noted reviewed as well as CT. Patient will be admitted to monitored bed     Re-Evaluations:             Re-evaluation.   Patients symptoms show no change  Patient reevaluated and made aware of findings she did report to me though that she did pass out at home she believes when she was having shortness of breath episode. Patient did injure her back she reports no neck pain she reports she does not know whether she hit her head. Patient underwent CTs and CTs reviewed CT chest also noted. Covid testing was ordered. Patient currently having no chest pains after being given Nitropaste. Consultations:           Internal medicine    Critical Care: This patient's ED course included: a personal history and physicial eaxmination    This patient has remained hemodynamically stable during their ED course. Counseling: The emergency provider has spoken with the patient and discussed todays results, in addition to providing specific details for the plan of care and counseling regarding the diagnosis and prognosis. Questions are answered at this time and they are agreeable with the plan.       --------------------------------- IMPRESSION AND DISPOSITION ---------------------------------    IMPRESSION  1. Chest pain, unspecified type    2. Shortness of breath    3. Syncope and collapse        DISPOSITION  Disposition: Admit to telemetry  Patient condition is stable        NOTE: This report was transcribed using voice recognition software.  Every effort was made to ensure accuracy; however, inadvertent computerized transcription errors may be present          Madeline Panda MD  04/02/21 0023       Madeline Panda MD  04/02/21 9169

## 2021-04-02 NOTE — PROGRESS NOTES
Waiting for patient's medications from pharmacy. Patient being transported to stress test. Will perform medication administration on her return to unit.      Hermelinda Foley RN, BSN

## 2021-04-03 PROCEDURE — 6370000000 HC RX 637 (ALT 250 FOR IP): Performed by: INTERNAL MEDICINE

## 2021-04-03 PROCEDURE — 94640 AIRWAY INHALATION TREATMENT: CPT

## 2021-04-03 PROCEDURE — 6360000002 HC RX W HCPCS: Performed by: NEUROMUSCULOSKELETAL MEDICINE & OMM

## 2021-04-03 PROCEDURE — G0378 HOSPITAL OBSERVATION PER HR: HCPCS

## 2021-04-03 PROCEDURE — 6370000000 HC RX 637 (ALT 250 FOR IP): Performed by: NEUROMUSCULOSKELETAL MEDICINE & OMM

## 2021-04-03 PROCEDURE — 2580000003 HC RX 258: Performed by: RADIOLOGY

## 2021-04-03 RX ADMIN — IPRATROPIUM BROMIDE 0.5 MG: 0.5 SOLUTION RESPIRATORY (INHALATION) at 16:42

## 2021-04-03 RX ADMIN — CARVEDILOL 6.25 MG: 6.25 TABLET, FILM COATED ORAL at 08:37

## 2021-04-03 RX ADMIN — ISOSORBIDE MONONITRATE 30 MG: 30 TABLET ORAL at 08:37

## 2021-04-03 RX ADMIN — SERTRALINE 50 MG: 50 TABLET, FILM COATED ORAL at 20:51

## 2021-04-03 RX ADMIN — ARFORMOTEROL TARTRATE 15 MCG: 15 SOLUTION RESPIRATORY (INHALATION) at 08:02

## 2021-04-03 RX ADMIN — ASPIRIN 81 MG: 81 TABLET, COATED ORAL at 08:37

## 2021-04-03 RX ADMIN — IPRATROPIUM BROMIDE 0.5 MG: 0.5 SOLUTION RESPIRATORY (INHALATION) at 12:36

## 2021-04-03 RX ADMIN — Medication 10 ML: at 20:51

## 2021-04-03 RX ADMIN — SUCRALFATE 1 G: 1 TABLET ORAL at 16:59

## 2021-04-03 RX ADMIN — IPRATROPIUM BROMIDE 0.5 MG: 0.5 SOLUTION RESPIRATORY (INHALATION) at 08:02

## 2021-04-03 RX ADMIN — CARVEDILOL 6.25 MG: 6.25 TABLET, FILM COATED ORAL at 16:59

## 2021-04-03 RX ADMIN — ROSUVASTATIN 20 MG: 20 TABLET, FILM COATED ORAL at 20:51

## 2021-04-03 NOTE — PROGRESS NOTES
Admit Date: 4/1/2021    Subjective: All notes reviewed feels fine no chest pain     Objective:     Patient Vitals for the past 8 hrs:   BP Temp Temp src Pulse Resp SpO2   04/03/21 1140 136/71 97.4 °F (36.3 °C) Temporal 75 18 94 %   04/03/21 0811 (!) 155/78 97.3 °F (36.3 °C) Temporal 72 18 93 %     No intake/output data recorded. No intake/output data recorded. HEENT: Normal  NECK: Thyroid normal. No carotid bruit. No lymphphadenopathy. CVS: RRR  RS: Clear. No wheeze. No rhonchi. Good airflow bilaterally. ABD: Soft. Non tender. No mass. Normal BS.obese   EXT: No edema. Non tender. Pulses present. Skin intact.   NEURO: Intact      Scheduled Meds:   ascorbic acid  500 mg Oral Daily    aspirin  81 mg Oral Daily    isosorbide mononitrate  30 mg Oral Daily    levothyroxine  100 mcg Oral Daily    mesalamine  1,200 mg Oral Daily with breakfast    therapeutic multivitamin-minerals  1 tablet Oral Daily    pantoprazole  40 mg Oral Daily    sertraline  50 mg Oral Nightly    sucralfate  1 g Oral BID    ipratropium  0.5 mg Nebulization 4x daily    And    Arformoterol Tartrate  15 mcg Nebulization BID    rosuvastatin  20 mg Oral Nightly    carvedilol  6.25 mg Oral BID WC     Continuous Infusions:   sodium chloride         CBC with Differential:    Lab Results   Component Value Date    WBC 11.4 04/02/2021    RBC 4.15 04/02/2021    HGB 11.0 04/02/2021    HCT 34.8 04/02/2021     04/02/2021    MCV 83.9 04/02/2021    MCH 26.5 04/02/2021    MCHC 31.6 04/02/2021    RDW 14.2 04/02/2021    SEGSPCT 91 04/22/2011    LYMPHOPCT 8.8 04/02/2021    MONOPCT 6.1 04/02/2021    BASOPCT 0.3 04/02/2021    MONOSABS 0.69 04/02/2021    LYMPHSABS 1.00 04/02/2021    EOSABS 0.22 04/02/2021    BASOSABS 0.03 04/02/2021     CMP:    Lab Results   Component Value Date     04/02/2021    K 4.4 04/02/2021    K 3.7 08/19/2020    CL 99 04/02/2021    CO2 27 04/02/2021    BUN 21 04/02/2021    CREATININE 0.7 04/02/2021    GFRAA >60

## 2021-04-03 NOTE — CONSULTS
DIAGNOSTIC CARDIAC CATH LAB PROCEDURE  12/7/2005    [facility].    HYSTERECTOMY  1983    LITHOTRIPSY      MIDDLE EAR SURGERY  1987    Eardrum replaced in right ear.     TONSILLECTOMY      URETHRA SURGERY      VASCULAR SURGERY      veil ligation kylee legs     Current Medications:   Current Facility-Administered Medications: sodium chloride flush 0.9 % injection 10 mL, 10 mL, Intravenous, PRN  0.9 % sodium chloride infusion, 25 mL, Intravenous, PRN  ascorbic acid (VITAMIN C) tablet 500 mg, 500 mg, Oral, Daily  aspirin EC tablet 81 mg, 81 mg, Oral, Daily  isosorbide mononitrate (IMDUR) extended release tablet 30 mg, 30 mg, Oral, Daily  levothyroxine (SYNTHROID) tablet 100 mcg, 100 mcg, Oral, Daily  mesalamine (LIALDA) EC tablet 1.2 g, 1,200 mg, Oral, Daily with breakfast  therapeutic multivitamin-minerals 1 tablet, 1 tablet, Oral, Daily  pantoprazole (PROTONIX) tablet 40 mg, 40 mg, Oral, Daily  sertraline (ZOLOFT) tablet 50 mg, 50 mg, Oral, Nightly  sucralfate (CARAFATE) tablet 1 g, 1 g, Oral, BID  ipratropium (ATROVENT) 0.02 % nebulizer solution 0.5 mg, 0.5 mg, Nebulization, 4x daily **AND** Arformoterol Tartrate (BROVANA) nebulizer solution 15 mcg, 15 mcg, Nebulization, BID  rosuvastatin (CRESTOR) tablet 20 mg, 20 mg, Oral, Nightly  perflutren lipid microspheres (DEFINITY) injection 1.65 mg, 1.5 mL, Intravenous, ONCE PRN  carvedilol (COREG) tablet 6.25 mg, 6.25 mg, Oral, BID WC  Allergies:  Biaxin [clarithromycin], Codeine, Neosporin [bacitracin-neomycin-polymyxin], Sulfa antibiotics, Clarithromycin, Neomycin-polymyxin-gramicidin, Septra [sulfamethoxazole-trimethoprim], Vioxx [rofecoxib], and Sulfasalazine    Social History:   High BMI  Family History:       Problem Relation Age of Onset    Coronary Art Dis Mother         s/p CABG    Alzheimer's Disease Mother     Heart Disease Brother 72        ACB       REVIEW OF SYSTEMS:    H&P    PHYSICAL EXAM:    VITALS:  BP (!) 155/78   Pulse 72   Temp 97.3 °F (36.3 °C) (Temporal)   Resp 18   Ht 4' 11\" (1.499 m)   Wt 185 lb (83.9 kg)   SpO2 93%   BMI 37.37 kg/m²   24HR INTAKE/OUTPUT:  No intake or output data in the 24 hours ending 21 1138  TEMPERATURE:  Current - Temp: 97.3 °F (36.3 °C);  Max - Temp  Av.9 °F (36.6 °C)  Min: 97.3 °F (36.3 °C)  Max: 98.8 °F (37.1 °C)  RESPIRATIONS RANGE: Resp  Av  Min: 16  Max: 20  PULSE RANGE: Pulse  Av.7  Min: 72  Max: 81  BLOOD PRESSURE RANGE:  Systolic (37ICU), DONTA:999 , Min:124 , UTH:975   ; Diastolic (93JCG), LYY:49, Min:69, Max:86    PULSE OXIMETRY RANGE: SpO2  Av.4 %  Min: 92 %  Max: 99 %  CONSTITUTIONAL:  fatigued, alert, cooperative, no apparent distress, appears older than stated age and morbidly obese  EYES:  lids and lashes normal, pupils equal, round and reactive to light, extra-ocular muscles intact and sclera clear  NECK:  supple, symmetrical, trachea midline, skin normal and no stridor  LUNGS:  Moderate respiratory distress, good air exchange, no retractions and clear to auscultation  CARDIOVASCULAR:  normal apical pulses, regular rate and rhythm and normal S1 and S2  ABDOMEN:  normal bowel sounds, soft, distended, non-tender, voluntary guarding absent and no masses palpated  DATA:    CBC with Differential:    Lab Results   Component Value Date    WBC 11.4 2021    RBC 4.15 2021    HGB 11.0 2021    HCT 34.8 2021     2021    MCV 83.9 2021    MCH 26.5 2021    MCHC 31.6 2021    RDW 14.2 2021    SEGSPCT 91 2011    LYMPHOPCT 8.8 2021    MONOPCT 6.1 2021    BASOPCT 0.3 2021    MONOSABS 0.69 2021    LYMPHSABS 1.00 2021    EOSABS 0.22 2021    BASOSABS 0.03 2021     CMP:    Lab Results   Component Value Date     2021    K 4.4 2021    K 3.7 2020    CL 99 2021    CO2 27 2021    BUN 21 2021    CREATININE 0.7 2021    GFRAA >60 2021    LABGLOM >60 2021 GLUCOSE 157 04/02/2021    GLUCOSE 105 04/22/2011    PROT 8.3 04/02/2021    LABALBU 4.5 04/02/2021    LABALBU 4.5 04/22/2011    CALCIUM 9.3 04/02/2021    BILITOT 0.2 04/02/2021    ALKPHOS 110 04/02/2021    AST 46 04/02/2021    ALT 32 04/02/2021       IMPRESSION/RECOMMENDATIONS: Sudden onset of SOB after eating a hard boiled egg  Cardiac work up negative so far for acute event  Dysphagia on and off long term  Tracheomalacia per pulmonary  Esophagogram ordered  Decide further work up post

## 2021-04-03 NOTE — PROCEDURES
Per Radiologist, MBS to be performed prior to Esophagram. Will schedule MBS at earliest possible time Mon 4/5 when Speech Therapy is available. Esophagram will follow pending results of MBS.

## 2021-04-04 LAB
C-REACTIVE PROTEIN: 1.7 MG/DL (ref 0–0.4)
FERRITIN: 227 NG/ML
HCT VFR BLD CALC: 33.3 % (ref 34–48)
IMMATURE RETIC FRACT: 13.8 % (ref 3–15.9)
PREALBUMIN: 22 MG/DL (ref 20–40)
RETIC HGB EQUIVALENT: 31.3 PG (ref 28.2–36.6)
RETICULOCYTE ABSOLUTE COUNT: 0.07 E12/L
RETICULOCYTE COUNT PCT: 1.8 % (ref 0.4–1.9)
SEDIMENTATION RATE, ERYTHROCYTE: 55 MM/HR (ref 0–20)
TRANSFERRIN: 211 MG/DL (ref 200–360)

## 2021-04-04 PROCEDURE — 84466 ASSAY OF TRANSFERRIN: CPT

## 2021-04-04 PROCEDURE — G0378 HOSPITAL OBSERVATION PER HR: HCPCS

## 2021-04-04 PROCEDURE — 36415 COLL VENOUS BLD VENIPUNCTURE: CPT

## 2021-04-04 PROCEDURE — 94640 AIRWAY INHALATION TREATMENT: CPT

## 2021-04-04 PROCEDURE — 86140 C-REACTIVE PROTEIN: CPT

## 2021-04-04 PROCEDURE — 6370000000 HC RX 637 (ALT 250 FOR IP): Performed by: INTERNAL MEDICINE

## 2021-04-04 PROCEDURE — 6370000000 HC RX 637 (ALT 250 FOR IP): Performed by: NEUROMUSCULOSKELETAL MEDICINE & OMM

## 2021-04-04 PROCEDURE — 6360000002 HC RX W HCPCS: Performed by: NEUROMUSCULOSKELETAL MEDICINE & OMM

## 2021-04-04 PROCEDURE — 2580000003 HC RX 258: Performed by: RADIOLOGY

## 2021-04-04 PROCEDURE — 85651 RBC SED RATE NONAUTOMATED: CPT

## 2021-04-04 PROCEDURE — 2700000000 HC OXYGEN THERAPY PER DAY

## 2021-04-04 PROCEDURE — 85045 AUTOMATED RETICULOCYTE COUNT: CPT

## 2021-04-04 PROCEDURE — 84134 ASSAY OF PREALBUMIN: CPT

## 2021-04-04 PROCEDURE — 82728 ASSAY OF FERRITIN: CPT

## 2021-04-04 RX ADMIN — LEVOTHYROXINE SODIUM 100 MCG: 0.1 TABLET ORAL at 08:55

## 2021-04-04 RX ADMIN — IPRATROPIUM BROMIDE 0.5 MG: 0.5 SOLUTION RESPIRATORY (INHALATION) at 07:58

## 2021-04-04 RX ADMIN — ISOSORBIDE MONONITRATE 30 MG: 30 TABLET ORAL at 08:55

## 2021-04-04 RX ADMIN — PANTOPRAZOLE SODIUM 40 MG: 40 TABLET, DELAYED RELEASE ORAL at 08:56

## 2021-04-04 RX ADMIN — CARVEDILOL 6.25 MG: 6.25 TABLET, FILM COATED ORAL at 18:41

## 2021-04-04 RX ADMIN — OXYCODONE HYDROCHLORIDE AND ACETAMINOPHEN 500 MG: 500 TABLET ORAL at 08:55

## 2021-04-04 RX ADMIN — MESALAMINE 1.2 G: 1.2 TABLET, DELAYED RELEASE ORAL at 09:12

## 2021-04-04 RX ADMIN — Medication 10 ML: at 08:56

## 2021-04-04 RX ADMIN — SERTRALINE 50 MG: 50 TABLET, FILM COATED ORAL at 20:32

## 2021-04-04 RX ADMIN — IPRATROPIUM BROMIDE 0.5 MG: 0.5 SOLUTION RESPIRATORY (INHALATION) at 12:01

## 2021-04-04 RX ADMIN — SUCRALFATE 1 G: 1 TABLET ORAL at 20:32

## 2021-04-04 RX ADMIN — IPRATROPIUM BROMIDE 0.5 MG: 0.5 SOLUTION RESPIRATORY (INHALATION) at 19:56

## 2021-04-04 RX ADMIN — CARVEDILOL 6.25 MG: 6.25 TABLET, FILM COATED ORAL at 08:55

## 2021-04-04 RX ADMIN — Medication 1 TABLET: at 08:55

## 2021-04-04 RX ADMIN — ROSUVASTATIN 20 MG: 20 TABLET, FILM COATED ORAL at 20:32

## 2021-04-04 RX ADMIN — ASPIRIN 81 MG: 81 TABLET, COATED ORAL at 08:55

## 2021-04-04 RX ADMIN — ARFORMOTEROL TARTRATE 15 MCG: 15 SOLUTION RESPIRATORY (INHALATION) at 07:58

## 2021-04-04 RX ADMIN — IPRATROPIUM BROMIDE 0.5 MG: 0.5 SOLUTION RESPIRATORY (INHALATION) at 15:42

## 2021-04-04 RX ADMIN — SUCRALFATE 1 G: 1 TABLET ORAL at 08:56

## 2021-04-04 RX ADMIN — ARFORMOTEROL TARTRATE 15 MCG: 15 SOLUTION RESPIRATORY (INHALATION) at 19:56

## 2021-04-04 ASSESSMENT — PAIN SCALES - GENERAL
PAINLEVEL_OUTOF10: 0
PAINLEVEL_OUTOF10: 0

## 2021-04-04 NOTE — PROGRESS NOTES
Admit Date: 4/1/2021    Subjective:     Doing fine no chest pain     Objective:     No data found. No intake/output data recorded. No intake/output data recorded. HEENT: Normal  NECK: Thyroid normal. No carotid bruit. No lymphphadenopathy. CVS: RRR  RS: Clear. No wheeze. No rhonchi. Good airflow bilaterally. ABD: Soft. Non tender. No mass. Normal BS. EXT: No edema. Non tender. Pulses present. Skin intact.   NEURO: Intact      Scheduled Meds:   ascorbic acid  500 mg Oral Daily    aspirin  81 mg Oral Daily    isosorbide mononitrate  30 mg Oral Daily    levothyroxine  100 mcg Oral Daily    mesalamine  1,200 mg Oral Daily with breakfast    therapeutic multivitamin-minerals  1 tablet Oral Daily    pantoprazole  40 mg Oral Daily    sertraline  50 mg Oral Nightly    sucralfate  1 g Oral BID    ipratropium  0.5 mg Nebulization 4x daily    And    Arformoterol Tartrate  15 mcg Nebulization BID    rosuvastatin  20 mg Oral Nightly    carvedilol  6.25 mg Oral BID WC     Continuous Infusions:   sodium chloride         CBC with Differential:    Lab Results   Component Value Date    WBC 11.4 04/02/2021    RBC 4.15 04/02/2021    HGB 11.0 04/02/2021    HCT 33.3 04/04/2021     04/02/2021    MCV 83.9 04/02/2021    MCH 26.5 04/02/2021    MCHC 31.6 04/02/2021    RDW 14.2 04/02/2021    SEGSPCT 91 04/22/2011    LYMPHOPCT 8.8 04/02/2021    MONOPCT 6.1 04/02/2021    BASOPCT 0.3 04/02/2021    MONOSABS 0.69 04/02/2021    LYMPHSABS 1.00 04/02/2021    EOSABS 0.22 04/02/2021    BASOSABS 0.03 04/02/2021     CMP:    Lab Results   Component Value Date     04/02/2021    K 4.4 04/02/2021    K 3.7 08/19/2020    CL 99 04/02/2021    CO2 27 04/02/2021    BUN 21 04/02/2021    CREATININE 0.7 04/02/2021    GFRAA >60 04/02/2021    LABGLOM >60 04/02/2021    PROT 8.3 04/02/2021    LABALBU 4.5 04/02/2021    LABALBU 4.5 04/22/2011    CALCIUM 9.3 04/02/2021    BILITOT 0.2 04/02/2021    ALKPHOS 110 04/02/2021    AST 46 04/02/2021

## 2021-04-04 NOTE — PROGRESS NOTES
Department of General Surgery - Adult  Surgical Service   Attending Progress Note      SUBJECTIVE:  Stable clinically much improved    OBJECTIVE  Elevated inflammatory index and abnormal imaging for cardiac cath tomorrow    Physical    VITALS:  BP (!) 99/51   Pulse 80   Temp 98.4 °F (36.9 °C) (Oral)   Resp 16   Ht 4' 11\" (1.499 m)   Wt 185 lb (83.9 kg)   SpO2 92%   BMI 37.37 kg/m²   TEMPERATURE:  Current - Temp: 98.4 °F (36.9 °C);  Max - Temp  Av.8 °F (36.6 °C)  Min: 97.4 °F (36.3 °C)  Max: 98.4 °F (36.9 °C)  RESPIRATIONS RANGE: Resp  Av.3  Min: 16  Max: 18  PULSE RANGE: Pulse  Av  Min: 75  Max: 80  BLOOD PRESSURE RANGE:  Systolic (63IKY), BKD:064 , Min:99 , MTJ:604   ; Diastolic (50ITM), FGN:04, Min:51, Max:71    PULSE OXIMETRY RANGE: SpO2  Av.3 %  Min: 92 %  Max: 94 %  CONSTITUTIONAL:  awake, alert, cooperative, no apparent distress, appears older than stated age and morbidly obese  EYES:  lids and lashes normal, pupils equal, round and reactive to light and extra-ocular muscles intact  NECK:  supple, symmetrical, trachea midline, skin normal and no stridor  LUNGS:  no increased work of breathing, good air exchange, no retractions and diminished breath sounds right base and left base  CARDIOVASCULAR:  normal apical pulses, regular rate and rhythm and normal S1 and S2  ABDOMEN:  normal bowel sounds, soft, distended, non-tender, voluntary guarding absent and no masses palpated  Data  CBC with Differential:    Lab Results   Component Value Date    WBC 11.4 2021    RBC 4.15 2021    HGB 11.0 2021    HCT 33.3 2021     2021    MCV 83.9 2021    MCH 26.5 2021    MCHC 31.6 2021    RDW 14.2 2021    SEGSPCT 91 2011    LYMPHOPCT 8.8 2021    MONOPCT 6.1 2021    BASOPCT 0.3 2021    MONOSABS 0.69 2021    LYMPHSABS 1.00 2021    EOSABS 0.22 2021    BASOSABS 0.03 2021     CMP:    Lab Results Component Value Date     04/02/2021    K 4.4 04/02/2021    K 3.7 08/19/2020    CL 99 04/02/2021    CO2 27 04/02/2021    BUN 21 04/02/2021    CREATININE 0.7 04/02/2021    GFRAA >60 04/02/2021    LABGLOM >60 04/02/2021    GLUCOSE 157 04/02/2021    GLUCOSE 105 04/22/2011    PROT 8.3 04/02/2021    LABALBU 4.5 04/02/2021    LABALBU 4.5 04/22/2011    CALCIUM 9.3 04/02/2021    BILITOT 0.2 04/02/2021    ALKPHOS 110 04/02/2021    AST 46 04/02/2021    ALT 32 04/02/2021       Current Inpatient Medications    Current Facility-Administered Medications: sodium chloride flush 0.9 % injection 10 mL, 10 mL, Intravenous, PRN  0.9 % sodium chloride infusion, 25 mL, Intravenous, PRN  ascorbic acid (VITAMIN C) tablet 500 mg, 500 mg, Oral, Daily  aspirin EC tablet 81 mg, 81 mg, Oral, Daily  isosorbide mononitrate (IMDUR) extended release tablet 30 mg, 30 mg, Oral, Daily  levothyroxine (SYNTHROID) tablet 100 mcg, 100 mcg, Oral, Daily  mesalamine (LIALDA) EC tablet 1.2 g, 1,200 mg, Oral, Daily with breakfast  therapeutic multivitamin-minerals 1 tablet, 1 tablet, Oral, Daily  pantoprazole (PROTONIX) tablet 40 mg, 40 mg, Oral, Daily  sertraline (ZOLOFT) tablet 50 mg, 50 mg, Oral, Nightly  sucralfate (CARAFATE) tablet 1 g, 1 g, Oral, BID  ipratropium (ATROVENT) 0.02 % nebulizer solution 0.5 mg, 0.5 mg, Nebulization, 4x daily **AND** Arformoterol Tartrate (BROVANA) nebulizer solution 15 mcg, 15 mcg, Nebulization, BID  rosuvastatin (CRESTOR) tablet 20 mg, 20 mg, Oral, Nightly  perflutren lipid microspheres (DEFINITY) injection 1.65 mg, 1.5 mL, Intravenous, ONCE PRN  carvedilol (COREG) tablet 6.25 mg, 6.25 mg, Oral, BID WC    ASSESSMENT AND PLAN Resolved chest discomfort  For Cath tomorrow  Esophageal evaluation to follow

## 2021-04-05 ENCOUNTER — APPOINTMENT (OUTPATIENT)
Dept: CARDIAC CATH/INVASIVE PROCEDURES | Age: 71
DRG: 287 | End: 2021-04-05
Payer: MEDICARE

## 2021-04-05 LAB
ABO/RH: NORMAL
ANTIBODY SCREEN: NORMAL

## 2021-04-05 PROCEDURE — 6360000002 HC RX W HCPCS

## 2021-04-05 PROCEDURE — 6360000002 HC RX W HCPCS: Performed by: NEUROMUSCULOSKELETAL MEDICINE & OMM

## 2021-04-05 PROCEDURE — 86923 COMPATIBILITY TEST ELECTRIC: CPT

## 2021-04-05 PROCEDURE — 2060000000 HC ICU INTERMEDIATE R&B

## 2021-04-05 PROCEDURE — 94640 AIRWAY INHALATION TREATMENT: CPT

## 2021-04-05 PROCEDURE — 36415 COLL VENOUS BLD VENIPUNCTURE: CPT

## 2021-04-05 PROCEDURE — 2709999900 HC NON-CHARGEABLE SUPPLY

## 2021-04-05 PROCEDURE — C1769 GUIDE WIRE: HCPCS

## 2021-04-05 PROCEDURE — 75710 ARTERY X-RAYS ARM/LEG: CPT | Performed by: SURGERY

## 2021-04-05 PROCEDURE — 4A023N7 MEASUREMENT OF CARDIAC SAMPLING AND PRESSURE, LEFT HEART, PERCUTANEOUS APPROACH: ICD-10-PCS | Performed by: INTERNAL MEDICINE

## 2021-04-05 PROCEDURE — 36246 INS CATH ABD/L-EXT ART 2ND: CPT | Performed by: SURGERY

## 2021-04-05 PROCEDURE — 6370000000 HC RX 637 (ALT 250 FOR IP): Performed by: INTERNAL MEDICINE

## 2021-04-05 PROCEDURE — 6370000000 HC RX 637 (ALT 250 FOR IP): Performed by: NEUROMUSCULOSKELETAL MEDICINE & OMM

## 2021-04-05 PROCEDURE — B41D1ZZ FLUOROSCOPY OF AORTA AND BILATERAL LOWER EXTREMITY ARTERIES USING LOW OSMOLAR CONTRAST: ICD-10-PCS | Performed by: INTERNAL MEDICINE

## 2021-04-05 PROCEDURE — C1894 INTRO/SHEATH, NON-LASER: HCPCS

## 2021-04-05 PROCEDURE — B2111ZZ FLUOROSCOPY OF MULTIPLE CORONARY ARTERIES USING LOW OSMOLAR CONTRAST: ICD-10-PCS | Performed by: INTERNAL MEDICINE

## 2021-04-05 PROCEDURE — 86901 BLOOD TYPING SEROLOGIC RH(D): CPT

## 2021-04-05 PROCEDURE — 93458 L HRT ARTERY/VENTRICLE ANGIO: CPT

## 2021-04-05 PROCEDURE — 2580000003 HC RX 258: Performed by: INTERNAL MEDICINE

## 2021-04-05 PROCEDURE — 86900 BLOOD TYPING SEROLOGIC ABO: CPT

## 2021-04-05 PROCEDURE — 36200 PLACE CATHETER IN AORTA: CPT

## 2021-04-05 PROCEDURE — P9016 RBC LEUKOCYTES REDUCED: HCPCS

## 2021-04-05 PROCEDURE — B2151ZZ FLUOROSCOPY OF LEFT HEART USING LOW OSMOLAR CONTRAST: ICD-10-PCS | Performed by: INTERNAL MEDICINE

## 2021-04-05 PROCEDURE — 2500000003 HC RX 250 WO HCPCS

## 2021-04-05 PROCEDURE — 86850 RBC ANTIBODY SCREEN: CPT

## 2021-04-05 PROCEDURE — 93458 L HRT ARTERY/VENTRICLE ANGIO: CPT | Performed by: INTERNAL MEDICINE

## 2021-04-05 PROCEDURE — 75710 ARTERY X-RAYS ARM/LEG: CPT

## 2021-04-05 RX ORDER — SODIUM CHLORIDE 9 MG/ML
INJECTION, SOLUTION INTRAVENOUS CONTINUOUS
Status: ACTIVE | OUTPATIENT
Start: 2021-04-05 | End: 2021-04-06

## 2021-04-05 RX ORDER — SODIUM CHLORIDE 9 MG/ML
INJECTION, SOLUTION INTRAVENOUS CONTINUOUS
Status: DISCONTINUED | OUTPATIENT
Start: 2021-04-05 | End: 2021-04-09 | Stop reason: HOSPADM

## 2021-04-05 RX ORDER — SODIUM CHLORIDE 0.9 % (FLUSH) 0.9 %
10 SYRINGE (ML) INJECTION PRN
Status: DISCONTINUED | OUTPATIENT
Start: 2021-04-05 | End: 2021-04-09 | Stop reason: HOSPADM

## 2021-04-05 RX ORDER — ACETAMINOPHEN 325 MG/1
650 TABLET ORAL EVERY 4 HOURS PRN
Status: DISCONTINUED | OUTPATIENT
Start: 2021-04-05 | End: 2021-04-09 | Stop reason: HOSPADM

## 2021-04-05 RX ORDER — SODIUM CHLORIDE 0.9 % (FLUSH) 0.9 %
10 SYRINGE (ML) INJECTION EVERY 12 HOURS SCHEDULED
Status: DISCONTINUED | OUTPATIENT
Start: 2021-04-05 | End: 2021-04-09 | Stop reason: HOSPADM

## 2021-04-05 RX ADMIN — PANTOPRAZOLE SODIUM 40 MG: 40 TABLET, DELAYED RELEASE ORAL at 09:02

## 2021-04-05 RX ADMIN — ISOSORBIDE MONONITRATE 30 MG: 30 TABLET ORAL at 09:02

## 2021-04-05 RX ADMIN — ROSUVASTATIN 20 MG: 20 TABLET, FILM COATED ORAL at 20:48

## 2021-04-05 RX ADMIN — SODIUM CHLORIDE: 9 INJECTION, SOLUTION INTRAVENOUS at 18:50

## 2021-04-05 RX ADMIN — SUCRALFATE 1 G: 1 TABLET ORAL at 09:02

## 2021-04-05 RX ADMIN — Medication 1 TABLET: at 09:02

## 2021-04-05 RX ADMIN — IPRATROPIUM BROMIDE 0.5 MG: 0.5 SOLUTION RESPIRATORY (INHALATION) at 08:07

## 2021-04-05 RX ADMIN — OXYCODONE HYDROCHLORIDE AND ACETAMINOPHEN 500 MG: 500 TABLET ORAL at 09:02

## 2021-04-05 RX ADMIN — ARFORMOTEROL TARTRATE 15 MCG: 15 SOLUTION RESPIRATORY (INHALATION) at 19:47

## 2021-04-05 RX ADMIN — SUCRALFATE 1 G: 1 TABLET ORAL at 18:45

## 2021-04-05 RX ADMIN — ARFORMOTEROL TARTRATE 15 MCG: 15 SOLUTION RESPIRATORY (INHALATION) at 08:07

## 2021-04-05 RX ADMIN — CARVEDILOL 6.25 MG: 6.25 TABLET, FILM COATED ORAL at 18:45

## 2021-04-05 RX ADMIN — CARVEDILOL 6.25 MG: 6.25 TABLET, FILM COATED ORAL at 09:02

## 2021-04-05 RX ADMIN — SERTRALINE 50 MG: 50 TABLET, FILM COATED ORAL at 20:49

## 2021-04-05 RX ADMIN — ASPIRIN 81 MG: 81 TABLET, COATED ORAL at 09:02

## 2021-04-05 RX ADMIN — IPRATROPIUM BROMIDE 0.5 MG: 0.5 SOLUTION RESPIRATORY (INHALATION) at 19:47

## 2021-04-05 RX ADMIN — Medication 10 ML: at 20:48

## 2021-04-05 RX ADMIN — MESALAMINE 1.2 G: 1.2 TABLET, DELAYED RELEASE ORAL at 09:01

## 2021-04-05 RX ADMIN — LEVOTHYROXINE SODIUM 100 MCG: 0.1 TABLET ORAL at 05:48

## 2021-04-05 ASSESSMENT — PAIN SCALES - GENERAL
PAINLEVEL_OUTOF10: 0
PAINLEVEL_OUTOF10: 0

## 2021-04-05 NOTE — OP NOTE
contrast.    At this point she was stable. She had some back pain and some mild groin pain. We then held pressure for an additional 15 to 20 minutes. Additional imaging demonstrated very little contrast extravasation. We continued to hold pressure. Multiple projections were then performed. Demonstrating no evidence of extravasation. There appeared to be maybe possibly a very small pseudoaneurysm but no active leaking. At this point cardiology finished their cardiac catheterization from the left-sided access site. Upon completion of the case prior to pulling the left-sided sheath I crossed over the aortic bifurcation with a pigtail catheter and a Glidewire advantage. Images were taken of the right extremity and at this entirety. There was no active extravasation of contrast.  The common femorals patent the profundus patent the SFA was patent there is some mild disease noted in the mid SFA approximately 30% in multiple areas. But otherwise widely patent. The popliteal is patent. There is three-vessel runoff to the foot. At this point there was no active extravasation of the right common femoral therefore no plan for additional intervention. She is to lay flat all night and rest.  Cardiology will bring her back for elective cardiac catheterization for her coronary disease. They will pull and hold the left sided sheath and also hold the right side at the same time. They will call if is any questions or concerns or any issues.     Detailed Description of Procedure:       Electronically signed by Jaziel Escalante MD on 4/5/2021 at 5:19 PM

## 2021-04-05 NOTE — PROGRESS NOTES
Department of General Surgery - Adult  Surgical Service   Attending Progress Note      SUBJECTIVE:  Stable comfortable     OBJECTIVE  Stress test with artefacts and question for CAT this AM    Physical    VITALS:  BP (!) 103/54   Pulse 70   Temp 97.9 °F (36.6 °C) (Temporal)   Resp 16   Ht 4' 11\" (1.499 m)   Wt 185 lb (83.9 kg)   SpO2 97%   BMI 37.37 kg/m²   INTAKE/OUTPUT:  No intake or output data in the 24 hours ending 21 1111  TEMPERATURE:  Current - Temp: 97.9 °F (36.6 °C);  Max - Temp  Av.4 °F (36.3 °C)  Min: 96.5 °F (35.8 °C)  Max: 98 °F (36.7 °C)  RESPIRATIONS RANGE: Resp  Av  Min: 14  Max: 18  PULSE RANGE: Pulse  Av.8  Min: 60  Max: 82  BLOOD PRESSURE RANGE:  Systolic (06ZCL), ASAD:278 , Min:95 , XDP:416   ; Diastolic (41UQY), NOV:86, Min:54, Max:71    CONSTITUTIONAL:  fatigued, alert, cooperative, no apparent distress, appears older than stated age and morbidly obese  EYES:  lids and lashes normal, pupils equal, round and reactive to light and extra-ocular muscles intact  NECK:  supple, symmetrical, trachea midline, skin normal and no stridor  LUNGS:  no increased work of breathing, moderate air exchange, no retractions and diminished breath sounds right base and left base  CARDIOVASCULAR:  normal apical pulses, regular rate and rhythm and normal S1 and S2  ABDOMEN:  normal bowel sounds, soft, distended, non-tender, voluntary guarding absent and no masses palpated  Data  CBC with Differential:    Lab Results   Component Value Date    WBC 11.4 2021    RBC 4.15 2021    HGB 11.0 2021    HCT 33.3 2021     2021    MCV 83.9 2021    MCH 26.5 2021    MCHC 31.6 2021    RDW 14.2 2021    SEGSPCT 91 2011    LYMPHOPCT 8.8 2021    MONOPCT 6.1 2021    BASOPCT 0.3 2021    MONOSABS 0.69 2021    LYMPHSABS 1.00 2021    EOSABS 0.22 2021    BASOSABS 0.03 2021     CMP:    Lab Results   Component Value Date     04/02/2021    K 4.4 04/02/2021    K 3.7 08/19/2020    CL 99 04/02/2021    CO2 27 04/02/2021    BUN 21 04/02/2021    CREATININE 0.7 04/02/2021    GFRAA >60 04/02/2021    LABGLOM >60 04/02/2021    GLUCOSE 157 04/02/2021    GLUCOSE 105 04/22/2011    PROT 8.3 04/02/2021    LABALBU 4.5 04/02/2021    LABALBU 4.5 04/22/2011    CALCIUM 9.3 04/02/2021    BILITOT 0.2 04/02/2021    ALKPHOS 110 04/02/2021    AST 46 04/02/2021    ALT 32 04/02/2021       Current Inpatient Medications    Current Facility-Administered Medications: sodium chloride flush 0.9 % injection 10 mL, 10 mL, Intravenous, PRN  0.9 % sodium chloride infusion, 25 mL, Intravenous, PRN  ascorbic acid (VITAMIN C) tablet 500 mg, 500 mg, Oral, Daily  aspirin EC tablet 81 mg, 81 mg, Oral, Daily  isosorbide mononitrate (IMDUR) extended release tablet 30 mg, 30 mg, Oral, Daily  levothyroxine (SYNTHROID) tablet 100 mcg, 100 mcg, Oral, Daily  mesalamine (LIALDA) EC tablet 1.2 g, 1,200 mg, Oral, Daily with breakfast  therapeutic multivitamin-minerals 1 tablet, 1 tablet, Oral, Daily  pantoprazole (PROTONIX) tablet 40 mg, 40 mg, Oral, Daily  sertraline (ZOLOFT) tablet 50 mg, 50 mg, Oral, Nightly  sucralfate (CARAFATE) tablet 1 g, 1 g, Oral, BID  ipratropium (ATROVENT) 0.02 % nebulizer solution 0.5 mg, 0.5 mg, Nebulization, 4x daily **AND** Arformoterol Tartrate (BROVANA) nebulizer solution 15 mcg, 15 mcg, Nebulization, BID  rosuvastatin (CRESTOR) tablet 20 mg, 20 mg, Oral, Nightly  perflutren lipid microspheres (DEFINITY) injection 1.65 mg, 1.5 mL, Intravenous, ONCE PRN  carvedilol (COREG) tablet 6.25 mg, 6.25 mg, Oral, BID WC    ASSESSMENT AND PLAN cat this Am  Possible swallow and esophagogram tomorrow if cat favorable  Long term management of dysphagia

## 2021-04-05 NOTE — PROGRESS NOTES
General Progress Note  4/5/2021 9:35 AM  Subjective:   Admit Date: 4/1/2021  PCP: Blue Francis MD  Interval History: no acute issues overnight. Events of weekend noted. Patient going for heart cath today due to abnormal stress test on 4-2-2021. Diet: Diet NPO, After Midnight  Pain is:None  Nausea:None  Bowel Movement/Flatus yes    Data:   Scheduled Meds:   ascorbic acid  500 mg Oral Daily    aspirin  81 mg Oral Daily    isosorbide mononitrate  30 mg Oral Daily    levothyroxine  100 mcg Oral Daily    mesalamine  1,200 mg Oral Daily with breakfast    therapeutic multivitamin-minerals  1 tablet Oral Daily    pantoprazole  40 mg Oral Daily    sertraline  50 mg Oral Nightly    sucralfate  1 g Oral BID    ipratropium  0.5 mg Nebulization 4x daily    And    Arformoterol Tartrate  15 mcg Nebulization BID    rosuvastatin  20 mg Oral Nightly    carvedilol  6.25 mg Oral BID WC     Continuous Infusions:   sodium chloride       PRN Meds:sodium chloride flush, sodium chloride, perflutren lipid microspheres  No intake/output data recorded. No intake/output data recorded.   No intake or output data in the 24 hours ending 04/05/21 0935    CBC with Differential:    Lab Results   Component Value Date    WBC 11.4 04/02/2021    RBC 4.15 04/02/2021    HGB 11.0 04/02/2021    HCT 33.3 04/04/2021     04/02/2021    MCV 83.9 04/02/2021    MCH 26.5 04/02/2021    MCHC 31.6 04/02/2021    RDW 14.2 04/02/2021    SEGSPCT 91 04/22/2011    LYMPHOPCT 8.8 04/02/2021    MONOPCT 6.1 04/02/2021    BASOPCT 0.3 04/02/2021    MONOSABS 0.69 04/02/2021    LYMPHSABS 1.00 04/02/2021    EOSABS 0.22 04/02/2021    BASOSABS 0.03 04/02/2021     CMP:    Lab Results   Component Value Date     04/02/2021    K 4.4 04/02/2021    K 3.7 08/19/2020    CL 99 04/02/2021    CO2 27 04/02/2021    BUN 21 04/02/2021    CREATININE 0.7 04/02/2021    GFRAA >60 04/02/2021    LABGLOM >60 04/02/2021    GLUCOSE 157 04/02/2021    GLUCOSE 105 04/22/2011    PROT 8.3 04/02/2021    LABALBU 4.5 04/02/2021    LABALBU 4.5 04/22/2011    CALCIUM 9.3 04/02/2021    BILITOT 0.2 04/02/2021    ALKPHOS 110 04/02/2021    AST 46 04/02/2021    ALT 32 04/02/2021     Magnesium:  No results found for: MG  Phosphorus:    Lab Results   Component Value Date    PHOS 3.1 02/02/2021     PT/INR:    Lab Results   Component Value Date    PROTIME 10.9 04/02/2021    PROTIME 11.9 04/22/2011    INR 1.0 04/02/2021     Last 3 Troponin:    Lab Results   Component Value Date    TROPONINI 0.04 04/02/2021    TROPONINI 0.03 04/02/2021    TROPONINI <0.01 08/20/2020     U/A:    Lab Results   Component Value Date    COLORU Yellow 12/10/2014    PHUR 6.0 12/10/2014    WBCUA >20 12/10/2014    WBCUA 2-5 04/22/2011    RBCUA 0-1 12/10/2014    RBCUA 1-3 04/22/2011    BACTERIA MANY 12/10/2014    CLARITYU TURBID 12/10/2014    SPECGRAV 1.015 12/10/2014    LEUKOCYTESUR MODERATE 12/10/2014    UROBILINOGEN 0.2 12/10/2014    BILIRUBINUR Negative 12/10/2014    BILIRUBINUR NEGATIVE 04/22/2011    BLOODU SMALL 12/10/2014    GLUCOSEU Negative 12/10/2014    GLUCOSEU NEGATIVE 04/22/2011     HgBA1c:  No components found for: HGBA1C  TSH:    Lab Results   Component Value Date    TSH 0.836 01/11/2021     -----------------------------------------------------------------    Objective:   Vitals: BP (!) 103/54   Pulse 70   Temp 97.9 °F (36.6 °C) (Temporal)   Resp 16   Ht 4' 11\" (1.499 m)   Wt 185 lb (83.9 kg)   SpO2 97%   BMI 37.37 kg/m²   General appearance: alert, appears stated age and cooperative  Skin: Skin color, texture, turgor normal. No rashes or lesions  HEENT: Head: Normal, normocephalic, atraumatic.   Neck: no adenopathy, no carotid bruit, no JVD, supple, symmetrical, trachea midline and thyroid not enlarged, symmetric, no tenderness/mass/nodules  Lungs: clear to auscultation bilaterally  Heart: regular rate and rhythm, S1, S2 normal, no murmur, click, rub or gallop  Abdomen: soft, non-tender; bowel sounds normal; no masses, no organomegaly  Extremities: extremities normal, atraumatic, no cyanosis or edema  Neurologic: Mental status: Alert, oriented, thought content appropriate      NM Cardiac Stress Test Nuclear Imaging [[id number]] Resulted: 04/02/21 1631      Order Status: Completed Updated: 04/02/21 1634     Narrative:       INDICATION: Chest pain     PROTOCOL:   Rest/stress single isotope myocardial perfusion imaging with   pharmacologic stress and gated SPECT imaging utilizing regadenoson. Reconstruction and reorientation of SPECT images in the short,   vertical and horizontal long axis. Gated SPECT wall motion study with quantitative assessment of left   ventricular ejection fraction. TECHNIQUE:   Regadenoson dose: 0.4 mg   Radiopharmaceutical stress dose: 35 mCi Tc-99m sestamibi   Radiopharmaceutical rest dose: 11.5 mCi Tc-99m sestamibi     FINDINGS:   The technical quality of the study is poor. Rotating planar images demonstrate no significant patient motion;   intense uptake in the gallbladder. The heart appears enlarged on both rest and stress images. Post-stress tomographic images demonstrate diffuse attenuation on the   HEALBE software which appears artifactual. On review of the polar   plot data and GE screen captures, there appeared to be mild fixed   defects in the anterior and inferior walls with no significant   reversibility. Gated images demonstrate global hypokinesis with a calculated left   ventricular ejection fraction of 41%. End-diastolic volume 246 mL. TID   ratio 1.25.        Impression:         1. Technically difficult study due to image processing issues. 2. There appear to be fixed anterior and inferior defects. 3. No definitive reversible defects to suggest ischemia. 4. Dilated left ventricle with moderate LV systolic dysfunction, EF   41%. 5. Borderline abnormal TID ratio of 1.25.   6. Intermediate risk myocardial perfusion study.                Assessment:   Active Problems:    Chest pain  Resolved Problems:    * No resolved hospital problems. *    Plan:   1. Patient to go for heart cath today. 2. Will follow.     Jessica Montejo D.O.  9:35 AM  4/5/2021

## 2021-04-05 NOTE — CARE COORDINATION
For cath and esaphogram today. Discharge pending the above.  Plan remains home with no needs at discharge

## 2021-04-06 ENCOUNTER — APPOINTMENT (OUTPATIENT)
Dept: GENERAL RADIOLOGY | Age: 71
DRG: 287 | End: 2021-04-06
Payer: MEDICARE

## 2021-04-06 LAB
ANION GAP SERPL CALCULATED.3IONS-SCNC: 8 MMOL/L (ref 7–16)
BUN BLDV-MCNC: 18 MG/DL (ref 8–23)
CALCIUM SERPL-MCNC: 8.7 MG/DL (ref 8.6–10.2)
CHLORIDE BLD-SCNC: 105 MMOL/L (ref 98–107)
CO2: 26 MMOL/L (ref 22–29)
CREAT SERPL-MCNC: 0.7 MG/DL (ref 0.5–1)
GFR AFRICAN AMERICAN: >60
GFR NON-AFRICAN AMERICAN: >60 ML/MIN/1.73
GLUCOSE BLD-MCNC: 110 MG/DL (ref 74–99)
HCT VFR BLD CALC: 27 % (ref 34–48)
HEMOGLOBIN: 8.4 G/DL (ref 11.5–15.5)
MCH RBC QN AUTO: 26.5 PG (ref 26–35)
MCHC RBC AUTO-ENTMCNC: 31.1 % (ref 32–34.5)
MCV RBC AUTO: 85.2 FL (ref 80–99.9)
PDW BLD-RTO: 14.3 FL (ref 11.5–15)
PLATELET # BLD: 195 E9/L (ref 130–450)
PMV BLD AUTO: 11.1 FL (ref 7–12)
POTASSIUM SERPL-SCNC: 4 MMOL/L (ref 3.5–5)
RBC # BLD: 3.17 E12/L (ref 3.5–5.5)
SODIUM BLD-SCNC: 139 MMOL/L (ref 132–146)
WBC # BLD: 6.7 E9/L (ref 4.5–11.5)

## 2021-04-06 PROCEDURE — 99233 SBSQ HOSP IP/OBS HIGH 50: CPT | Performed by: INTERNAL MEDICINE

## 2021-04-06 PROCEDURE — 74230 X-RAY XM SWLNG FUNCJ C+: CPT

## 2021-04-06 PROCEDURE — 2500000003 HC RX 250 WO HCPCS: Performed by: RADIOLOGY

## 2021-04-06 PROCEDURE — 74220 X-RAY XM ESOPHAGUS 1CNTRST: CPT

## 2021-04-06 PROCEDURE — 2060000000 HC ICU INTERMEDIATE R&B

## 2021-04-06 PROCEDURE — 92611 MOTION FLUOROSCOPY/SWALLOW: CPT

## 2021-04-06 PROCEDURE — 36415 COLL VENOUS BLD VENIPUNCTURE: CPT

## 2021-04-06 PROCEDURE — 80048 BASIC METABOLIC PNL TOTAL CA: CPT

## 2021-04-06 PROCEDURE — 6370000000 HC RX 637 (ALT 250 FOR IP): Performed by: NEUROMUSCULOSKELETAL MEDICINE & OMM

## 2021-04-06 PROCEDURE — 6360000004 HC RX CONTRAST MEDICATION: Performed by: RADIOLOGY

## 2021-04-06 PROCEDURE — A4641 RADIOPHARM DX AGENT NOC: HCPCS | Performed by: RADIOLOGY

## 2021-04-06 PROCEDURE — 85027 COMPLETE CBC AUTOMATED: CPT

## 2021-04-06 PROCEDURE — 94640 AIRWAY INHALATION TREATMENT: CPT

## 2021-04-06 PROCEDURE — 6360000002 HC RX W HCPCS: Performed by: NEUROMUSCULOSKELETAL MEDICINE & OMM

## 2021-04-06 PROCEDURE — 6370000000 HC RX 637 (ALT 250 FOR IP): Performed by: INTERNAL MEDICINE

## 2021-04-06 RX ADMIN — CARVEDILOL 6.25 MG: 6.25 TABLET, FILM COATED ORAL at 08:58

## 2021-04-06 RX ADMIN — ARFORMOTEROL TARTRATE 15 MCG: 15 SOLUTION RESPIRATORY (INHALATION) at 20:05

## 2021-04-06 RX ADMIN — BARIUM SULFATE 1 TABLET: 700 TABLET ORAL at 15:12

## 2021-04-06 RX ADMIN — SUCRALFATE 1 G: 1 TABLET ORAL at 08:59

## 2021-04-06 RX ADMIN — BARIUM SULFATE 15 ML: 960 POWDER, FOR SUSPENSION ORAL at 15:08

## 2021-04-06 RX ADMIN — IPRATROPIUM BROMIDE 0.5 MG: 0.5 SOLUTION RESPIRATORY (INHALATION) at 15:54

## 2021-04-06 RX ADMIN — ASPIRIN 81 MG: 81 TABLET, COATED ORAL at 08:58

## 2021-04-06 RX ADMIN — OXYCODONE HYDROCHLORIDE AND ACETAMINOPHEN 500 MG: 500 TABLET ORAL at 08:58

## 2021-04-06 RX ADMIN — ARFORMOTEROL TARTRATE 15 MCG: 15 SOLUTION RESPIRATORY (INHALATION) at 08:10

## 2021-04-06 RX ADMIN — BARIUM SULFATE 15 ML: 400 SUSPENSION ORAL at 15:08

## 2021-04-06 RX ADMIN — ISOSORBIDE MONONITRATE 30 MG: 30 TABLET ORAL at 08:58

## 2021-04-06 RX ADMIN — SERTRALINE 50 MG: 50 TABLET, FILM COATED ORAL at 20:27

## 2021-04-06 RX ADMIN — SUCRALFATE 1 G: 1 TABLET ORAL at 17:14

## 2021-04-06 RX ADMIN — MESALAMINE 1.2 G: 1.2 TABLET, DELAYED RELEASE ORAL at 08:58

## 2021-04-06 RX ADMIN — IPRATROPIUM BROMIDE 0.5 MG: 0.5 SOLUTION RESPIRATORY (INHALATION) at 08:10

## 2021-04-06 RX ADMIN — BARIUM SULFATE 176 G: 960 POWDER, FOR SUSPENSION ORAL at 15:12

## 2021-04-06 RX ADMIN — IPRATROPIUM BROMIDE 0.5 MG: 0.5 SOLUTION RESPIRATORY (INHALATION) at 20:05

## 2021-04-06 RX ADMIN — LEVOTHYROXINE SODIUM 100 MCG: 0.1 TABLET ORAL at 05:55

## 2021-04-06 RX ADMIN — BARIUM SULFATE 15 ML: 400 PASTE ORAL at 15:07

## 2021-04-06 RX ADMIN — PANTOPRAZOLE SODIUM 40 MG: 40 TABLET, DELAYED RELEASE ORAL at 08:58

## 2021-04-06 RX ADMIN — Medication 1 TABLET: at 08:58

## 2021-04-06 RX ADMIN — ROSUVASTATIN 20 MG: 20 TABLET, FILM COATED ORAL at 20:27

## 2021-04-06 RX ADMIN — CARVEDILOL 6.25 MG: 6.25 TABLET, FILM COATED ORAL at 17:14

## 2021-04-06 ASSESSMENT — PAIN SCALES - GENERAL: PAINLEVEL_OUTOF10: 0

## 2021-04-06 NOTE — PROGRESS NOTES
General Progress Note  4/6/2021 9:20 AM  Subjective:   Admit Date: 4/1/2021  PCP: Mariela Chung MD  Interval History: heart cath results noted, with PCI to RCA. No chest pain or SOB. Denies any nausea or vomiting. Diet: DIET CARDIAC;  Pain is:None  Nausea:None  Bowel Movement/Flatus yes    Data:   Scheduled Meds:   sodium chloride flush  10 mL Intravenous 2 times per day    ascorbic acid  500 mg Oral Daily    aspirin  81 mg Oral Daily    isosorbide mononitrate  30 mg Oral Daily    levothyroxine  100 mcg Oral Daily    mesalamine  1,200 mg Oral Daily with breakfast    therapeutic multivitamin-minerals  1 tablet Oral Daily    pantoprazole  40 mg Oral Daily    sertraline  50 mg Oral Nightly    sucralfate  1 g Oral BID    ipratropium  0.5 mg Nebulization 4x daily    And    Arformoterol Tartrate  15 mcg Nebulization BID    rosuvastatin  20 mg Oral Nightly    carvedilol  6.25 mg Oral BID WC     Continuous Infusions:   sodium chloride      sodium chloride      sodium chloride 50 mL/hr at 04/05/21 1850    sodium chloride       PRN Meds:sodium chloride flush, acetaminophen, sodium chloride flush, sodium chloride, perflutren lipid microspheres  I/O last 3 completed shifts:  In: -   Out: 300 [Urine:300]  No intake/output data recorded.     Intake/Output Summary (Last 24 hours) at 4/6/2021 0920  Last data filed at 4/5/2021 2049  Gross per 24 hour   Intake --   Output 300 ml   Net -300 ml       CBC with Differential:    Lab Results   Component Value Date    WBC 6.7 04/06/2021    RBC 3.17 04/06/2021    HGB 8.4 04/06/2021    HCT 27.0 04/06/2021     04/06/2021    MCV 85.2 04/06/2021    MCH 26.5 04/06/2021    MCHC 31.1 04/06/2021    RDW 14.3 04/06/2021    SEGSPCT 91 04/22/2011    LYMPHOPCT 8.8 04/02/2021    MONOPCT 6.1 04/02/2021    BASOPCT 0.3 04/02/2021    MONOSABS 0.69 04/02/2021    LYMPHSABS 1.00 04/02/2021    EOSABS 0.22 04/02/2021    BASOSABS 0.03 04/02/2021     CMP:    Lab Results   Component Value Date     04/06/2021    K 4.0 04/06/2021    K 3.7 08/19/2020     04/06/2021    CO2 26 04/06/2021    BUN 18 04/06/2021    CREATININE 0.7 04/06/2021    GFRAA >60 04/06/2021    LABGLOM >60 04/06/2021    GLUCOSE 110 04/06/2021    GLUCOSE 105 04/22/2011    PROT 8.3 04/02/2021    LABALBU 4.5 04/02/2021    LABALBU 4.5 04/22/2011    CALCIUM 8.7 04/06/2021    BILITOT 0.2 04/02/2021    ALKPHOS 110 04/02/2021    AST 46 04/02/2021    ALT 32 04/02/2021     Magnesium:  No results found for: MG  Phosphorus:    Lab Results   Component Value Date    PHOS 3.1 02/02/2021     PT/INR:    Lab Results   Component Value Date    PROTIME 10.9 04/02/2021    PROTIME 11.9 04/22/2011    INR 1.0 04/02/2021     Last 3 Troponin:    Lab Results   Component Value Date    TROPONINI 0.04 04/02/2021    TROPONINI 0.03 04/02/2021    TROPONINI <0.01 08/20/2020     U/A:    Lab Results   Component Value Date    COLORU Yellow 12/10/2014    PHUR 6.0 12/10/2014    WBCUA >20 12/10/2014    WBCUA 2-5 04/22/2011    RBCUA 0-1 12/10/2014    RBCUA 1-3 04/22/2011    BACTERIA MANY 12/10/2014    CLARITYU TURBID 12/10/2014    SPECGRAV 1.015 12/10/2014    LEUKOCYTESUR MODERATE 12/10/2014    UROBILINOGEN 0.2 12/10/2014    BILIRUBINUR Negative 12/10/2014    BILIRUBINUR NEGATIVE 04/22/2011    BLOODU SMALL 12/10/2014    GLUCOSEU Negative 12/10/2014    GLUCOSEU NEGATIVE 04/22/2011     HgBA1c:  No components found for: HGBA1C  TSH:    Lab Results   Component Value Date    TSH 0.836 01/11/2021     -----------------------------------------------------------------    Objective:   Vitals: BP (!) 115/56   Pulse 77   Temp 98.5 °F (36.9 °C) (Oral)   Resp 16   Ht 4' 11\" (1.499 m)   Wt 185 lb (83.9 kg)   SpO2 94%   BMI 37.37 kg/m²   General appearance: alert, appears stated age and cooperative  Skin: Skin color, texture, turgor normal. No rashes or lesions  HEENT: Head: Normal, normocephalic, atraumatic.   Neck: no adenopathy, no carotid bruit, no JVD, supple, symmetrical, trachea midline and thyroid not enlarged, symmetric, no tenderness/mass/nodules  Lungs: clear to auscultation bilaterally  Heart: regular rate and rhythm, S1, S2 normal, no murmur, click, rub or gallop  Abdomen: soft, non-tender; bowel sounds normal; no masses,  no organomegaly  Extremities: extremities normal, atraumatic, no cyanosis or edema  Neurologic: Mental status: Alert, oriented, thought content appropriate        Phoebe Ridley MD   Physician   Cardiology   Procedures   Signed   Date of Service:  2021  5:14 PM            Procedure Orders   Cardiac Catheterization [[id number]] ordered by Phoebe Ridley MD at 21 1157          Signed                         [address]                  [address] [facility] [address] [place] [address]                             CARDIAC CATHETERIZATION     PATIENT NAME: Rodney Valverde                 :        1950  MED REC NO:   [id number]                            ROOM:       [facility]  ACCOUNT NO:   [de-identified]                           ADMIT DATE: 2021  PROVIDER:     Orlando Gomez MD     DATE OF PROCEDURE:  2021     LEFT HEART CATHETERIZATION     INDICATION:  Chest pain with abnormal Lexiscan.     PROCEDURE NOTE:  The patient was brought to the cardiac cath lab in her  usual fasting state. Under sterile condition and under local anesthetic  and using 5-Surinamese micropuncture needle, a 6-Surinamese sheath was inserted  into the right common femoral artery. To mention that the patient had a  left heart catheterization done in 2020 and I could not do the heart  cath from the right radial due to severe occlusion of the  subclavian/brachial artery. Then, I could not advance a J-tip wire or a  Wholey wire to the iliac arteries. Then, a peripheral angiogram  obtained revealed extravasation of contrast lateral to the right common  femoral artery.  Manual compression done and Dr. Deepika Uriostegui called for concern of  retroperitoneal bleed. Dr. Kristy Amaya obtained access from the left  common femoral artery using ultrasound guidance and using a 4-Senegalese  sheath. Then, he did an angiogram of the right iliac artery  which revealed no more extravasation of contrast.  He then completed  peripheral angiogram of the right lower extremity. Please see his note  for details. Then, a 4-Senegalese JL-4 diagnostic catheter was advanced to  the ascending aorta without difficulty. The left main coronary artery  was engaged and a coronary angiogram was done. Then, this catheter was  exchanged over a guidewire to a 4-Senegalese JR-4 diagnostic catheter which  was used to engage the right coronary artery and angiogram was done. This catheter was exchanged over the guidewire to a 4-Senegalese pigtail  which was advanced into the left ventricle without difficulty. Left  ventricular end-diastolic pressure was measured. Left ventriculogram  was done. There was no significant gradient across the aortic valve. At the end of the procedure, pigtail was pulled out. The left groin  sheath was pulled out and manual pressure was applied with good  hemostasis. No hematoma was noted in the  right groin. The patient tolerated the procedure well. No significant blood loss  occurred during the procedure.     FINDINGS:  HEMODYNAMICS:  1. Aortic pressure 117/57 mmHg. 2.  Left ventricular end-diastolic pressure 5 mmHg.     CORONARY ANATOMY:  LEFT MAIN:  It is a large artery with no angiographic stenosis noted.     LAD:  It is a large artery giving rise to two diagonal branches and  several septal perforators. Mild irregularities were noted in the LAD.     LEFT CIRCUMFLEX:  It is a large artery giving rise to large obtuse  marginal branch, then continues to the AV groove. There was 30% to 40%  discrete proximal circumflex stenosis.     RIGHT CORONARY ARTERY:  It is a large dominant artery giving rise to an  RV marginal branch, a PDA and a PLV branch.

## 2021-04-06 NOTE — PROGRESS NOTES
Reason for follow up: Chest pain, abnormal Lexiscan, and severe RCA stenosis. Subjective: Patient denies chest discomfort or dyspnea. She denies back pain. Objective:    No distress. No events overnight. Scheduled Meds:   sodium chloride flush  10 mL Intravenous 2 times per day    ascorbic acid  500 mg Oral Daily    aspirin  81 mg Oral Daily    isosorbide mononitrate  30 mg Oral Daily    levothyroxine  100 mcg Oral Daily    mesalamine  1,200 mg Oral Daily with breakfast    therapeutic multivitamin-minerals  1 tablet Oral Daily    pantoprazole  40 mg Oral Daily    sertraline  50 mg Oral Nightly    sucralfate  1 g Oral BID    ipratropium  0.5 mg Nebulization 4x daily    And    Arformoterol Tartrate  15 mcg Nebulization BID    rosuvastatin  20 mg Oral Nightly    carvedilol  6.25 mg Oral BID WC       Continuous Infusions:   sodium chloride      sodium chloride      sodium chloride 50 mL/hr at 04/05/21 1850    sodium chloride             Intake/Output Summary (Last 24 hours) at 4/6/2021 1145  Last data filed at 4/5/2021 2049  Gross per 24 hour   Intake --   Output 300 ml   Net -300 ml       No data found. PE:   BP (!) 115/56   Pulse 77   Temp 98.5 °F (36.9 °C) (Oral)   Resp 16   Ht 4' 11\" (1.499 m)   Wt 185 lb (83.9 kg)   SpO2 94%   BMI 37.37 kg/m²   CONST: Middle-aged obese female who appears of stated age. Awake, alert, cooperative, no apparent distress. HEENT: Head- normocephalic, atraumatic. Neck:  no jugular venous distention. No carotid bruit noted. LUNGS: Clear. CARDIOVASCULAR:  RRR, very soft systolic murmur, no s3, s4 or rub noted. PV: No lower extremity edema. No bruising or hematoma over both groins. Pedal pulses palpable, no clubbing or cyanosis   ABDOMEN: Soft, non-tender to light palpation. Bowel sounds present.  No palpable masses no hepatosplenomegaly or splenomegaly; no abdominal bruit / pulsation  SKIN: Warm and dry Clement Fly NEURO / PSYCH: Oriented to person, place and time. Speech clear and appropriate. Follows all commands. Pleasant affect. Monitor: Sinus rhythm. Lab Review       Recent Labs     04/04/21  0451 04/06/21  0612   WBC  --  6.7   HGB  --  8.4*   HCT 33.3* 27.0*   PLT  --  195       Recent Labs     04/06/21  0612      K 4.0      CO2 26   BUN 18   CREATININE 0.7         Last 3 Troponin:    Lab Results   Component Value Date    TROPONINI 0.04 04/02/2021    TROPONINI 0.03 04/02/2021    TROPONINI <0.01 08/20/2020      Left heart catheterization done yesterday revealed 80% discrete mid RCA stenosis. Procedure was complicated by right common femoral artery iatrogenic injury with extravasation of contrast.  No intervention needed but stabilized with manual pressure. Assessment:  -CAD: Patient has a 80% proximal to mid RCA stenosis. Stable with no angina.  -Status post right common femoral artery iatrogenic injury.  -Anemia: Probably due to hydration and bleeding.  -Hypertension: Controlled. -Hyperlipidemia: On Crestor.  -PFO. -Right axillary artery stenosis secondary to thoracic outlet syndrome.  -Hypothyroidism.  -Obesity. Plan:  -Continue current cardiac medications.  -Keep n.p.o. after midnight.  -Recheck CBC in a.m. to monitor hematocrit/hemoglobin.  -Staged PCI of the RCA in a.m. if stable hematocrit/hemoglobin. Electronically signed by Beatriz Sacks, MD on 4/6/2021 at [facility].

## 2021-04-06 NOTE — PROCEDURES
[address]                  [id number] [facility]                            CARDIAC CATHETERIZATION    PATIENT NAME: Angie Iglesias                 :        1950  MED REC NO:   [id number]                            ROOM:       4509  ACCOUNT NO:   [de-identified]                           ADMIT DATE: 2021  PROVIDER:     Tonie Rod MD    DATE OF PROCEDURE:  2021    LEFT HEART CATHETERIZATION    INDICATION:  Chest pain with abnormal Lexiscan. PROCEDURE NOTE:  The patient was brought to the cardiac cath lab in her  usual fasting state. Under sterile condition and under local anesthetic  and using 5-Citizen of [place] micropuncture needle, a 6-[place]izen [place] sheath was inserted  into the right common femoral artery. To mention that the patient had a  left heart catheterization done in 2020 and I could not do the heart  cath from the right radial due to severe occlusion of the  subclavian/brachial artery. Then, I could not advance a J-tip wire or a  Wholey wire to the iliac arteries. Then, a peripheral angiogram  obtained revealed extravasation of contrast lateral to the right common  femoral artery. Manual compression done and Dr. Silvestre Wray called for concern of  retroperitoneal bleed. Dr. Silvestre Wray obtained access from the left  common femoral artery using ultrasound guidance and using a 4-Citizen of [place]  sheath. Then, he did an angiogram of the right iliac artery  which revealed no more extravasation of contrast.  He then completed  peripheral angiogram of the right lower extremity. Please see his note  for details. Then, a 4-Citizen [place] JL-4 diagnostic catheter was advanced to  the ascending aorta without difficulty. The left main coronary artery  was engaged and a coronary angiogram was done.   Then, this catheter was  exchanged over a guidewire to a 4-Citizen [place] JR-4 diagnostic catheter which  was used to engage the right coronary artery and angiogram was done. This catheter was exchanged over the guidewire to a 4-[place] pigtail  which was advanced into the left ventricle without difficulty. Left  ventricular end-diastolic pressure was measured. Left ventriculogram  was done. There was no significant gradient across the aortic valve. At the end of the procedure, pigtail was pulled out. The left groin  sheath was pulled out and manual pressure was applied with good  hemostasis. No hematoma was noted in the  right groin. The patient tolerated the procedure well. No significant blood loss  occurred during the procedure. FINDINGS:  HEMODYNAMICS:  1. Aortic pressure 117/57 mmHg. 2.  Left ventricular end-diastolic pressure 5 mmHg. CORONARY ANATOMY:  LEFT MAIN:  It is a large artery with no angiographic stenosis noted. LAD:  It is a large artery giving rise to two diagonal branches and  several septal perforators. Mild irregularities were noted in the LAD. LEFT CIRCUMFLEX:  It is a large artery giving rise to large obtuse  marginal branch, then continues to the AV groove. There was 30% to 40%  discrete proximal circumflex stenosis. RIGHT CORONARY ARTERY:  It is a large dominant artery giving rise to an  RV marginal branch, a PDA and a PLV branch. There was 80% discrete mid  stenosis followed up by 50% discrete eccentric mid stenosis with KAROLINA-3  flow distally. Left ventriculogram revealed hypokinetic left ventricle with an ejection  fraction of 40% to 45%. No mitral regurgitation was noted. IMPRESSION:  1.  80% discrete mid RCA stenosis followed up by another 50% discrete  mid stenosis. 2.  Ejection fraction 40% to 45%. RECOMMENDATION:  Staged PCI of the RCA. PROCEDURE START TIME:  7621. PROCEDURE END TIME:  1648. FLUOROSCOPY TIME:  9.8 minutes. CONTRAST VOLUME:  110 mL of Optiray. CONSCIOUS SEDATION:  1 mg of intravenous Versed and 150 mcg of  intravenous fentanyl.         Prince Jackson MD    D: 04/05/2021 17:11:10 T: 04/05/2021 17:14:58     [email or web address]  Job#: [id number]     Doc#: [id number]    CC:

## 2021-04-06 NOTE — PROGRESS NOTES
SPEECH/LANGUAGE PATHOLOGY  VIDEOFLUOROSCOPIC STUDY OF SWALLOWING (MBS)      PATIENT NAME:  Glenys Bowie      :  1950          TODAY'S DATE:  2021 ROOM:  [address] [place] [address]      SUMMARY OF EVALUATION     DYSPHAGIA DIAGNOSIS:  Swallowing function was within normal limits      DIET RECOMMENDATIONS:  Regular consistency solids with  thin liquids     FEEDING RECOMMENDATIONS:     Assistance level:  No assistance required      Compensatory strategies recommended: compensatory strategies were not needed    THERAPY RECOMMENDATIONS:      Dysphagia therapy is not recommended                 PROCEDURE     Consistencies Administered During the Evaluation   Liquids: Thin, nectar   Solids:  Pureed, solids     Method of Intake:   Cup, spoon, straw  Upright seated, lateral position                  RESULTS     ORAL STAGE     The oral stage of swallowing was within functional limits       PHARYNGEAL STAGE           ONSET TIME     Onset time of the pharyngeal swallow was adequate       PHARYNGEAL RESIDUALS          No significant residuals were noted in the vallecula and pyriform sinuses    LARYNGEAL PENETRATION     Laryngeal penetration was absent during the evaluation                ASPIRATION    Aspiration was absent during the evaluation        STRUCTURAL/FUNCTIONAL ANOMALIES      No structural anomalies were noted      CERVICAL ESOPHAGEAL STAGE :        The cervical esophagus appeared normal                    CPT code:  46292  dysphagia study                   [x]The admitting diagnosis and active problem list, as listed below have been reviewed prior to initiation of this evaluation.      ADMITTING DIAGNOSIS: Chest pain [R07.9]     ACTIVE PROBLEM LIST:   Patient Active Problem List   Diagnosis    PFO (patent foramen ovale)    [facility]    Hypertension    History of thoracic outlet syndrome    Hiatal hernia    Nephrolithiasis    Hypothyroidism    Nonischemic cardiomyopathy ([facility]    ROSALVA (obstructive sleep apnea)    Class 2 severe obesity due to excess calories with serious comorbidity and body mass index (BMI) of 39.0 to 39.9 in adult [facility])    Coronary artery disease involving native coronary artery of native heart without angina pectoris    Superficial phlebitis and thrombophlebitis of right lower extremity    Severe episode of recurrent major depressive disorder, without psychotic features ([facility]    Chest pain    Tracheomalacia

## 2021-04-06 NOTE — CARE COORDINATION
Cardiac cath yesterday with common femoral artery injury. Pt will need stents placed tomorrow per cardiology. Esophogram pending. Plan remains home at discharge.

## 2021-04-06 NOTE — PROGRESS NOTES
Department of General Surgery - Adult  Surgical Service   Attending Progress Note      SUBJECTIVE:  Comfortable this am    OBJECTIVE  Drop in HGB monitor    Physical    VITALS:  BP (!) 115/56   Pulse 77   Temp 98.5 °F (36.9 °C) (Oral)   Resp 16   Ht 4' 11\" (1.499 m)   Wt 185 lb (83.9 kg)   SpO2 94%   BMI 37.37 kg/m²   INTAKE/OUTPUT:    Intake/Output Summary (Last 24 hours) at 2021 1046  Last data filed at 2021  Gross per 24 hour   Intake --   Output 300 ml   Net -300 ml     TEMPERATURE:  Current - Temp: 98.5 °F (36.9 °C);  Max - Temp  Av.9 °F (36.6 °C)  Min: 97.6 °F (36.4 °C)  Max: 98.5 °F (36.9 °C)  RESPIRATIONS RANGE: Resp  Av  Min: 16  Max: 16  PULSE RANGE: Pulse  Av.3  Min: 77  Max: 79  BLOOD PRESSURE RANGE:  Systolic (33EZB), QZP:180 , Min:115 , AAY:351   ; Diastolic (27ENR), RONNELL:23, Min:53, Max:82    CONSTITUTIONAL:  awake, alert, cooperative, no apparent distress, appears older than stated age and morbidly obese  EYES:  lids and lashes normal, pupils equal, round and reactive to light, extra-ocular muscles intact and sclera clear  NECK:  supple, symmetrical, trachea midline, skin normal and no stridor  LUNGS:  no increased work of breathing, good air exchange, no retractions and clear to auscultation  CARDIOVASCULAR:  normal apical pulses, regular rate and rhythm and normal S1 and S2  ABDOMEN:  normal bowel sounds, soft, non-distended, non-tender, voluntary guarding absent and no masses palpated  Data  CBC with Differential:    Lab Results   Component Value Date    WBC 6.7 2021    RBC 3.17 2021    HGB 8.4 2021    HCT 27.0 2021     2021    MCV 85.2 2021    MCH 26.5 2021    MCHC 31.1 2021    RDW 14.3 2021    SEGSPCT 91 2011    LYMPHOPCT 8.8 2021    MONOPCT 6.1 2021    BASOPCT 0.3 2021    MONOSABS 0.69 2021    LYMPHSABS 1.00 2021    EOSABS 0.22 2021    BASOSABS 0.03 2021 CMP:    Lab Results   Component Value Date     04/06/2021    K 4.0 04/06/2021    K 3.7 08/19/2020     04/06/2021    CO2 26 04/06/2021    BUN 18 04/06/2021    CREATININE 0.7 04/06/2021    GFRAA >60 04/06/2021    LABGLOM >60 04/06/2021    GLUCOSE 110 04/06/2021    GLUCOSE 105 04/22/2011    PROT 8.3 04/02/2021    LABALBU 4.5 04/02/2021    LABALBU 4.5 04/22/2011    CALCIUM 8.7 04/06/2021    BILITOT 0.2 04/02/2021    ALKPHOS 110 04/02/2021    AST 46 04/02/2021    ALT 32 04/02/2021       Current Inpatient Medications    Current Facility-Administered Medications: 0.9 % sodium chloride infusion, , Intravenous, Continuous  0.9 % sodium chloride infusion, , Intravenous, Continuous  sodium chloride flush 0.9 % injection 10 mL, 10 mL, Intravenous, 2 times per day  sodium chloride flush 0.9 % injection 10 mL, 10 mL, Intravenous, PRN  acetaminophen (TYLENOL) tablet 650 mg, 650 mg, Oral, Q4H PRN  0.9 % sodium chloride infusion, , Intravenous, Continuous  sodium chloride flush 0.9 % injection 10 mL, 10 mL, Intravenous, PRN  0.9 % sodium chloride infusion, 25 mL, Intravenous, PRN  ascorbic acid (VITAMIN C) tablet 500 mg, 500 mg, Oral, Daily  aspirin EC tablet 81 mg, 81 mg, Oral, Daily  isosorbide mononitrate (IMDUR) extended release tablet 30 mg, 30 mg, Oral, Daily  levothyroxine (SYNTHROID) tablet 100 mcg, 100 mcg, Oral, Daily  mesalamine (LIALDA) EC tablet 1.2 g, 1,200 mg, Oral, Daily with breakfast  therapeutic multivitamin-minerals 1 tablet, 1 tablet, Oral, Daily  pantoprazole (PROTONIX) tablet 40 mg, 40 mg, Oral, Daily  sertraline (ZOLOFT) tablet 50 mg, 50 mg, Oral, Nightly  sucralfate (CARAFATE) tablet 1 g, 1 g, Oral, BID  ipratropium (ATROVENT) 0.02 % nebulizer solution 0.5 mg, 0.5 mg, Nebulization, 4x daily **AND** Arformoterol Tartrate (BROVANA) nebulizer solution 15 mcg, 15 mcg, Nebulization, BID  rosuvastatin (CRESTOR) tablet 20 mg, 20 mg, Oral, Nightly  perflutren lipid microspheres (DEFINITY) injection 1.65 mg, 1.5 mL, Intravenous, ONCE PRN  carvedilol (COREG) tablet 6.25 mg, 6.25 mg, Oral, BID WC    ASSESSMENT AND PLAN CAD addressed by cardiology  GI symptoms under control  For esophagogram post stenting  Continue to monitor hgb

## 2021-04-07 ENCOUNTER — APPOINTMENT (OUTPATIENT)
Dept: CT IMAGING | Age: 71
DRG: 287 | End: 2021-04-07
Payer: MEDICARE

## 2021-04-07 LAB
ANION GAP SERPL CALCULATED.3IONS-SCNC: 9 MMOL/L (ref 7–16)
BASOPHILS ABSOLUTE: 0.03 E9/L (ref 0–0.2)
BASOPHILS RELATIVE PERCENT: 0.5 % (ref 0–2)
BLOOD BANK DISPENSE STATUS: NORMAL
BLOOD BANK PRODUCT CODE: NORMAL
BPU ID: NORMAL
BUN BLDV-MCNC: 18 MG/DL (ref 8–23)
CALCIUM SERPL-MCNC: 8.6 MG/DL (ref 8.6–10.2)
CHLORIDE BLD-SCNC: 104 MMOL/L (ref 98–107)
CO2: 26 MMOL/L (ref 22–29)
CREAT SERPL-MCNC: 0.7 MG/DL (ref 0.5–1)
DESCRIPTION BLOOD BANK: NORMAL
EOSINOPHILS ABSOLUTE: 0.14 E9/L (ref 0.05–0.5)
EOSINOPHILS RELATIVE PERCENT: 2.3 % (ref 0–6)
GFR AFRICAN AMERICAN: >60
GFR NON-AFRICAN AMERICAN: >60 ML/MIN/1.73
GLUCOSE BLD-MCNC: 112 MG/DL (ref 74–99)
HCT VFR BLD CALC: 25.4 % (ref 34–48)
HCT VFR BLD CALC: 26.2 % (ref 34–48)
HEMOGLOBIN: 7.7 G/DL (ref 11.5–15.5)
HEMOGLOBIN: 8.1 G/DL (ref 11.5–15.5)
IMMATURE GRANULOCYTES #: 0.03 E9/L
IMMATURE GRANULOCYTES %: 0.5 % (ref 0–5)
LYMPHOCYTES ABSOLUTE: 0.76 E9/L (ref 1.5–4)
LYMPHOCYTES RELATIVE PERCENT: 12.4 % (ref 20–42)
MCH RBC QN AUTO: 25.8 PG (ref 26–35)
MCHC RBC AUTO-ENTMCNC: 30.3 % (ref 32–34.5)
MCV RBC AUTO: 85.2 FL (ref 80–99.9)
MONOCYTES ABSOLUTE: 0.58 E9/L (ref 0.1–0.95)
MONOCYTES RELATIVE PERCENT: 9.5 % (ref 2–12)
NEUTROPHILS ABSOLUTE: 4.58 E9/L (ref 1.8–7.3)
NEUTROPHILS RELATIVE PERCENT: 74.8 % (ref 43–80)
PDW BLD-RTO: 14.3 FL (ref 11.5–15)
PLATELET # BLD: 173 E9/L (ref 130–450)
PMV BLD AUTO: 11.3 FL (ref 7–12)
POTASSIUM SERPL-SCNC: 4.2 MMOL/L (ref 3.5–5)
RBC # BLD: 2.98 E12/L (ref 3.5–5.5)
SODIUM BLD-SCNC: 139 MMOL/L (ref 132–146)
WBC # BLD: 6.1 E9/L (ref 4.5–11.5)

## 2021-04-07 PROCEDURE — 2580000003 HC RX 258: Performed by: NEUROMUSCULOSKELETAL MEDICINE & OMM

## 2021-04-07 PROCEDURE — 6370000000 HC RX 637 (ALT 250 FOR IP): Performed by: INTERNAL MEDICINE

## 2021-04-07 PROCEDURE — 36415 COLL VENOUS BLD VENIPUNCTURE: CPT

## 2021-04-07 PROCEDURE — 2060000000 HC ICU INTERMEDIATE R&B

## 2021-04-07 PROCEDURE — 80048 BASIC METABOLIC PNL TOTAL CA: CPT

## 2021-04-07 PROCEDURE — 99232 SBSQ HOSP IP/OBS MODERATE 35: CPT | Performed by: INTERNAL MEDICINE

## 2021-04-07 PROCEDURE — 85014 HEMATOCRIT: CPT

## 2021-04-07 PROCEDURE — 85025 COMPLETE CBC W/AUTO DIFF WBC: CPT

## 2021-04-07 PROCEDURE — 94640 AIRWAY INHALATION TREATMENT: CPT

## 2021-04-07 PROCEDURE — 36430 TRANSFUSION BLD/BLD COMPNT: CPT

## 2021-04-07 PROCEDURE — 2580000003 HC RX 258: Performed by: INTERNAL MEDICINE

## 2021-04-07 PROCEDURE — 85018 HEMOGLOBIN: CPT

## 2021-04-07 PROCEDURE — 6370000000 HC RX 637 (ALT 250 FOR IP): Performed by: NEUROMUSCULOSKELETAL MEDICINE & OMM

## 2021-04-07 PROCEDURE — 74176 CT ABD & PELVIS W/O CONTRAST: CPT

## 2021-04-07 PROCEDURE — 6360000002 HC RX W HCPCS: Performed by: NEUROMUSCULOSKELETAL MEDICINE & OMM

## 2021-04-07 RX ORDER — FERROUS SULFATE 325(65) MG
325 TABLET ORAL
Status: DISCONTINUED | OUTPATIENT
Start: 2021-04-07 | End: 2021-04-09 | Stop reason: HOSPADM

## 2021-04-07 RX ORDER — ISOSORBIDE MONONITRATE 60 MG/1
60 TABLET, EXTENDED RELEASE ORAL DAILY
Status: DISCONTINUED | OUTPATIENT
Start: 2021-04-08 | End: 2021-04-09 | Stop reason: HOSPADM

## 2021-04-07 RX ORDER — SODIUM CHLORIDE 9 MG/ML
INJECTION, SOLUTION INTRAVENOUS PRN
Status: COMPLETED | OUTPATIENT
Start: 2021-04-07 | End: 2021-04-07

## 2021-04-07 RX ADMIN — IPRATROPIUM BROMIDE 0.5 MG: 0.5 SOLUTION RESPIRATORY (INHALATION) at 16:04

## 2021-04-07 RX ADMIN — SODIUM CHLORIDE, PRESERVATIVE FREE 10 ML: 5 INJECTION INTRAVENOUS at 12:56

## 2021-04-07 RX ADMIN — IPRATROPIUM BROMIDE 0.5 MG: 0.5 SOLUTION RESPIRATORY (INHALATION) at 20:42

## 2021-04-07 RX ADMIN — Medication 10 ML: at 20:19

## 2021-04-07 RX ADMIN — CARVEDILOL 6.25 MG: 6.25 TABLET, FILM COATED ORAL at 12:54

## 2021-04-07 RX ADMIN — SUCRALFATE 1 G: 1 TABLET ORAL at 17:08

## 2021-04-07 RX ADMIN — SUCRALFATE 1 G: 1 TABLET ORAL at 12:56

## 2021-04-07 RX ADMIN — FERROUS SULFATE TAB 325 MG (65 MG ELEMENTAL FE) 325 MG: 325 (65 FE) TAB at 16:26

## 2021-04-07 RX ADMIN — ASPIRIN 81 MG: 81 TABLET, COATED ORAL at 07:24

## 2021-04-07 RX ADMIN — ISOSORBIDE MONONITRATE 30 MG: 30 TABLET ORAL at 12:55

## 2021-04-07 RX ADMIN — LEVOTHYROXINE SODIUM 100 MCG: 0.1 TABLET ORAL at 12:55

## 2021-04-07 RX ADMIN — SODIUM CHLORIDE: 9 INJECTION, SOLUTION INTRAVENOUS at 16:56

## 2021-04-07 RX ADMIN — ROSUVASTATIN 20 MG: 20 TABLET, FILM COATED ORAL at 20:19

## 2021-04-07 RX ADMIN — OXYCODONE HYDROCHLORIDE AND ACETAMINOPHEN 500 MG: 500 TABLET ORAL at 12:54

## 2021-04-07 RX ADMIN — IPRATROPIUM BROMIDE 0.5 MG: 0.5 SOLUTION RESPIRATORY (INHALATION) at 12:49

## 2021-04-07 RX ADMIN — MESALAMINE 1.2 G: 1.2 TABLET, DELAYED RELEASE ORAL at 12:53

## 2021-04-07 RX ADMIN — Medication 1 TABLET: at 12:55

## 2021-04-07 RX ADMIN — PANTOPRAZOLE SODIUM 40 MG: 40 TABLET, DELAYED RELEASE ORAL at 12:54

## 2021-04-07 RX ADMIN — SERTRALINE 50 MG: 50 TABLET, FILM COATED ORAL at 20:19

## 2021-04-07 RX ADMIN — ARFORMOTEROL TARTRATE 15 MCG: 15 SOLUTION RESPIRATORY (INHALATION) at 20:42

## 2021-04-07 ASSESSMENT — PAIN SCALES - GENERAL
PAINLEVEL_OUTOF10: 0

## 2021-04-07 NOTE — PROGRESS NOTES
Patient in heart cath lab currently for stent placement with heart cath. No acute issues overnight. Not seen face to face today. PCI performed the other day to RCA with good results. Will await heart cath and stent placement. Will follow.

## 2021-04-07 NOTE — CARE COORDINATION
Pt for cardiac cath with stents today. Esophogram normal. Plan remains home at discharge denies any needs.

## 2021-04-07 NOTE — PROGRESS NOTES
Pt in cvl for heart caterization CbC repeated with a drop in H&H pt went for stat cat scan results to Dr Alisson Oneill saw pt case canceled pt tx back to room

## 2021-04-07 NOTE — PROGRESS NOTES
Reason for follow up: Chest pain, abnormal Lexiscan and severe mid RCA stenosis. Subjective: Patient denies chest discomfort or dyspnea. She denies back pain. Objective:    No distress. No events overnight. Scheduled Meds:   sodium chloride flush  10 mL Intravenous 2 times per day    ascorbic acid  500 mg Oral Daily    aspirin  81 mg Oral Daily    isosorbide mononitrate  30 mg Oral Daily    levothyroxine  100 mcg Oral Daily    mesalamine  1,200 mg Oral Daily with breakfast    therapeutic multivitamin-minerals  1 tablet Oral Daily    pantoprazole  40 mg Oral Daily    sertraline  50 mg Oral Nightly    sucralfate  1 g Oral BID    ipratropium  0.5 mg Nebulization 4x daily    And    Arformoterol Tartrate  15 mcg Nebulization BID    rosuvastatin  20 mg Oral Nightly    carvedilol  6.25 mg Oral BID WC       Continuous Infusions:   sodium chloride      sodium chloride      sodium chloride             Intake/Output Summary (Last 24 hours) at 4/7/2021 1253  Last data filed at 4/6/2021 2229  Gross per 24 hour   Intake 240 ml   Output --   Net 240 ml       No data found. PE:   /72   Pulse 72   Temp 97.2 °F (36.2 °C) (Temporal)   Resp 16   Ht 4' 11\" (1.499 m)   Wt 185 lb (83.9 kg)   SpO2 96%   BMI 37.37 kg/m²     CONST: Middle-aged obese female who appears of stated age. Awake, alert, cooperative, no apparent distress. HEENT: Head- normocephalic, atraumatic. Neck:  no jugular venous distention. No carotid bruit noted. LUNGS: Clear. CARDIOVASCULAR:  RRR, very soft systolic murmur, no s3, s4 or rub noted. PV: No lower extremity edema. Minimal bruising over both groins no palpable hematoma. Pedal pulses palpable, no clubbing or cyanosis   ABDOMEN: Soft, non-tender to light palpation. Bowel sounds present. No palpable masses no hepatosplenomegaly or splenomegaly; no abdominal bruit / pulsation  SKIN: Warm and dry .   NEURO / PSYCH: Oriented to

## 2021-04-08 DIAGNOSIS — R00.2 PALPITATIONS: Primary | ICD-10-CM

## 2021-04-08 PROBLEM — T82.837A RETROPERITONEAL HEMORRHAGE COMPLICATING CARDIAC CATHETERIZATION: Status: ACTIVE | Noted: 2021-04-08

## 2021-04-08 LAB
ANION GAP SERPL CALCULATED.3IONS-SCNC: 8 MMOL/L (ref 7–16)
BASOPHILS ABSOLUTE: 0.03 E9/L (ref 0–0.2)
BASOPHILS RELATIVE PERCENT: 0.5 % (ref 0–2)
BUN BLDV-MCNC: 16 MG/DL (ref 8–23)
CALCIUM SERPL-MCNC: 8.3 MG/DL (ref 8.6–10.2)
CHLORIDE BLD-SCNC: 106 MMOL/L (ref 98–107)
CO2: 28 MMOL/L (ref 22–29)
CREAT SERPL-MCNC: 0.7 MG/DL (ref 0.5–1)
EOSINOPHILS ABSOLUTE: 0.14 E9/L (ref 0.05–0.5)
EOSINOPHILS RELATIVE PERCENT: 2.3 % (ref 0–6)
GFR AFRICAN AMERICAN: >60
GFR NON-AFRICAN AMERICAN: >60 ML/MIN/1.73
GLUCOSE BLD-MCNC: 112 MG/DL (ref 74–99)
HCT VFR BLD CALC: 26.1 % (ref 34–48)
HEMOGLOBIN: 8.3 G/DL (ref 11.5–15.5)
IMMATURE GRANULOCYTES #: 0.05 E9/L
IMMATURE GRANULOCYTES %: 0.8 % (ref 0–5)
LYMPHOCYTES ABSOLUTE: 0.88 E9/L (ref 1.5–4)
LYMPHOCYTES RELATIVE PERCENT: 14.2 % (ref 20–42)
MCH RBC QN AUTO: 27.1 PG (ref 26–35)
MCHC RBC AUTO-ENTMCNC: 31.8 % (ref 32–34.5)
MCV RBC AUTO: 85.3 FL (ref 80–99.9)
MONOCYTES ABSOLUTE: 0.59 E9/L (ref 0.1–0.95)
MONOCYTES RELATIVE PERCENT: 9.5 % (ref 2–12)
NEUTROPHILS ABSOLUTE: 4.51 E9/L (ref 1.8–7.3)
NEUTROPHILS RELATIVE PERCENT: 72.7 % (ref 43–80)
PDW BLD-RTO: 14.3 FL (ref 11.5–15)
PLATELET # BLD: 162 E9/L (ref 130–450)
PMV BLD AUTO: 11.4 FL (ref 7–12)
POTASSIUM SERPL-SCNC: 4 MMOL/L (ref 3.5–5)
RBC # BLD: 3.06 E12/L (ref 3.5–5.5)
SODIUM BLD-SCNC: 142 MMOL/L (ref 132–146)
WBC # BLD: 6.2 E9/L (ref 4.5–11.5)

## 2021-04-08 PROCEDURE — 6370000000 HC RX 637 (ALT 250 FOR IP): Performed by: INTERNAL MEDICINE

## 2021-04-08 PROCEDURE — 94640 AIRWAY INHALATION TREATMENT: CPT

## 2021-04-08 PROCEDURE — 2060000000 HC ICU INTERMEDIATE R&B

## 2021-04-08 PROCEDURE — 6360000002 HC RX W HCPCS: Performed by: NEUROMUSCULOSKELETAL MEDICINE & OMM

## 2021-04-08 PROCEDURE — 85025 COMPLETE CBC W/AUTO DIFF WBC: CPT

## 2021-04-08 PROCEDURE — 36415 COLL VENOUS BLD VENIPUNCTURE: CPT

## 2021-04-08 PROCEDURE — 2580000003 HC RX 258: Performed by: INTERNAL MEDICINE

## 2021-04-08 PROCEDURE — 6370000000 HC RX 637 (ALT 250 FOR IP): Performed by: NEUROMUSCULOSKELETAL MEDICINE & OMM

## 2021-04-08 PROCEDURE — 80048 BASIC METABOLIC PNL TOTAL CA: CPT

## 2021-04-08 PROCEDURE — 93308 TTE F-UP OR LMTD: CPT

## 2021-04-08 PROCEDURE — 6360000004 HC RX CONTRAST MEDICATION: Performed by: INTERNAL MEDICINE

## 2021-04-08 RX ADMIN — SUCRALFATE 1 G: 1 TABLET ORAL at 08:10

## 2021-04-08 RX ADMIN — MESALAMINE 1.2 G: 1.2 TABLET, DELAYED RELEASE ORAL at 08:11

## 2021-04-08 RX ADMIN — CARVEDILOL 6.25 MG: 6.25 TABLET, FILM COATED ORAL at 08:10

## 2021-04-08 RX ADMIN — Medication 10 ML: at 08:09

## 2021-04-08 RX ADMIN — IPRATROPIUM BROMIDE 0.5 MG: 0.5 SOLUTION RESPIRATORY (INHALATION) at 20:10

## 2021-04-08 RX ADMIN — SUCRALFATE 1 G: 1 TABLET ORAL at 16:56

## 2021-04-08 RX ADMIN — ARFORMOTEROL TARTRATE 15 MCG: 15 SOLUTION RESPIRATORY (INHALATION) at 07:44

## 2021-04-08 RX ADMIN — ASPIRIN 81 MG: 81 TABLET, COATED ORAL at 08:09

## 2021-04-08 RX ADMIN — ARFORMOTEROL TARTRATE 15 MCG: 15 SOLUTION RESPIRATORY (INHALATION) at 20:10

## 2021-04-08 RX ADMIN — Medication 10 ML: at 21:22

## 2021-04-08 RX ADMIN — PERFLUTREN 1.65 MG: 6.52 INJECTION, SUSPENSION INTRAVENOUS at 10:02

## 2021-04-08 RX ADMIN — FERROUS SULFATE TAB 325 MG (65 MG ELEMENTAL FE) 325 MG: 325 (65 FE) TAB at 08:10

## 2021-04-08 RX ADMIN — LEVOTHYROXINE SODIUM 100 MCG: 0.1 TABLET ORAL at 06:33

## 2021-04-08 RX ADMIN — ISOSORBIDE MONONITRATE 60 MG: 60 TABLET ORAL at 08:10

## 2021-04-08 RX ADMIN — ROSUVASTATIN 20 MG: 20 TABLET, FILM COATED ORAL at 21:22

## 2021-04-08 RX ADMIN — PANTOPRAZOLE SODIUM 40 MG: 40 TABLET, DELAYED RELEASE ORAL at 08:10

## 2021-04-08 RX ADMIN — IPRATROPIUM BROMIDE 0.5 MG: 0.5 SOLUTION RESPIRATORY (INHALATION) at 11:33

## 2021-04-08 RX ADMIN — IPRATROPIUM BROMIDE 0.5 MG: 0.5 SOLUTION RESPIRATORY (INHALATION) at 07:43

## 2021-04-08 RX ADMIN — SERTRALINE 50 MG: 50 TABLET, FILM COATED ORAL at 21:22

## 2021-04-08 RX ADMIN — OXYCODONE HYDROCHLORIDE AND ACETAMINOPHEN 500 MG: 500 TABLET ORAL at 08:10

## 2021-04-08 RX ADMIN — CARVEDILOL 6.25 MG: 6.25 TABLET, FILM COATED ORAL at 16:56

## 2021-04-08 RX ADMIN — Medication: at 08:10

## 2021-04-08 ASSESSMENT — PAIN SCALES - GENERAL
PAINLEVEL_OUTOF10: 0

## 2021-04-08 NOTE — PROGRESS NOTES
LYMPHSABS 0.88 04/08/2021    EOSABS 0.14 04/08/2021    BASOSABS 0.03 04/08/2021     CMP:    Lab Results   Component Value Date     04/08/2021    K 4.0 04/08/2021    K 3.7 08/19/2020     04/08/2021    CO2 28 04/08/2021    BUN 16 04/08/2021    CREATININE 0.7 04/08/2021    GFRAA >60 04/08/2021    LABGLOM >60 04/08/2021    GLUCOSE 112 04/08/2021    GLUCOSE 105 04/22/2011    PROT 8.3 04/02/2021    LABALBU 4.5 04/02/2021    LABALBU 4.5 04/22/2011    CALCIUM 8.3 04/08/2021    BILITOT 0.2 04/02/2021    ALKPHOS 110 04/02/2021    AST 46 04/02/2021    ALT 32 04/02/2021       Current Inpatient Medications    Current Facility-Administered Medications: isosorbide mononitrate (IMDUR) extended release tablet 60 mg, 60 mg, Oral, Daily  ferrous sulfate (IRON 325) tablet 325 mg, 325 mg, Oral, Daily with breakfast  0.9 % sodium chloride infusion, , Intravenous, Continuous  0.9 % sodium chloride infusion, , Intravenous, Continuous  sodium chloride flush 0.9 % injection 10 mL, 10 mL, Intravenous, 2 times per day  sodium chloride flush 0.9 % injection 10 mL, 10 mL, Intravenous, PRN  acetaminophen (TYLENOL) tablet 650 mg, 650 mg, Oral, Q4H PRN  sodium chloride flush 0.9 % injection 10 mL, 10 mL, Intravenous, PRN  0.9 % sodium chloride infusion, 25 mL, Intravenous, PRN  ascorbic acid (VITAMIN C) tablet 500 mg, 500 mg, Oral, Daily  aspirin EC tablet 81 mg, 81 mg, Oral, Daily  levothyroxine (SYNTHROID) tablet 100 mcg, 100 mcg, Oral, Daily  mesalamine (LIALDA) EC tablet 1.2 g, 1,200 mg, Oral, Daily with breakfast  therapeutic multivitamin-minerals 1 tablet, 1 tablet, Oral, Daily  pantoprazole (PROTONIX) tablet 40 mg, 40 mg, Oral, Daily  sertraline (ZOLOFT) tablet 50 mg, 50 mg, Oral, Nightly  sucralfate (CARAFATE) tablet 1 g, 1 g, Oral, BID  ipratropium (ATROVENT) 0.02 % nebulizer solution 0.5 mg, 0.5 mg, Nebulization, 4x daily **AND** Arformoterol Tartrate (BROVANA) nebulizer solution 15 mcg, 15 mcg, Nebulization, BID  rosuvastatin (CRESTOR) tablet 20 mg, 20 mg, Oral, Nightly  carvedilol (COREG) tablet 6.25 mg, 6.25 mg, Oral, BID WC    ASSESSMENT AND PLAN retroperitoneal bleed post procedure  HGB holding post procedure  Monitor and possible discharge in AM if no hemogram changes

## 2021-04-08 NOTE — PROGRESS NOTES
General Progress Note  4/8/2021 10:07 AM  Subjective:   Admit Date: 4/1/2021  PCP: Bryan Mitchell MD  Interval History: events of yesterday noted. Given 1 unit of PRBC yesterday. Stent placement canceled, and patient to come back in 2 weeks. SOB with exertion. No chest pain. Currently awake and alert. Diet: DIET CARDIAC;  Pain is:None  Nausea:None  Bowel Movement/Flatus yes    Data:   Scheduled Meds:   isosorbide mononitrate  60 mg Oral Daily    ferrous sulfate  325 mg Oral Daily with breakfast    sodium chloride flush  10 mL Intravenous 2 times per day    ascorbic acid  500 mg Oral Daily    aspirin  81 mg Oral Daily    levothyroxine  100 mcg Oral Daily    mesalamine  1,200 mg Oral Daily with breakfast    therapeutic multivitamin-minerals  1 tablet Oral Daily    pantoprazole  40 mg Oral Daily    sertraline  50 mg Oral Nightly    sucralfate  1 g Oral BID    ipratropium  0.5 mg Nebulization 4x daily    And    Arformoterol Tartrate  15 mcg Nebulization BID    rosuvastatin  20 mg Oral Nightly    carvedilol  6.25 mg Oral BID WC     Continuous Infusions:   sodium chloride      sodium chloride      sodium chloride       PRN Meds:sodium chloride flush, acetaminophen, sodium chloride flush, sodium chloride  I/O last 3 completed shifts: In: 1350 [P.O.:960; I.V.:40; Blood:350]  Out: -   No intake/output data recorded.     Intake/Output Summary (Last 24 hours) at 4/8/2021 1007  Last data filed at 4/7/2021 1746  Gross per 24 hour   Intake 1350 ml   Output --   Net 1350 ml       CBC with Differential:    Lab Results   Component Value Date    WBC 6.2 04/08/2021    RBC 3.06 04/08/2021    HGB 8.3 04/08/2021    HCT 26.1 04/08/2021     04/08/2021    MCV 85.3 04/08/2021    MCH 27.1 04/08/2021    MCHC 31.8 04/08/2021    RDW 14.3 04/08/2021    SEGSPCT 91 04/22/2011    LYMPHOPCT 14.2 04/08/2021    MONOPCT 9.5 04/08/2021    BASOPCT 0.5 04/08/2021    MONOSABS 0.59 04/08/2021    LYMPHSABS 0.88 04/08/2021    EOSABS 0.14 04/08/2021    BASOSABS 0.03 04/08/2021     CMP:    Lab Results   Component Value Date     04/08/2021    K 4.0 04/08/2021    K 3.7 08/19/2020     04/08/2021    CO2 28 04/08/2021    BUN 16 04/08/2021    CREATININE 0.7 04/08/2021    GFRAA >60 04/08/2021    LABGLOM >60 04/08/2021    GLUCOSE 112 04/08/2021    GLUCOSE 105 04/22/2011    PROT 8.3 04/02/2021    LABALBU 4.5 04/02/2021    LABALBU 4.5 04/22/2011    CALCIUM 8.3 04/08/2021    BILITOT 0.2 04/02/2021    ALKPHOS 110 04/02/2021    AST 46 04/02/2021    ALT 32 04/02/2021     Magnesium:  No results found for: MG  Phosphorus:    Lab Results   Component Value Date    PHOS 3.1 02/02/2021     PT/INR:    Lab Results   Component Value Date    PROTIME 10.9 04/02/2021    PROTIME 11.9 04/22/2011    INR 1.0 04/02/2021     Last 3 Troponin:    Lab Results   Component Value Date    TROPONINI 0.04 04/02/2021    TROPONINI 0.03 04/02/2021    TROPONINI <0.01 08/20/2020     U/A:    Lab Results   Component Value Date    COLORU Yellow 12/10/2014    PHUR 6.0 12/10/2014    WBCUA >20 12/10/2014    WBCUA 2-5 04/22/2011    RBCUA 0-1 12/10/2014    RBCUA 1-3 04/22/2011    BACTERIA MANY 12/10/2014    CLARITYU TURBID 12/10/2014    SPECGRAV 1.015 12/10/2014    LEUKOCYTESUR MODERATE 12/10/2014    UROBILINOGEN 0.2 12/10/2014    BILIRUBINUR Negative 12/10/2014    BILIRUBINUR NEGATIVE 04/22/2011    BLOODU SMALL 12/10/2014    GLUCOSEU Negative 12/10/2014    GLUCOSEU NEGATIVE 04/22/2011     HgBA1c:  No components found for: HGBA1C  TSH:    Lab Results   Component Value Date    TSH 0.836 01/11/2021     -----------------------------------------------------------------    Objective:   Vitals: BP (!) 116/59   Pulse 76   Temp 98.2 °F (36.8 °C) (Oral)   Resp 16   Ht 4' 11\" (1.499 m)   Wt 185 lb (83.9 kg)   SpO2 93%   BMI 37.37 kg/m²   General appearance: alert, appears stated age and cooperative  Skin: Skin color, texture, turgor normal. No rashes or lesions  HEENT: Head: Normal, normocephalic, atraumatic. Neck: no adenopathy, no carotid bruit, no JVD, supple, symmetrical, trachea midline and thyroid not enlarged, symmetric, no tenderness/mass/nodules  Lungs: diminished breath sounds bibasilar  Heart: regular rate and rhythm, S1, S2 normal, no murmur, click, rub or gallop  Abdomen: soft, non-tender; bowel sounds normal; no masses,  no organomegaly  Extremities: extremities normal, atraumatic, no cyanosis or edema  Neurologic: Mental status: Alert, oriented, thought content appropriate          Saud Richards MD   Physician   Vascular   Op Note   Signed   Date of Service:  4/19/2021  1:00 PM         Related encounter: Echocardiogram from 4/19/2021 in [address]         Signed             Operative Note        Patient: Summer Powell  YOB: 1950  MRN: [id number]     Date of procedure: 4/5/2021     Preoperative diagnosis: Chest pain. And concern for retroperitoneal bleeding with common femoral artery injury     Postoperative diagnosis: Same with identification of extravasation of the puncture site of the common femoral vessel. This later stopped with manual pressure.     Findings: Abdominal aorta is widely patent. Bilateral common iliacs widely patent. Bilateral hypogastrics are widely patent. Bilateral common femorals are widely patent bilateral profundus are widely patent.     Right extremity demonstrates a area of extravasation on most likely the posterior wall of the right common femoral artery.     Surgeon: Devan Foster     Assistant: None     Anesthesia: Conscious sedation provided by cardiology     Estimated blood loss: Minimal     Procedure:  #1 ultrasound-guided needle access of the left common femoral artery  #2 abdominal aortogram with right lower extremity runoff catheter position placed at the common femoral vessel in the right side     Description of the procedure: Secondary the fact that this was an emergent procedure.   She had already been prepped and draped in the standard fashion. Pressure had already been held on the right side. Under ultrasound guidance local lidocaine was used for infiltration of skin and subcutaneous tissue for pain control. A micropuncture needle was inserted on the anterior wall the common femoral followed by micropuncture wire micropuncture sheath. A standard J-wire was placed. We then upsized to a 4 Western Libra sheath/flushed with heparinized saline solution a pigtail catheter was taken at the aortic bifurcation. Demonstrating the above findings.     We then crossed over the aortic bifurcation with a pigtail catheter and a Bentson wire. Images were taken of the right extremity in multiple projections. This demonstrated extravasation of contrast.     At this point she was stable. She had some back pain and some mild groin pain. We then held pressure for an additional 15 to 20 minutes. Additional imaging demonstrated very little contrast extravasation. We continued to hold pressure. Multiple projections were then performed. Demonstrating no evidence of extravasation. There appeared to be maybe possibly a very small pseudoaneurysm but no active leaking. At this point cardiology finished their cardiac catheterization from the left-sided access site. Upon completion of the case prior to pulling the left-sided sheath I crossed over the aortic bifurcation with a pigtail catheter and a Glidewire advantage. Images were taken of the right extremity and at this entirety. There was no active extravasation of contrast.  The common femorals patent the profundus patent the SFA was patent there is some mild disease noted in the mid SFA approximately 30% in multiple areas. But otherwise widely patent. The popliteal is patent. There is three-vessel runoff to the foot.     At this point there was no active extravasation of the right common femoral therefore no plan for additional intervention.   She is to lay flat all night and rest.  Cardiology will bring her back for elective cardiac catheterization for her coronary disease.     They will pull and hold the left sided sheath and also hold the right side at the same time. They will call if is any questions or concerns or any issues.     Detailed Description of Procedure:         Electronically signed by Royer Grant MD on 4/5/2021 at 5:19 PM               Routing History                            Assessment:   Principal Problem:    Chest pain  Active Problems:    CVA (cerebral vascular accident) ([facility] [address]    Hypertension    Hypothyroidism    Nonischemic cardiomyopathy ([facility] [address]    ROSALVA (obstructive sleep apnea)    Class 2 severe obesity due to excess calories with serious comorbidity and body mass index (BMI) of 39.0 to 39.9 in adult [facility])    Coronary artery disease involving native coronary artery of native heart without angina pectoris    Tracheomalacia    Retroperitoneal hemorrhage complicating cardiac catheterization  Resolved Problems:    * No resolved hospital problems. *    Plan:   1. Monitor H/H.  2. Home tomorrow, if H/H stays stable. 3. Will follow.     Juanito Frausto D.O.  10:07 AM  4/8/2021

## 2021-04-09 VITALS
DIASTOLIC BLOOD PRESSURE: 66 MMHG | HEIGHT: 59 IN | HEART RATE: 73 BPM | TEMPERATURE: 98.5 F | WEIGHT: 185 LBS | OXYGEN SATURATION: 96 % | BODY MASS INDEX: 37.29 KG/M2 | RESPIRATION RATE: 18 BRPM | SYSTOLIC BLOOD PRESSURE: 107 MMHG

## 2021-04-09 LAB
HCT VFR BLD CALC: 27.2 % (ref 34–48)
HEMOGLOBIN: 8.3 G/DL (ref 11.5–15.5)
MCH RBC QN AUTO: 26.4 PG (ref 26–35)
MCHC RBC AUTO-ENTMCNC: 30.5 % (ref 32–34.5)
MCV RBC AUTO: 86.6 FL (ref 80–99.9)
PDW BLD-RTO: 14.2 FL (ref 11.5–15)
PLATELET # BLD: 173 E9/L (ref 130–450)
PMV BLD AUTO: 11.8 FL (ref 7–12)
RBC # BLD: 3.14 E12/L (ref 3.5–5.5)
WBC # BLD: 5.9 E9/L (ref 4.5–11.5)

## 2021-04-09 PROCEDURE — 36415 COLL VENOUS BLD VENIPUNCTURE: CPT

## 2021-04-09 PROCEDURE — 6360000002 HC RX W HCPCS: Performed by: NEUROMUSCULOSKELETAL MEDICINE & OMM

## 2021-04-09 PROCEDURE — 2580000003 HC RX 258: Performed by: INTERNAL MEDICINE

## 2021-04-09 PROCEDURE — 94640 AIRWAY INHALATION TREATMENT: CPT

## 2021-04-09 PROCEDURE — 6370000000 HC RX 637 (ALT 250 FOR IP): Performed by: NEUROMUSCULOSKELETAL MEDICINE & OMM

## 2021-04-09 PROCEDURE — 6370000000 HC RX 637 (ALT 250 FOR IP): Performed by: INTERNAL MEDICINE

## 2021-04-09 PROCEDURE — 85027 COMPLETE CBC AUTOMATED: CPT

## 2021-04-09 RX ORDER — ROSUVASTATIN CALCIUM 20 MG/1
20 TABLET, COATED ORAL NIGHTLY
Qty: 30 TABLET | Refills: 3 | Status: SHIPPED | OUTPATIENT
Start: 2021-04-09 | End: 2021-08-03 | Stop reason: SDUPTHER

## 2021-04-09 RX ORDER — FERROUS SULFATE 325(65) MG
325 TABLET ORAL
Qty: 30 TABLET | Refills: 3 | Status: SHIPPED | OUTPATIENT
Start: 2021-04-09 | End: 2021-08-09 | Stop reason: SDUPTHER

## 2021-04-09 RX ADMIN — ASPIRIN 81 MG: 81 TABLET, COATED ORAL at 08:59

## 2021-04-09 RX ADMIN — OXYCODONE HYDROCHLORIDE AND ACETAMINOPHEN 500 MG: 500 TABLET ORAL at 08:58

## 2021-04-09 RX ADMIN — Medication 10 ML: at 08:59

## 2021-04-09 RX ADMIN — Medication 1 TABLET: at 08:58

## 2021-04-09 RX ADMIN — PANTOPRAZOLE SODIUM 40 MG: 40 TABLET, DELAYED RELEASE ORAL at 08:58

## 2021-04-09 RX ADMIN — LEVOTHYROXINE SODIUM 100 MCG: 0.1 TABLET ORAL at 06:06

## 2021-04-09 RX ADMIN — MESALAMINE 1.2 G: 1.2 TABLET, DELAYED RELEASE ORAL at 08:59

## 2021-04-09 RX ADMIN — FERROUS SULFATE TAB 325 MG (65 MG ELEMENTAL FE) 325 MG: 325 (65 FE) TAB at 08:58

## 2021-04-09 RX ADMIN — CARVEDILOL 6.25 MG: 6.25 TABLET, FILM COATED ORAL at 08:59

## 2021-04-09 RX ADMIN — IPRATROPIUM BROMIDE 0.5 MG: 0.5 SOLUTION RESPIRATORY (INHALATION) at 08:46

## 2021-04-09 RX ADMIN — ARFORMOTEROL TARTRATE 15 MCG: 15 SOLUTION RESPIRATORY (INHALATION) at 08:46

## 2021-04-09 RX ADMIN — ISOSORBIDE MONONITRATE 60 MG: 60 TABLET ORAL at 08:59

## 2021-04-09 RX ADMIN — SUCRALFATE 1 G: 1 TABLET ORAL at 08:58

## 2021-04-09 ASSESSMENT — PAIN SCALES - GENERAL
PAINLEVEL_OUTOF10: 0

## 2021-04-09 NOTE — PROGRESS NOTES
Department of General Surgery - Adult  Surgical Service   Attending Progress Note      SUBJECTIVE:  Relatively comfortable     OBJECTIVE  HGB stable mild SOB    Physical    VITALS:  /71   Pulse 72   Temp 98.6 °F (37 °C) (Oral)   Resp 18   Ht 4' 11\" (1.499 m)   Wt 185 lb (83.9 kg)   SpO2 92%   BMI 37.37 kg/m²   INTAKE/OUTPUT:    Intake/Output Summary (Last 24 hours) at 2021 1050  Last data filed at 2021 0935  Gross per 24 hour   Intake 1260 ml   Output 0 ml   Net 1260 ml     TEMPERATURE:  Current - Temp: 98.6 °F (37 °C);  Max - Temp  Av °F (36.7 °C)  Min: 97.2 °F (36.2 °C)  Max: 98.6 °F (37 °C)  RESPIRATIONS RANGE: Resp  Av  Min: 16  Max: 18  PULSE RANGE: Pulse  Av.8  Min: 72  Max: 76  BLOOD PRESSURE RANGE:  Systolic (18VUV), YRQ:105 , Min:118 , MRN:535   ; Diastolic (90CRT), DSU:46, Min:56, Max:71    PULSE OXIMETRY RANGE: SpO2  Av.3 %  Min: 92 %  Max: 94 %  CONSTITUTIONAL:  awake, alert, cooperative, no apparent distress, appears older than stated age and morbidly obese  EYES:  lids and lashes normal, pupils equal, round and reactive to light and extra-ocular muscles intact  NECK:  supple, symmetrical, trachea midline, skin normal and no stridor  LUNGS:  no increased work of breathing, good air exchange, no retractions and diminished breath sounds right base and left base  CARDIOVASCULAR:  normal apical pulses, regular rate and rhythm and normal S1 and S2  ABDOMEN:  normal bowel sounds, soft, non-distended and tenderness noted in the right lower quadrant and diminished  Data  CBC with Differential:    Lab Results   Component Value Date    WBC 5.9 2021    RBC 3.14 2021    HGB 8.3 2021    HCT 27.2 2021     2021    MCV 86.6 2021    MCH 26.4 2021    MCHC 30.5 2021    RDW 14.2 2021    SEGSPCT 91 2011    LYMPHOPCT 14.2 2021    MONOPCT 9.5 2021    BASOPCT 0.5 2021    MONOSABS 0.59 2021 LYMPHSABS 0.88 04/08/2021    EOSABS 0.14 04/08/2021    BASOSABS 0.03 04/08/2021     CMP:    Lab Results   Component Value Date     04/08/2021    K 4.0 04/08/2021    K 3.7 08/19/2020     04/08/2021    CO2 28 04/08/2021    BUN 16 04/08/2021    CREATININE 0.7 04/08/2021    GFRAA >60 04/08/2021    LABGLOM >60 04/08/2021    GLUCOSE 112 04/08/2021    GLUCOSE 105 04/22/2011    PROT 8.3 04/02/2021    LABALBU 4.5 04/02/2021    LABALBU 4.5 04/22/2011    CALCIUM 8.3 04/08/2021    BILITOT 0.2 04/02/2021    ALKPHOS 110 04/02/2021    AST 46 04/02/2021    ALT 32 04/02/2021       Current Inpatient Medications    Current Facility-Administered Medications: isosorbide mononitrate (IMDUR) extended release tablet 60 mg, 60 mg, Oral, Daily  ferrous sulfate (IRON 325) tablet 325 mg, 325 mg, Oral, Daily with breakfast  0.9 % sodium chloride infusion, , Intravenous, Continuous  0.9 % sodium chloride infusion, , Intravenous, Continuous  sodium chloride flush 0.9 % injection 10 mL, 10 mL, Intravenous, 2 times per day  sodium chloride flush 0.9 % injection 10 mL, 10 mL, Intravenous, PRN  acetaminophen (TYLENOL) tablet 650 mg, 650 mg, Oral, Q4H PRN  sodium chloride flush 0.9 % injection 10 mL, 10 mL, Intravenous, PRN  0.9 % sodium chloride infusion, 25 mL, Intravenous, PRN  ascorbic acid (VITAMIN C) tablet 500 mg, 500 mg, Oral, Daily  aspirin EC tablet 81 mg, 81 mg, Oral, Daily  levothyroxine (SYNTHROID) tablet 100 mcg, 100 mcg, Oral, Daily  mesalamine (LIALDA) EC tablet 1.2 g, 1,200 mg, Oral, Daily with breakfast  therapeutic multivitamin-minerals 1 tablet, 1 tablet, Oral, Daily  pantoprazole (PROTONIX) tablet 40 mg, 40 mg, Oral, Daily  sertraline (ZOLOFT) tablet 50 mg, 50 mg, Oral, Nightly  sucralfate (CARAFATE) tablet 1 g, 1 g, Oral, BID  ipratropium (ATROVENT) 0.02 % nebulizer solution 0.5 mg, 0.5 mg, Nebulization, 4x daily **AND** Arformoterol Tartrate (BROVANA) nebulizer solution 15 mcg, 15 mcg, Nebulization, BID  rosuvastatin (CRESTOR) tablet 20 mg, 20 mg, Oral, Nightly  carvedilol (COREG) tablet 6.25 mg, 6.25 mg, Oral, BID WC    ASSESSMENT AND PLAN HGB holding   No SOB except on exertion  Dysphagia paradoxical reassess as out patient post cath and stenting  Possible discharge today

## 2021-04-09 NOTE — PROGRESS NOTES
General Progress Note  4/9/2021 8:38 AM  Subjective:   Admit Date: 4/1/2021  PCP: Sonya Crook MD  Interval History: no acute issues overnight. Hemoglobin stable this a.m. at 8.3. She denies any chest pain or SOB. Denies weakness, and or fatigue. Walking to the bathroom without difficulty. Diet: DIET CARDIAC;  Pain is:None  Nausea:None  Bowel Movement/Flatus yes    Data:   Scheduled Meds:   isosorbide mononitrate  60 mg Oral Daily    ferrous sulfate  325 mg Oral Daily with breakfast    sodium chloride flush  10 mL Intravenous 2 times per day    ascorbic acid  500 mg Oral Daily    aspirin  81 mg Oral Daily    levothyroxine  100 mcg Oral Daily    mesalamine  1,200 mg Oral Daily with breakfast    therapeutic multivitamin-minerals  1 tablet Oral Daily    pantoprazole  40 mg Oral Daily    sertraline  50 mg Oral Nightly    sucralfate  1 g Oral BID    ipratropium  0.5 mg Nebulization 4x daily    And    Arformoterol Tartrate  15 mcg Nebulization BID    rosuvastatin  20 mg Oral Nightly    carvedilol  6.25 mg Oral BID WC     Continuous Infusions:   sodium chloride      sodium chloride      sodium chloride       PRN Meds:sodium chloride flush, acetaminophen, sodium chloride flush, sodium chloride  I/O last 3 completed shifts: In: 1440 [P.O.:1440]  Out: 0   No intake/output data recorded.     Intake/Output Summary (Last 24 hours) at 4/9/2021 0838  Last data filed at 4/8/2021 2222  Gross per 24 hour   Intake 1080 ml   Output 0 ml   Net 1080 ml       CBC with Differential:    Lab Results   Component Value Date    WBC 5.9 04/09/2021    RBC 3.14 04/09/2021    HGB 8.3 04/09/2021    HCT 27.2 04/09/2021     04/09/2021    MCV 86.6 04/09/2021    MCH 26.4 04/09/2021    MCHC 30.5 04/09/2021    RDW 14.2 04/09/2021    SEGSPCT 91 04/22/2011    LYMPHOPCT 14.2 04/08/2021    MONOPCT 9.5 04/08/2021    BASOPCT 0.5 04/08/2021    MONOSABS 0.59 04/08/2021    LYMPHSABS 0.88 04/08/2021    EOSABS 0.14 04/08/2021    LUCY 0.03 04/08/2021     CMP:    Lab Results   Component Value Date     04/08/2021    K 4.0 04/08/2021    K 3.7 08/19/2020     04/08/2021    CO2 28 04/08/2021    BUN 16 04/08/2021    CREATININE 0.7 04/08/2021    GFRAA >60 04/08/2021    LABGLOM >60 04/08/2021    GLUCOSE 112 04/08/2021    GLUCOSE 105 04/22/2011    PROT 8.3 04/02/2021    LABALBU 4.5 04/02/2021    LABALBU 4.5 04/22/2011    CALCIUM 8.3 04/08/2021    BILITOT 0.2 04/02/2021    ALKPHOS 110 04/02/2021    AST 46 04/02/2021    ALT 32 04/02/2021     Magnesium:  No results found for: MG  Phosphorus:    Lab Results   Component Value Date    PHOS 3.1 02/02/2021     PT/INR:    Lab Results   Component Value Date    PROTIME 10.9 04/02/2021    PROTIME 11.9 04/22/2011    INR 1.0 04/02/2021     Last 3 Troponin:    Lab Results   Component Value Date    TROPONINI 0.04 04/02/2021    TROPONINI 0.03 04/02/2021    TROPONINI <0.01 08/20/2020     U/A:    Lab Results   Component Value Date    COLORU Yellow 12/10/2014    PHUR 6.0 12/10/2014    WBCUA >20 12/10/2014    WBCUA 2-5 04/22/2011    RBCUA 0-1 12/10/2014    RBCUA 1-3 04/22/2011    BACTERIA MANY 12/10/2014    CLARITYU TURBID 12/10/2014    SPECGRAV 1.015 12/10/2014    LEUKOCYTESUR MODERATE 12/10/2014    UROBILINOGEN 0.2 12/10/2014    BILIRUBINUR Negative 12/10/2014    BILIRUBINUR NEGATIVE 04/22/2011    BLOODU SMALL 12/10/2014    GLUCOSEU Negative 12/10/2014    GLUCOSEU NEGATIVE 04/22/2011     HgBA1c:  No components found for: HGBA1C  TSH:    Lab Results   Component Value Date    TSH 0.836 01/11/2021     -----------------------------------------------------------------    Objective:   Vitals: /71   Pulse 72   Temp 98.6 °F (37 °C) (Oral)   Resp 18   Ht 4' 11\" (1.499 m)   Wt 185 lb (83.9 kg)   SpO2 92%   BMI 37.37 kg/m²   General appearance: alert, appears stated age and cooperative  Skin: Skin color, texture, turgor normal. No rashes or lesions  HEENT: Head: Normal, normocephalic, atraumatic.   Neck: no adenopathy, no carotid bruit, no JVD, supple, symmetrical, trachea midline and thyroid not enlarged, symmetric, no tenderness/mass/nodules  Lungs: clear to auscultation bilaterally  Heart: regular rate and rhythm, S1, S2 normal, no murmur, click, rub or gallop  Abdomen: soft, non-tender; bowel sounds normal; no masses,  no organomegaly  Extremities: extremities normal, atraumatic, no cyanosis or edema  Neurologic: Mental status: Alert, oriented, thought content appropriate    Assessment:   Principal Problem:    Chest pain  Active Problems:    CVA (cerebral vascular accident) ([facility] [address]    Hypertension    Hypothyroidism    Nonischemic cardiomyopathy (HCC)    ROSALVA (obstructive sleep apnea)    Class 2 severe obesity due to excess calories with serious comorbidity and body mass index (BMI) of 39.0 to 39.9 in adult [facility])    Coronary artery disease involving native coronary artery of native heart without angina pectoris    Tracheomalacia    Retroperitoneal hemorrhage complicating cardiac catheterization  Resolved Problems:    * No resolved hospital problems. *    Plan:   1. Ileana Martin for discharge to home today.     Annamaria Sneed D.O.  8:38 AM  4/9/2021

## 2021-04-09 NOTE — PROGRESS NOTES
CLINICAL PHARMACY NOTE: MEDS TO [address] Select Patient?: No  Total # of Prescriptions Filled: 2   The following medications were delivered to the patient:  · ferosul 325 mg  · Rosuvastatin calcium 20 mg  Total # of Interventions Completed: 3  Time Spent (min): 15    Additional Documentation:

## 2021-04-10 ENCOUNTER — CARE COORDINATION (OUTPATIENT)
Dept: CASE MANAGEMENT | Age: 71
End: 2021-04-10

## 2021-04-10 NOTE — CARE COORDINATION
administration of home medications. 1111F not entered as non [facility] PCP. CTN also confirmed with patient stopped medications of aspirin and zocor as per AVS.  Advised obtaining a 90-day supply of all daily and as-needed medications. Covid Risk Education    Patient has following risk factors of: HTN. Education provided regarding infection prevention, and signs and symptoms of COVID-19 and when to seek medical attention with patient who verbalized understanding. Discussed exposure protocols and quarantine From CDC: Are you at higher risk for severe illness?   and given an opportunity for questions and concerns. The patient agrees to contact the COVID-19 hotline [phone] or PCP office for questions related to COVID-19. For more information on steps you can take to protect yourself, see CDC's How to Protect Yourself       Discussed follow-up appointments. If no appointment was previously scheduled, appointment scheduling offered: patient to call on Monday(4/12/21). Is follow up appointment scheduled within 7 days of discharge? No  Non-[facility] follow up appointment(s): none. Non-face-to-face services provided:  Scheduled appointment with Specialist-patient states she will call monday(4/12/21) for interventional cardiology and general surgery appointments.   Obtained and reviewed discharge summary and/or continuity of care documents    Care Transitions 24 Hour Call    Do you have a copy of your discharge instructions?: Yes  Do you have all of your prescriptions and are they filled?: Yes  Have you been contacted by a [address]?: No  Have you scheduled your follow up appointment?: No  Were you discharged with any Home Care or Post Acute Services: No  Do you feel like you have everything you need to keep you well at home?: Yes  Care Transitions Interventions  No Identified Needs       -Spoke with patient for non [facility] PCP care transition call post hospital discharge.  -Patient doing well, states she

## 2021-04-12 DIAGNOSIS — I25.10 CORONARY ARTERY DISEASE INVOLVING NATIVE HEART, ANGINA PRESENCE UNSPECIFIED, UNSPECIFIED VESSEL OR LESION TYPE: Primary | ICD-10-CM

## 2021-04-12 DIAGNOSIS — Z01.812 ENCOUNTER FOR PREPROCEDURE SCREENING LABORATORY TESTING FOR COVID-19: ICD-10-CM

## 2021-04-12 DIAGNOSIS — Z20.822 ENCOUNTER FOR PREPROCEDURE SCREENING LABORATORY TESTING FOR COVID-19: ICD-10-CM

## 2021-04-12 NOTE — DISCHARGE SUMMARY
Family Practice Discharge Summary    Kayla Leal  :  1950  MRN:  [id number]    Admit date:  2021  Discharge date:  2021    Admitting Physician:  Jayme Albert DO    Discharge Diagnoses:    Patient Active Problem List   Diagnosis    PFO (patent foramen ovale)    [facility]    Hypertension    History of thoracic outlet syndrome    Hiatal hernia    Nephrolithiasis    Hypothyroidism    Nonischemic cardiomyopathy ([facility]    ROSALVA (obstructive sleep apnea)    Class 2 severe obesity due to excess calories with serious comorbidity and body mass index (BMI) of 39.0 to 39.9 in [facility])    Coronary artery disease involving native coronary artery of native heart without angina pectoris    Superficial phlebitis and thrombophlebitis of right lower extremity    Severe episode of recurrent major depressive disorder, without psychotic features ([facility] [address]    Chest pain    Tracheomalacia    Retroperitoneal hemorrhage complicating cardiac catheterization       Admission Condition:  fair    Discharged Condition:  fair    Hospital Course: This is a 81-year-old female who presents  to the emergency room with shortness of breath. The patient states that  it was persistent, moderate in severity, and that seem to make it worse  or better. The patient was eating an egg, started choking on it, went  to call for help. She then had a syncopal episode, woke up by herself,  and  was called. She does have a history of coronary artery  disease and a nitroglycerin given to her by EMS en route for her chest  pain, did resolve the pain. She describes the pain is midsternal and  left-sided without radiation. No nausea. No vomiting. No shortness of  breath. Did not break out in sweat. The patient was reportedly hypoxic  with a pulse ox of 89% on room air while en route. The patient believes  that she did pass out at home.   She is also reporting some lower back  pain, but no neck pain. There has been no abdominal pain. No nausea or  vomiting. She does have a history of tracheomalacia, which she sees Dr. Ceci Gordon for. She had a bronchoscopy with the idea of putting a stent in  summer of 2020, it did not proceed accordingly.     Workup in the ER showed that she was afebrile. White count 11.4. Troponin 0.03. Metabolic panel unremarkable except for a sugar of 157. Her alk phos was 110, AST 46. Her proBNP was 592. CT scan of head  without contrast showed no acute abnormalities. CT scan of the lumbar  spine showed no acute posttraumatic abnormalities seen. She has an  L5-S1 facet arthropathy with L5 grade 1 spondylolisthesis and mild disk  bulging, causing mild spinal stenosis. CTA of the chest showed no  evidence of acute pulmonary embolism. She had some small focal  ground-glass nodules in the right middle and lower lobes, but  nonspecific. The patient will be admitted under my service with  consults to Cardiology, [facility] regarding her chest pain with  syncope. She has a history of coronary artery disease and nonischemic  cardiomyopathy. We will also obtain a surgical consult with Dr. Jake Scott regarding her dysphagia and choking sensation. The patient had  seen and follows with Dr. Jake Scott as an outpatient, her last  visit with him was about 2 months ago. She offers no other complaints  at this time.       Discharge Medications:    See medication reconciliation under discharge insructions.     Consults:  cardiology and general surgery    Significant Diagnostic Studies:  angiography:   Germán Nj MD   Physician   Cardiology   Procedures   Signed   Date of Service:  4/5/2021  5:14 PM            Procedure Orders   Cardiac Catheterization [[id number]] ordered by Germán Nj MD at 04/05/21 1157          Signed                         [address] [place] [address], [address] [place] [address]    CARDIAC CATHETERIZATION     PATIENT NAME: Liban Poe                 :        1950  MED REC NO:   [id number]                            ROOM:       4509  ACCOUNT NO:   [de-identified]                           ADMIT DATE: 2021  PROVIDER:     Artemio Frank MD     DATE OF PROCEDURE:  2021     LEFT HEART CATHETERIZATION     INDICATION:  Chest pain with abnormal Lexiscan.     PROCEDURE NOTE:  The patient was brought to the cardiac cath lab in her  usual fasting state. Under sterile condition and under local anesthetic  and using 5-Nigerian micropuncture needle, a 6-Nigerian sheath was inserted  into the right common femoral artery. To mention that the patient had a  left heart catheterization done in 2020 and I could not do the heart  cath from the right radial due to severe occlusion of the  subclavian/brachial artery. Then, I could not advance a J-tip wire or a  Wholey wire to the iliac arteries. Then, a peripheral angiogram  obtained revealed extravasation of contrast lateral to the right common  femoral artery. Manual compression done and Dr. Arnulfo Mays called for concern of  retroperitoneal bleed. Dr. Arnulfo Mays obtained access from the left  common femoral artery using ultrasound guidance and using a 4-Nigerian  sheath. Then, he did an angiogram of the right iliac artery  which revealed no more extravasation of contrast.  He then completed  peripheral angiogram of the right lower extremity. Please see his note  for details. Then, a 4-Nigerian JL-4 diagnostic catheter was advanced to  the ascending aorta without difficulty. The left main coronary artery  was engaged and a coronary angiogram was done. Then, this catheter was  exchanged over a guidewire to a 4-Nigerian JR-4 diagnostic catheter which  was used to engage the right coronary artery and angiogram was done.    This catheter was exchanged over the guidewire to a 4-Nigerian pigtail  which was advanced into the left ventricle without difficulty. Left  ventricular end-diastolic pressure was measured. Left ventriculogram  was done. There was no significant gradient across the aortic valve. At the end of the procedure, pigtail was pulled out. The left groin  sheath was pulled out and manual pressure was applied with good  hemostasis. No hematoma was noted in the  right groin. The patient tolerated the procedure well. No significant blood loss  occurred during the procedure.     FINDINGS:  HEMODYNAMICS:  1. Aortic pressure 117/57 mmHg. 2.  Left ventricular end-diastolic pressure 5 mmHg.     CORONARY ANATOMY:  LEFT MAIN:  It is a large artery with no angiographic stenosis noted.     LAD:  It is a large artery giving rise to two diagonal branches and  several septal perforators. Mild irregularities were noted in the LAD.     LEFT CIRCUMFLEX:  It is a large artery giving rise to large obtuse  marginal branch, then continues to the AV groove. There was 30% to 40%  discrete proximal circumflex stenosis.     RIGHT CORONARY ARTERY:  It is a large dominant artery giving rise to an  RV marginal branch, a PDA and a PLV branch. There was 80% discrete mid  stenosis followed up by 50% discrete eccentric mid stenosis with KAROLINA-3  flow distally.     Left ventriculogram revealed hypokinetic left ventricle with an ejection  fraction of 40% to 45%. No mitral regurgitation was noted.     IMPRESSION:  1.  80% discrete mid RCA stenosis followed up by another 50% discrete  mid stenosis.   2.  Ejection fraction 40% to 45%.     RECOMMENDATION:  Staged PCI of the RCA.     PROCEDURE START TIME:  1527.     PROCEDURE END TIME:  1648.     FLUOROSCOPY TIME:  9.8 minutes.     CONTRAST VOLUME:  110 mL of Optiray.     CONSCIOUS SEDATION:  1 mg of intravenous Versed and 150 mcg of  intravenous fentanyl.           Ramona Martínez MD     D: 04/05/2021 17:11:10       T: 04/05/2021 17:14:58     [email or web address]  Job#: [id number]     Doc#: [id number]     CC:    Last signed by: Joce Ly MD at 4/5/2021  9:35 PM   Revision History                          Routing History        Treatments:   procedures: PCI to RCA. Discharge Exam:  /66   Pulse 73   Temp 98.5 °F (36.9 °C) (Oral)   Resp 18   Ht 4' 11\" (1.499 m)   Wt 185 lb (83.9 kg)   SpO2 96%   BMI 37.37 kg/m²   General appearance: alert, appears stated age and cooperative  Head: Normocephalic, without obvious abnormality, atraumatic  Eyes: conjunctivae/corneas clear. PERRL, EOM's intact. Fundi benign. Throat: lips, mucosa, and tongue normal; teeth and gums normal  Neck: no adenopathy, no carotid bruit, no JVD, supple, symmetrical, trachea midline and thyroid not enlarged, symmetric, no tenderness/mass/nodules  Lungs: clear to auscultation bilaterally  Heart: regular rate and rhythm, S1, S2 normal, no murmur, click, rub or gallop  Abdomen: soft, non-tender; bowel sounds normal; no masses,  no organomegaly  Extremities: extremities normal, atraumatic, no cyanosis or edema  Pulses: 2+ and symmetric  Skin: Skin color, texture, turgor normal. No rashes or lesions  Lymph nodes: Cervical, supraclavicular, and axillary nodes normal.  Neurologic: Alert and oriented X 3, normal strength and tone. Normal symmetric reflexes.  Normal coordination and gait    Disposition:   home       Medication List      START taking these medications    ferrous sulfate 325 (65 Fe) MG tablet  Commonly known as: IRON 325  Take 1 tablet by mouth daily (with breakfast)     rosuvastatin 20 MG tablet  Commonly known as: CRESTOR  Take 1 tablet by mouth nightly        CONTINUE taking these medications    ascorbic acid 500 MG tablet  Commonly known as: VITAMIN C     Bevespi Aerosphere 9-4.8 MCG/ACT Aero  Generic drug: glycopyrrolate-formoterol     carvedilol 3.125 MG tablet  Commonly known as: COREG  Take 1 tablet by mouth 2 times daily (with meals)     Centrum Tabs     fluticasone 50 MCG/ACT nasal spray  Commonly known as: FLONASE isosorbide mononitrate 30 MG extended release tablet  Commonly known as: IMDUR  Take 1 tablet by mouth daily     levothyroxine 100 MCG tablet  Commonly known as: Synthroid  Take 1 tablet by mouth Daily     mesalamine 1.2 g EC tablet  Commonly known as: LIALDA     nitroGLYCERIN 0.4 MG SL tablet  Commonly known as: Nitrostat  Place 1 tablet under the tongue every 5 minutes as needed for Chest pain     omeprazole 20 MG delayed release capsule  Commonly known as: PRILOSEC     sertraline 50 MG tablet  Commonly known as: ZOLOFT  Take 1 tablet by mouth nightly     sucralfate 1 GM tablet  Commonly known as: CARAFATE  Take 1 tablet by mouth 2 times daily        STOP taking these medications    aspirin 81 MG EC tablet     simvastatin 20 MG tablet  Commonly known as: ZOCOR           Where to Get Your Medications      These medications were sent to [facility] [address] [address]    Phone: [phone]   · ferrous sulfate 325 (65 Fe) MG tablet  · rosuvastatin 20 MG tablet              Signed:  Ramsey Scott D.O.  4/12/2021, 6:28 AM

## 2021-04-14 ENCOUNTER — HOSPITAL ENCOUNTER (OUTPATIENT)
Age: 71
Discharge: HOME | End: 2021-04-16
Payer: MEDICARE

## 2021-04-14 DIAGNOSIS — Z20.822 ENCOUNTER FOR PREPROCEDURE SCREENING LABORATORY TESTING FOR COVID-19: ICD-10-CM

## 2021-04-14 DIAGNOSIS — Z01.812 ENCOUNTER FOR PREPROCEDURE SCREENING LABORATORY TESTING FOR COVID-19: ICD-10-CM

## 2021-04-14 PROCEDURE — U0003 INFECTIOUS AGENT DETECTION BY NUCLEIC ACID (DNA OR RNA); SEVERE ACUTE RESPIRATORY SYNDROME CORONAVIRUS 2 (SARS-COV-2) (CORONAVIRUS DISEASE [COVID-19]), AMPLIFIED PROBE TECHNIQUE, MAKING USE OF HIGH THROUGHPUT TECHNOLOGIES AS DESCRIBED BY CMS-2020-01-R: HCPCS

## 2021-04-14 PROCEDURE — U0005 INFEC AGEN DETEC AMPLI PROBE: HCPCS

## 2021-04-16 ENCOUNTER — TELEPHONE (OUTPATIENT)
Dept: CARDIAC CATH/INVASIVE PROCEDURES | Age: 71
End: 2021-04-16

## 2021-04-16 DIAGNOSIS — I25.10 CORONARY ARTERY DISEASE INVOLVING NATIVE HEART WITHOUT ANGINA PECTORIS, UNSPECIFIED VESSEL OR LESION TYPE: ICD-10-CM

## 2021-04-16 LAB
SARS-COV-2: NOT DETECTED
SOURCE: NORMAL

## 2021-04-16 RX ORDER — NITROGLYCERIN 0.4 MG/1
0.4 TABLET SUBLINGUAL EVERY 5 MIN PRN
Qty: 25 TABLET | Refills: 3 | Status: SHIPPED
Start: 2021-04-16 | End: 2022-08-22 | Stop reason: SDUPTHER

## 2021-04-16 NOTE — TELEPHONE ENCOUNTER
Reminded patient of scheduled procedure on 4/16/21. Instructions given and COVID questionnaire completed.

## 2021-04-17 ENCOUNTER — HOSPITAL ENCOUNTER (OUTPATIENT)
Age: 71
Discharge: HOME | DRG: 247 | End: 2021-04-17
Payer: MEDICARE

## 2021-04-17 DIAGNOSIS — I25.10 CORONARY ARTERY DISEASE INVOLVING NATIVE HEART, ANGINA PRESENCE UNSPECIFIED, UNSPECIFIED VESSEL OR LESION TYPE: ICD-10-CM

## 2021-04-17 LAB
ANION GAP SERPL CALCULATED.3IONS-SCNC: 6 MMOL/L (ref 7–16)
BASOPHILS ABSOLUTE: 0.03 E9/L (ref 0–0.2)
BASOPHILS RELATIVE PERCENT: 0.4 % (ref 0–2)
BUN BLDV-MCNC: 16 MG/DL (ref 8–23)
CALCIUM SERPL-MCNC: 9.5 MG/DL (ref 8.6–10.2)
CHLORIDE BLD-SCNC: 101 MMOL/L (ref 98–107)
CO2: 31 MMOL/L (ref 22–29)
CREAT SERPL-MCNC: 0.7 MG/DL (ref 0.5–1)
EOSINOPHILS ABSOLUTE: 0.13 E9/L (ref 0.05–0.5)
EOSINOPHILS RELATIVE PERCENT: 1.8 % (ref 0–6)
GFR AFRICAN AMERICAN: >60
GFR NON-AFRICAN AMERICAN: >60 ML/MIN/1.73
GLUCOSE BLD-MCNC: 155 MG/DL (ref 74–99)
HCT VFR BLD CALC: 29.7 % (ref 34–48)
HEMOGLOBIN: 9.1 G/DL (ref 11.5–15.5)
IMMATURE GRANULOCYTES #: 0.05 E9/L
IMMATURE GRANULOCYTES %: 0.7 % (ref 0–5)
INR BLD: 1.1
LYMPHOCYTES ABSOLUTE: 0.91 E9/L (ref 1.5–4)
LYMPHOCYTES RELATIVE PERCENT: 12.9 % (ref 20–42)
MCH RBC QN AUTO: 26.1 PG (ref 26–35)
MCHC RBC AUTO-ENTMCNC: 30.6 % (ref 32–34.5)
MCV RBC AUTO: 85.3 FL (ref 80–99.9)
MONOCYTES ABSOLUTE: 0.62 E9/L (ref 0.1–0.95)
MONOCYTES RELATIVE PERCENT: 8.8 % (ref 2–12)
NEUTROPHILS ABSOLUTE: 5.31 E9/L (ref 1.8–7.3)
NEUTROPHILS RELATIVE PERCENT: 75.4 % (ref 43–80)
PDW BLD-RTO: 14.6 FL (ref 11.5–15)
PLATELET # BLD: 269 E9/L (ref 130–450)
PMV BLD AUTO: 10.7 FL (ref 7–12)
POTASSIUM SERPL-SCNC: 4.2 MMOL/L (ref 3.5–5)
PROTHROMBIN TIME: 12.3 SEC (ref 9.3–12.4)
RBC # BLD: 3.48 E12/L (ref 3.5–5.5)
SODIUM BLD-SCNC: 138 MMOL/L (ref 132–146)
WBC # BLD: 7.1 E9/L (ref 4.5–11.5)

## 2021-04-17 PROCEDURE — 85610 PROTHROMBIN TIME: CPT

## 2021-04-17 PROCEDURE — 36415 COLL VENOUS BLD VENIPUNCTURE: CPT

## 2021-04-17 PROCEDURE — 85025 COMPLETE CBC W/AUTO DIFF WBC: CPT

## 2021-04-17 PROCEDURE — 80048 BASIC METABOLIC PNL TOTAL CA: CPT

## 2021-04-19 ENCOUNTER — HOSPITAL ENCOUNTER (OUTPATIENT)
Dept: CARDIOLOGY | Age: 71
Discharge: HOME | DRG: 247 | End: 2021-04-19
Payer: MEDICARE

## 2021-04-19 ENCOUNTER — HOSPITAL ENCOUNTER (INPATIENT)
Dept: CARDIAC CATH/INVASIVE PROCEDURES | Age: 71
LOS: 1 days | Discharge: HOME | DRG: 247 | End: 2021-04-20
Attending: NEUROMUSCULOSKELETAL MEDICINE & OMM | Admitting: NEUROMUSCULOSKELETAL MEDICINE & OMM
Payer: MEDICARE

## 2021-04-19 DIAGNOSIS — I25.10 CAD IN NATIVE ARTERY: ICD-10-CM

## 2021-04-19 DIAGNOSIS — I25.10 CORONARY ARTERY DISEASE INVOLVING NATIVE HEART, ANGINA PRESENCE UNSPECIFIED, UNSPECIFIED VESSEL OR LESION TYPE: ICD-10-CM

## 2021-04-19 PROBLEM — R94.39 POSITIVE CARDIAC STRESS TEST: Status: ACTIVE | Noted: 2021-04-19

## 2021-04-19 LAB
ABO/RH: NORMAL
ABO/RH: NORMAL
ANTIBODY SCREEN: NORMAL
EKG ATRIAL RATE: 62 BPM
EKG P AXIS: 19 DEGREES
EKG P-R INTERVAL: 156 MS
EKG Q-T INTERVAL: 424 MS
EKG QRS DURATION: 92 MS
EKG QTC CALCULATION (BAZETT): 430 MS
EKG R AXIS: 19 DEGREES
EKG T AXIS: 55 DEGREES
EKG VENTRICULAR RATE: 62 BPM
POC ACT LR: 251 SECONDS

## 2021-04-19 PROCEDURE — 93005 ELECTROCARDIOGRAM TRACING: CPT | Performed by: INTERNAL MEDICINE

## 2021-04-19 PROCEDURE — B41F1ZZ FLUOROSCOPY OF RIGHT LOWER EXTREMITY ARTERIES USING LOW OSMOLAR CONTRAST: ICD-10-PCS | Performed by: SURGERY

## 2021-04-19 PROCEDURE — 6370000000 HC RX 637 (ALT 250 FOR IP): Performed by: NEUROMUSCULOSKELETAL MEDICINE & OMM

## 2021-04-19 PROCEDURE — C1874 STENT, COATED/COV W/DEL SYS: HCPCS

## 2021-04-19 PROCEDURE — 75710 ARTERY X-RAYS ARM/LEG: CPT | Performed by: SURGERY

## 2021-04-19 PROCEDURE — 2140000000 HC CCU INTERMEDIATE R&B

## 2021-04-19 PROCEDURE — 2500000003 HC RX 250 WO HCPCS

## 2021-04-19 PROCEDURE — 36415 COLL VENOUS BLD VENIPUNCTURE: CPT

## 2021-04-19 PROCEDURE — 93010 ELECTROCARDIOGRAM REPORT: CPT | Performed by: INTERNAL MEDICINE

## 2021-04-19 PROCEDURE — 86901 BLOOD TYPING SEROLOGIC RH(D): CPT

## 2021-04-19 PROCEDURE — 027034Z DILATION OF CORONARY ARTERY, ONE ARTERY WITH DRUG-ELUTING INTRALUMINAL DEVICE, PERCUTANEOUS APPROACH: ICD-10-PCS | Performed by: INTERNAL MEDICINE

## 2021-04-19 PROCEDURE — 2580000003 HC RX 258: Performed by: INTERNAL MEDICINE

## 2021-04-19 PROCEDURE — C1760 CLOSURE DEV, VASC: HCPCS

## 2021-04-19 PROCEDURE — 86900 BLOOD TYPING SEROLOGIC ABO: CPT

## 2021-04-19 PROCEDURE — 6370000000 HC RX 637 (ALT 250 FOR IP): Performed by: INTERNAL MEDICINE

## 2021-04-19 PROCEDURE — 92928 PRQ TCAT PLMT NTRAC ST 1 LES: CPT

## 2021-04-19 PROCEDURE — 4A023N7 MEASUREMENT OF CARDIAC SAMPLING AND PRESSURE, LEFT HEART, PERCUTANEOUS APPROACH: ICD-10-PCS | Performed by: INTERNAL MEDICINE

## 2021-04-19 PROCEDURE — 92928 PRQ TCAT PLMT NTRAC ST 1 LES: CPT | Performed by: INTERNAL MEDICINE

## 2021-04-19 PROCEDURE — 86850 RBC ANTIBODY SCREEN: CPT

## 2021-04-19 PROCEDURE — C1769 GUIDE WIRE: HCPCS

## 2021-04-19 PROCEDURE — 6370000000 HC RX 637 (ALT 250 FOR IP)

## 2021-04-19 PROCEDURE — 2709999900 HC NON-CHARGEABLE SUPPLY

## 2021-04-19 PROCEDURE — 36140 INTRO NDL ICATH UPR/LXTR ART: CPT | Performed by: SURGERY

## 2021-04-19 PROCEDURE — 85347 COAGULATION TIME ACTIVATED: CPT

## 2021-04-19 PROCEDURE — C1887 CATHETER, GUIDING: HCPCS

## 2021-04-19 PROCEDURE — C1725 CATH, TRANSLUMIN NON-LASER: HCPCS

## 2021-04-19 PROCEDURE — B2111ZZ FLUOROSCOPY OF MULTIPLE CORONARY ARTERIES USING LOW OSMOLAR CONTRAST: ICD-10-PCS | Performed by: INTERNAL MEDICINE

## 2021-04-19 PROCEDURE — 6360000002 HC RX W HCPCS

## 2021-04-19 PROCEDURE — C1894 INTRO/SHEATH, NON-LASER: HCPCS

## 2021-04-19 RX ORDER — ROSUVASTATIN CALCIUM 20 MG/1
20 TABLET, COATED ORAL NIGHTLY
Status: DISCONTINUED | OUTPATIENT
Start: 2021-04-19 | End: 2021-04-20 | Stop reason: HOSPADM

## 2021-04-19 RX ORDER — SODIUM CHLORIDE 9 MG/ML
25 INJECTION, SOLUTION INTRAVENOUS PRN
Status: DISCONTINUED | OUTPATIENT
Start: 2021-04-19 | End: 2021-04-20 | Stop reason: HOSPADM

## 2021-04-19 RX ORDER — ASPIRIN 81 MG/1
81 TABLET ORAL DAILY
Status: DISCONTINUED | OUTPATIENT
Start: 2021-04-20 | End: 2021-04-20 | Stop reason: HOSPADM

## 2021-04-19 RX ORDER — SODIUM CHLORIDE 9 MG/ML
INJECTION, SOLUTION INTRAVENOUS CONTINUOUS
Status: ACTIVE | OUTPATIENT
Start: 2021-04-19 | End: 2021-04-19

## 2021-04-19 RX ORDER — SODIUM CHLORIDE 0.9 % (FLUSH) 0.9 %
5-40 SYRINGE (ML) INJECTION PRN
Status: DISCONTINUED | OUTPATIENT
Start: 2021-04-19 | End: 2021-04-20 | Stop reason: HOSPADM

## 2021-04-19 RX ORDER — LEVOTHYROXINE SODIUM 0.1 MG/1
100 TABLET ORAL DAILY
Status: DISCONTINUED | OUTPATIENT
Start: 2021-04-19 | End: 2021-04-20 | Stop reason: HOSPADM

## 2021-04-19 RX ORDER — M-VIT,TX,IRON,MINS/CALC/FOLIC 27MG-0.4MG
1 TABLET ORAL DAILY
Status: DISCONTINUED | OUTPATIENT
Start: 2021-04-19 | End: 2021-04-20 | Stop reason: HOSPADM

## 2021-04-19 RX ORDER — SODIUM CHLORIDE 9 MG/ML
INJECTION, SOLUTION INTRAVENOUS ONCE
Status: COMPLETED | OUTPATIENT
Start: 2021-04-19 | End: 2021-04-19

## 2021-04-19 RX ORDER — CARVEDILOL 3.12 MG/1
3.12 TABLET ORAL 2 TIMES DAILY WITH MEALS
Status: DISCONTINUED | OUTPATIENT
Start: 2021-04-19 | End: 2021-04-20 | Stop reason: HOSPADM

## 2021-04-19 RX ORDER — ASCORBIC ACID 500 MG
500 TABLET ORAL DAILY
Status: DISCONTINUED | OUTPATIENT
Start: 2021-04-19 | End: 2021-04-20 | Stop reason: HOSPADM

## 2021-04-19 RX ORDER — ISOSORBIDE MONONITRATE 30 MG/1
30 TABLET, EXTENDED RELEASE ORAL DAILY
Status: DISCONTINUED | OUTPATIENT
Start: 2021-04-19 | End: 2021-04-20 | Stop reason: HOSPADM

## 2021-04-19 RX ORDER — PANTOPRAZOLE SODIUM 40 MG/1
40 TABLET, DELAYED RELEASE ORAL DAILY
Status: DISCONTINUED | OUTPATIENT
Start: 2021-04-19 | End: 2021-04-20 | Stop reason: HOSPADM

## 2021-04-19 RX ORDER — SUCRALFATE 1 G/1
1 TABLET ORAL 2 TIMES DAILY
Status: DISCONTINUED | OUTPATIENT
Start: 2021-04-19 | End: 2021-04-20 | Stop reason: HOSPADM

## 2021-04-19 RX ORDER — SODIUM CHLORIDE 0.9 % (FLUSH) 0.9 %
5-40 SYRINGE (ML) INJECTION EVERY 12 HOURS SCHEDULED
Status: DISCONTINUED | OUTPATIENT
Start: 2021-04-19 | End: 2021-04-20 | Stop reason: HOSPADM

## 2021-04-19 RX ORDER — FERROUS SULFATE 325(65) MG
325 TABLET ORAL
Status: DISCONTINUED | OUTPATIENT
Start: 2021-04-20 | End: 2021-04-20 | Stop reason: HOSPADM

## 2021-04-19 RX ORDER — MESALAMINE 400 MG/1
400 CAPSULE, DELAYED RELEASE ORAL 3 TIMES DAILY
Status: DISCONTINUED | OUTPATIENT
Start: 2021-04-19 | End: 2021-04-20 | Stop reason: HOSPADM

## 2021-04-19 RX ORDER — ACETAMINOPHEN 325 MG/1
650 TABLET ORAL EVERY 4 HOURS PRN
Status: DISCONTINUED | OUTPATIENT
Start: 2021-04-19 | End: 2021-04-20 | Stop reason: HOSPADM

## 2021-04-19 RX ADMIN — OXYCODONE HYDROCHLORIDE AND ACETAMINOPHEN 500 MG: 500 TABLET ORAL at 18:08

## 2021-04-19 RX ADMIN — CARVEDILOL 3.12 MG: 3.12 TABLET, FILM COATED ORAL at 18:08

## 2021-04-19 RX ADMIN — PANTOPRAZOLE SODIUM 40 MG: 40 TABLET, DELAYED RELEASE ORAL at 18:08

## 2021-04-19 RX ADMIN — SODIUM CHLORIDE: 9 INJECTION, SOLUTION INTRAVENOUS at 08:14

## 2021-04-19 RX ADMIN — ACETAMINOPHEN 650 MG: 325 TABLET ORAL at 14:14

## 2021-04-19 RX ADMIN — SERTRALINE 50 MG: 50 TABLET, FILM COATED ORAL at 20:20

## 2021-04-19 RX ADMIN — ISOSORBIDE MONONITRATE 30 MG: 30 TABLET ORAL at 18:08

## 2021-04-19 RX ADMIN — Medication 10 ML: at 20:21

## 2021-04-19 RX ADMIN — SUCRALFATE 1 G: 1 TABLET ORAL at 18:08

## 2021-04-19 RX ADMIN — ROSUVASTATIN 20 MG: 20 TABLET, FILM COATED ORAL at 20:20

## 2021-04-19 RX ADMIN — SODIUM CHLORIDE: 9 INJECTION, SOLUTION INTRAVENOUS at 11:00

## 2021-04-19 RX ADMIN — MESALAMINE 400 MG: 400 CAPSULE, DELAYED RELEASE ORAL at 20:20

## 2021-04-19 RX ADMIN — Medication 1 TABLET: at 18:08

## 2021-04-19 RX ADMIN — TICAGRELOR 90 MG: 90 TABLET ORAL at 20:20

## 2021-04-19 ASSESSMENT — PAIN SCALES - GENERAL
PAINLEVEL_OUTOF10: 0
PAINLEVEL_OUTOF10: 0

## 2021-04-19 ASSESSMENT — PAIN DESCRIPTION - DESCRIPTORS: DESCRIPTORS: ACHING;DISCOMFORT;SORE

## 2021-04-19 ASSESSMENT — PAIN DESCRIPTION - ORIENTATION: ORIENTATION: RIGHT

## 2021-04-19 ASSESSMENT — PAIN DESCRIPTION - PAIN TYPE: TYPE: CHRONIC PAIN

## 2021-04-19 NOTE — OP NOTE
Operative Note      Patient: Ave Cornell  YOB: 1950  MRN: [id number]    Date of Procedure: Difficult arterial access    Pre-Op Diagnosis: Widely patent right common femoral artery. Widely patent proximal superficial femoral and profundofemoral    Post-Op Diagnosis: Same    Findings: Ultrasound demonstrated a widely patent common femoral vessel. Bifurcation was easily seen. Minimal plaquing was noted. Angiogram demonstrated widely patent common femoral vessel and good placement of the micropuncture sheath. The common femoral profundofemoral and proximal superficial femoral normal are widely patent    Surgeon: Marilia Andrea MD    Assistant: None    Estimated blood loss for my portion of the procedure less than 10 cc    Procedure:  #1 ultrasound-guided right arterial access. #2 right lower extremity arteriogram encompassing the common femoral profunda femoris distal external iliac vessel and proximal superficial femoral vessel. #3 percutaneous closure of the right common femoral vessel after cardiac catheterization. Description of the procedure: Risk benefits and alternatives with artery discussed by cardiology. They were concerned about arterial access. We then scrubbed in. Ultrasound was used identify the right common femoral vessel. The bifurcation was easily seen. Minimal plaquing was noted. Lidocaine was used to infiltrate the skin and subcutaneous tissue. We are then able to puncture this with a micropuncture needle micropuncture wire and sheath. The sheath was then hooked up to tubing followed by injecting through the sheath contrast in an BRANCH position. Sequential imaging demonstrated that the sheath was in good position in the common femoral the external iliac common femoral profunda and proximal superficial femoral vessel were unremarkable. At this point a J-wire was placed the abdominal aorta. This is upsized to a standard 6 [place] sheath. See cardiology dictation. Once they were complete we can were called back into the room for percutaneous closure of the right common femoral vessel. A Pro-glide was able to deployed without any difficulty. Over the wire. Good wall apposition was noted. The wire was removed Pro-glide was secured cut and pressure held. At the end the procedure over-the-counter for the patient tolerated procedure well.     Electronically signed by Saud Richards MD on 4/19/2021 at 9:47 AM

## 2021-04-19 NOTE — PROCEDURES
[address]                  [address] [place],  [facility]                            CARDIAC CATHETERIZATION    PATIENT NAME: Sarah Dill                 :        1950  MED REC NO:   [id number]                            ROOM:       4149  ACCOUNT NO:   [de-identified]                           ADMIT DATE: 2021  PROVIDER:     Kiko Arthur MD    DATE OF PROCEDURE:  2021    STAGED PCI    INDICATION:  Severe proximal to mid RCA stenosis and positive nuclear  stress test.    PROCEDURE NOTE:  The patient was brought to the cardiac cath lab in her  usual fasting state. Under sterile condition and under local anesthetic  and using conscious sedation, I failed to access the left common femoral  artery. Then, Dr. Lyndon Burton was asked to obtain access and under  ultrasound guidance and using micropuncture, a 6-[place]an sheath was  inserted into the right common femoral artery. Peripheral angiogram  done revealed patent left common femoral artery. Then, a 6-[place]an JR4  guide catheter was advanced to the ascending aorta without difficulty. The right coronary artery was engaged and a coronary angiogram was done. Angiogram done revealed 80% tubular proximal to mid RCA stenosis with  KAROLINA-3 flow distally to a PDA and a PLV branch. The patient was given a  bolus of heparin of 6000 units. Then, a BMW wire was advanced to the  PDA without difficulty. Then, a 2.5 x 15 Ragland Monorail balloon was  inflated once at 12 atmospheres. Then, a 3.5 x 22 Resolute Fowler stent  was deployed at 14 atmospheres at the mid RCA with 0% residual stenosis  and KAROLINA-3 flow distally. The patient received two tablets of crushed  Brilinta. Her ACT was 251. She received an extra 2000 units of  intravenous heparin. Then, a 6-C[place] Ver[place]an Perclose was used by Dr. Lyndon Burton to close the arteriotomy site.   The patient tolerated the  procedure very well and no complications were noted. No significant  blood loss occurred during the procedure. IMPRESSION:  Successful staged PCI of the severe prox to mid RCA  stenosis using a drug-eluting stent. RECOMMENDATIONS:  1. DAPT. 2.  Aggressive medical therapy. 3.  Cardiac rehab post hospital discharge. PROCEDURE START TIME:  08:39 a.m. PROCEDURE END TIME:  09:30 a.m. CONTRAST VOLUME:  30 mL of Optiray. FLUOROSCOPY TIME:  3.7 minutes. CONSCIOUS SEDATION:  3 mg of intravenous Versed and 150 mcg of  intravenous fentanyl.         Marci Rodriguez MD    D: 04/19/2021 9:51:23       T: 04/19/2021 9:54:46     [email or web address]  Job#: [id number]     Doc#: [id number]    CC:

## 2021-04-19 NOTE — PLAN OF CARE
Problem: Falls - Risk of:  Goal: Will remain free from falls  Description: Will remain free from falls  Outcome: Met This Shift  Goal: Absence of physical injury  Description: Absence of physical injury  Outcome: Met This Shift     Problem: Discharge Planning:  Goal: Ability to perform activities of daily living will improve  Description: Ability to perform activities of daily living will improve  Outcome: Met This Shift  Goal: Able to tolerate medications  Description: Able to tolerate medications  Outcome: Met This Shift  Goal: Ability to achieve adequate nutritional intake will improve  Description: Ability to achieve adequate nutritional intake will improve  Outcome: Met This Shift     Problem:  Activity Intolerance:  Goal: Able to perform prescribed physical activity  Description: Able to perform prescribed physical activity  Outcome: Met This Shift  Goal: Oxygen saturation during activity within specified parameters  Description: Oxygen saturation during activity within specified parameters  Outcome: Met This Shift  Goal: Ability to maintain vital signs within normal range will improve to within specified parameters  Description: Ability to maintain vital signs within normal range will improve to within specified parameters  Outcome: Met This Shift     Problem: Cardiac Output - Decreased:  Goal: Absence of orthostatic hypotension  Description: Absence of orthostatic hypotension  Outcome: Met This Shift  Goal: Absence of orthostatic pulse rate changes  Description: Absence of orthostatic pulse rate changes  Outcome: Met This Shift  Goal: Cardiac output within specified parameters  Description: Cardiac output within specified parameters  Outcome: Met This Shift  Goal: Circulatory function of lower extremities is within specified parameters  Description: Circulatory function of lower extremities is within specified parameters  Outcome: Met This Shift     Problem: Fluid Volume - Imbalance:  Goal: Absence of peripheral edema  Description: Absence of peripheral edema  Outcome: Met This Shift  Goal: Achieves intake and output within specified parameters  Description: Achieves intake and output within specified parameters  Outcome: Met This Shift  Goal: Electrolytes within specified parameters  Description: Electrolytes within specified parameters  Outcome: Met This Shift  Goal: Ability to maintain vital signs within normal range will improve to within specified parameters  Description: Ability to maintain vital signs within normal range will improve to within specified parameters  Outcome: Met This Shift     Problem: Infection - Surgical Site:  Goal: Will show no infection signs and symptoms  Description: Will show no infection signs and symptoms  Outcome: Met This Shift  Goal: Ability to maintain a body temperature in the normal range will improve to within specified parameters  Description: Ability to maintain a body temperature in the normal range will improve to within specified parameters  Outcome: Met This Shift  Goal: Ability to maintain appropriate glucose levels will improve to within specified parameters  Description: Ability to maintain appropriate glucose levels will improve to within specified parameters  Outcome: Met This Shift     Problem: Pain - Acute:  Goal: Recognizes and communicates pain  Description: Recognizes and communicates pain  Outcome: Met This Shift  Goal: Pain level will decrease  Description: Pain level will decrease  Outcome: Met This Shift     Problem: Tissue Perfusion - Cardiopulmonary, Altered:  Goal: Absence of angina  Description: Absence of angina  Outcome: Met This Shift  Goal: Ability to maintain arterial blood gas levels within normal range will improve to within specified parameters  Description: Ability to maintain arterial blood gas levels within normal range will improve to within specified parameters  Outcome: Met This Shift  Goal: Ability to maintain normal pulse oximetry readings will improve to within specified parameters  Description: Ability to maintain normal pulse oximetry readings will improve to within specified parameters  Outcome: Met This Shift  Goal: Ability to maintain vital signs within normal range will improve to within specified parameters  Description: Ability to maintain vital signs within normal range will improve to within specified parameters  Outcome: Met This Shift     Problem: Tissue Perfusion - Peripheral, Altered:  Goal: Absence of hematoma at arterial access site  Description: Absence of hematoma at arterial access site  Outcome: Met This Shift  Goal: Absence of signs or symptoms of impaired coagulation  Description: Absence of signs or symptoms of impaired coagulation  Outcome: Met This Shift  Goal: Circulatory function of lower extremities is within specified parameters  Description: Circulatory function of lower extremities is within specified parameters  Outcome: Met This Shift  Goal: Strength of peripheral pulses will increase  Description: Strength of peripheral pulses will increase  Outcome: Met This Shift     Problem: Tissue Perfusion - Renal, Altered:  Goal: Urine creatinine clearance will be within specified parameters  Description: Urine creatinine clearance will be within specified parameters  Outcome: Met This Shift  Goal: Serum creatinine will be within specified parameters  Description: Serum creatinine will be within specified parameters  Outcome: Met This Shift  Goal: Achieves intake and output within specified parameters  Description: Achieves intake and output within specified parameters  Outcome: Met This Shift     Problem: Tobacco Use:  Goal: Will participate in inpatient tobacco-use cessation counseling  Description: Will participate in inpatient tobacco-use cessation counseling  Outcome: Met This Shift  Goal: Tobacco-use cessation readiness  Description: Tobacco-use cessation readiness  Outcome: Met This Shift

## 2021-04-20 VITALS
OXYGEN SATURATION: 98 % | DIASTOLIC BLOOD PRESSURE: 61 MMHG | BODY MASS INDEX: 38.3 KG/M2 | SYSTOLIC BLOOD PRESSURE: 113 MMHG | HEIGHT: 59 IN | WEIGHT: 190 LBS | TEMPERATURE: 97.6 F | RESPIRATION RATE: 16 BRPM | HEART RATE: 80 BPM

## 2021-04-20 LAB
ANION GAP SERPL CALCULATED.3IONS-SCNC: 11 MMOL/L (ref 7–16)
BUN BLDV-MCNC: 12 MG/DL (ref 8–23)
CALCIUM SERPL-MCNC: 8.7 MG/DL (ref 8.6–10.2)
CHLORIDE BLD-SCNC: 105 MMOL/L (ref 98–107)
CO2: 25 MMOL/L (ref 22–29)
CREAT SERPL-MCNC: 0.6 MG/DL (ref 0.5–1)
GFR AFRICAN AMERICAN: >60
GFR NON-AFRICAN AMERICAN: >60 ML/MIN/1.73
GLUCOSE BLD-MCNC: 114 MG/DL (ref 74–99)
HCT VFR BLD CALC: 28.7 % (ref 34–48)
HEMOGLOBIN: 8.7 G/DL (ref 11.5–15.5)
MCH RBC QN AUTO: 26 PG (ref 26–35)
MCHC RBC AUTO-ENTMCNC: 30.3 % (ref 32–34.5)
MCV RBC AUTO: 85.9 FL (ref 80–99.9)
PDW BLD-RTO: 14.8 FL (ref 11.5–15)
PLATELET # BLD: 256 E9/L (ref 130–450)
PMV BLD AUTO: 10.9 FL (ref 7–12)
POTASSIUM SERPL-SCNC: 4.3 MMOL/L (ref 3.5–5)
RBC # BLD: 3.34 E12/L (ref 3.5–5.5)
SODIUM BLD-SCNC: 141 MMOL/L (ref 132–146)
WBC # BLD: 7.1 E9/L (ref 4.5–11.5)

## 2021-04-20 PROCEDURE — 99232 SBSQ HOSP IP/OBS MODERATE 35: CPT | Performed by: INTERNAL MEDICINE

## 2021-04-20 PROCEDURE — 36415 COLL VENOUS BLD VENIPUNCTURE: CPT

## 2021-04-20 PROCEDURE — 6370000000 HC RX 637 (ALT 250 FOR IP): Performed by: INTERNAL MEDICINE

## 2021-04-20 PROCEDURE — 6370000000 HC RX 637 (ALT 250 FOR IP): Performed by: NEUROMUSCULOSKELETAL MEDICINE & OMM

## 2021-04-20 PROCEDURE — APPSS30 APP SPLIT SHARED TIME 16-30 MINUTES: Performed by: PHYSICIAN ASSISTANT

## 2021-04-20 PROCEDURE — 85027 COMPLETE CBC AUTOMATED: CPT

## 2021-04-20 PROCEDURE — 2580000003 HC RX 258: Performed by: INTERNAL MEDICINE

## 2021-04-20 PROCEDURE — 80048 BASIC METABOLIC PNL TOTAL CA: CPT

## 2021-04-20 RX ORDER — ASPIRIN 81 MG/1
81 TABLET ORAL DAILY
Qty: 30 TABLET | Refills: 3 | Status: SHIPPED | OUTPATIENT
Start: 2021-04-21

## 2021-04-20 RX ADMIN — LEVOTHYROXINE SODIUM 100 MCG: 0.1 TABLET ORAL at 06:18

## 2021-04-20 RX ADMIN — MESALAMINE 400 MG: 400 CAPSULE, DELAYED RELEASE ORAL at 15:29

## 2021-04-20 RX ADMIN — ISOSORBIDE MONONITRATE 30 MG: 30 TABLET ORAL at 10:08

## 2021-04-20 RX ADMIN — CARVEDILOL 3.12 MG: 3.12 TABLET, FILM COATED ORAL at 18:42

## 2021-04-20 RX ADMIN — ASPIRIN 81 MG: 81 TABLET, COATED ORAL at 10:07

## 2021-04-20 RX ADMIN — SUCRALFATE 1 G: 1 TABLET ORAL at 10:08

## 2021-04-20 RX ADMIN — FERROUS SULFATE TAB 325 MG (65 MG ELEMENTAL FE) 325 MG: 325 (65 FE) TAB at 10:17

## 2021-04-20 RX ADMIN — Medication 1 TABLET: at 10:09

## 2021-04-20 RX ADMIN — CARVEDILOL 3.12 MG: 3.12 TABLET, FILM COATED ORAL at 10:07

## 2021-04-20 RX ADMIN — OXYCODONE HYDROCHLORIDE AND ACETAMINOPHEN 500 MG: 500 TABLET ORAL at 10:08

## 2021-04-20 RX ADMIN — SUCRALFATE 1 G: 1 TABLET ORAL at 18:42

## 2021-04-20 RX ADMIN — PANTOPRAZOLE SODIUM 40 MG: 40 TABLET, DELAYED RELEASE ORAL at 10:08

## 2021-04-20 RX ADMIN — Medication 10 ML: at 10:09

## 2021-04-20 RX ADMIN — TICAGRELOR 90 MG: 90 TABLET ORAL at 10:07

## 2021-04-20 RX ADMIN — MESALAMINE 400 MG: 400 CAPSULE, DELAYED RELEASE ORAL at 10:08

## 2021-04-20 ASSESSMENT — PAIN SCALES - GENERAL
PAINLEVEL_OUTOF10: 0
PAINLEVEL_OUTOF10: 0

## 2021-04-20 NOTE — PROGRESS NOTES
Inpatient Cardiology Progress note     PATIENT IS BEING FOLLOWED FOR:CAD and RCA stenting on 4/19/21    Leann Conn is a 72 y.o. female ACD, s/p RCA stenting on 4/19/21,  ischemic cardiomyopathy with an EF 45-50%. SUBJECTIVE: No complains at the moment of exam. Has had an episode of SOB yesterday after brilinta. OBJECTIVE: No apparent distress     ROS:  Consist: Denies fevers, chills or night sweats  Heart: Denies chest pain, palpitations, lightheadedness, dizziness or syncope  Lungs: Denies SOB, cough, wheezing, orthopnea or PND  GI: Denies abdominal pain, vomiting or diarrhea    PHYSICAL EXAM:   /64   Pulse 68   Temp 98.1 °F (36.7 °C) (Oral)   Resp 16   Ht 4' 11\" (1.499 m)   Wt 190 lb (86.2 kg)   SpO2 98%   BMI 38.38 kg/m²    B/P Range last 24 hours: Systolic (69NCX), IGX:732 , Min:126 , AYD:300    Diastolic (82XYI), AVC:60, Min:58, Max:65    CONST: Obese. Awake, alert and cooperative. No apparent distress  HEENT:   Head- Normocephalic, atraumatic   Eyes- Conjunctivae pink, anicteric  Throat- Oral mucosa pink and moist  Neck-  No stridor, trachea midline, no jugular venous distention. No carotid bruit  CHEST: Chest symmetrical and non-tender to palpation. No accessory muscle use or intercostal retractions  RESPIRATORY:  Lung sounds - singular basilar crackles bilateral     CARDIOVASCULAR:     Heart Inspection- shows no noted pulsations  Heart Palpation- no heaves or thrills; PMI is non-displaced   Heart Ausculation- Regular rate and rhythm, no murmur. No s3, s4 or rub   PV: No lower extremity edema. No varicosities. Pedal pulses palpable: DP pulses +1 bilateral, no clubbing or cyanosis   ABDOMEN: Obese. Soft, non-tender to light palpation. Bowel sounds present. MS: Good muscle strength and tone. No atrophy or abnormal movements. : Deferred  SKIN: Warm and dry no statis dermatitis or ulcers   NEURO / PSYCH: Oriented to person, place and time. Speech clear and appropriate.  Follows

## 2021-04-20 NOTE — PLAN OF CARE
Problem: Falls - Risk of:  Goal: Will remain free from falls  Description: Will remain free from falls  4/20/2021 0116 by Murphy Escalante RN  Outcome: Met This Shift  4/19/2021 1223 by Shruthi Boone RN  Outcome: Met This Shift

## 2021-04-20 NOTE — PATIENT CARE CONFERENCE
P Quality Flow/Interdisciplinary Rounds Progress Note        Quality Flow Rounds held on April 20, 2021    Disciplines Attending:  Bedside Nurse, ,  and Nursing Unit Leadership    Dulce Egan was admitted on 4/19/2021  9:48 AM    Anticipated Discharge Date:  Expected Discharge Date: 04/21/21    Disposition:    Lm Score:  Lm Scale Score: 23    Readmission Risk              Risk of Unplanned Readmission:        13           Discussed patient goal for the day, patient clinical progression, and barriers to discharge.   The following Goal(s) of the Day/Commitment(s) have been identified:  discharge planning      Ezra Cantu  April 20, 2021

## 2021-04-20 NOTE — CARE COORDINATION
4/20, SW met with patient at bedside to discuss transition of care/discharge plan and SW role. Discharge plan is home once medically cleared for discharge. Patient lives alone in a 1 story home with 3 steps to enter the home. DME owned is a wheelchair which was 's. Patient has a walk in shower with a seat located in the shower. PCP is Dr. Kate Bright and Pharmacy is [facility] in [place]. No C hx.  Patient is independent with ADL's. Daughter-Bianca will be transportation for patient. Son and daughter in law live next door and helps patient out as needed. No needs identified at this time. SW to follow for further discharge planning needs.     BYRON Augustin  [facility] Case Management  [phone]

## 2021-04-21 ENCOUNTER — CARE COORDINATION (OUTPATIENT)
Dept: CASE MANAGEMENT | Age: 71
End: 2021-04-21

## 2021-04-21 ENCOUNTER — TELEPHONE (OUTPATIENT)
Dept: CARDIOLOGY CLINIC | Age: 71
End: 2021-04-21

## 2021-04-21 NOTE — CARE COORDINATION
[facility] Transitions Initial Follow Up Call    Call within 2 business days of discharge: Yes    Patient: Elizabeth Fulton Patient : 1950   MRN: [id number]  Reason for Admission: CAD in native artery, stent placement  Discharge Date: 21 RARS: Readmission Risk Score: 13      Last Discharge [address] [address]       Complaint Diagnosis Description Type Department Provider    21  Coronary artery disease involving native heart, angina presence unspecified, unspecified vessel or lesion type . .. Admission (Discharged) from [facility]             Facility: [facility] to contact patient for initial transitions call. Contact information left to  requesting call back at the earliest convenience.     Follow Up  Future Appointments   Date Time Provider Yosi Duran   2021  9:45 AM DO Davdi Juarez RN

## 2021-04-21 NOTE — DISCHARGE SUMMARY
headache,  or blurred vision. Discharge Medications:    See medication reconciliation under discharge insructions. Consults:  cardiology    Significant Diagnostic Studies:  nuclear medicine:   Quin Villavicencio MD   Physician   Cardiology   Procedures   Signed   Date of Service:  2021  9:54 AM            Procedure Orders   Cardiac Catheterization [[id number]] ordered by Quin Villavicencio MD at 21 0756   Diagnostic cardiac cath lab procedure [[id number]] ordered by  at 21 0850          Signed                         [address] [place] [address]  [facility]                             CARDIAC CATHETERIZATION     PATIENT NAME: Bhupinder Price                 :        1950  MED REC NO:   [id number]                            ROOM:       63  ACCOUNT NO:   [de-identified]                           ADMIT DATE: 2021  PROVIDER:     Graciela Fallon MD     DATE OF PROCEDURE:  2021     STAGED PCI     INDICATION:  Severe proximal to mid RCA stenosis and positive nuclear  stress test.     PROCEDURE NOTE:  The patient was brought to the cardiac cath lab in her  usual fasting state. Under sterile condition and under local anesthetic  and using conscious sedation, I failed to access the left common femoral  artery. Then, Dr. Reginald Flores was asked to obtain access and under  ultrasound guidance and using micropuncture, a 6-German sheath was  inserted into the right common femoral artery. Peripheral angiogram  done revealed patent left common femoral artery. Then, a 6-German JR4  guide catheter was advanced to the ascending aorta without difficulty. The right coronary artery was engaged and a coronary angiogram was done. Angiogram done revealed 80% tubular proximal to mid RCA stenosis with  KAROLINA-3 flow distally to a PDA and a PLV branch. The patient was given a  bolus of heparin of 6000 units.   Then, a BMW wire was advanced to the  PDA without difficulty. Then, a 2.5 x 15 Cavendish Monorail balloon was  inflated once at 12 atmospheres. Then, a 3.5 x 22 Resolute Fletcher stent  was deployed at 14 atmospheres at the mid RCA with 0% residual stenosis  and KAROLINA-3 flow distally. The patient received two tablets of crushed  Brilinta. Her ACT was 251. She received an extra 2000 units of  intravenous heparin. Then, a 6-Italian Perclose was used by Dr. Arnulfo Mays to close the arteriotomy site. The patient tolerated the  procedure very well and no complications were noted. No significant  blood loss occurred during the procedure.     IMPRESSION:  Successful staged PCI of the severe prox to mid RCA  stenosis using a drug-eluting stent.     RECOMMENDATIONS:  1. DAPT. 2.  Aggressive medical therapy. 3.  Cardiac rehab post hospital discharge.     PROCEDURE START TIME:  08:39 a.m.     PROCEDURE END TIME:  09:30 a.m.     CONTRAST VOLUME:  30 mL of Optiray.     FLUOROSCOPY TIME:  3.7 minutes.     CONSCIOUS SEDATION:  3 mg of intravenous Versed and 150 mcg of  intravenous fentanyl.           Prince Jackson MD     D: 04/19/2021 9:51:23       T: 04/19/2021 9:54:46     [id number]  Job#: [id number]     Doc#: [id number]     CC:               Last signed by: France Lo MD at 4/19/2021  8:25 PM   Routing History                        Treatments:   Staged PCI at the severe proximal to mid RCA stenosis using DALIA.     Discharge Exam:  /61   Pulse 80   Temp 97.6 °F (36.4 °C) (Tympanic)   Resp 16   Ht 4' 11\" (1.499 m)   Wt 190 lb (86.2 kg)   SpO2 98%   BMI 38.38 kg/m²     General Appearance:    Alert, cooperative, no distress, appears stated age   Head:    Normocephalic, without obvious abnormality, atraumatic   Eyes:    PERRL, conjunctiva/corneas clear, EOM's intact, fundi     benign, both eyes   Ears:    Normal TM's and external ear canals, both ears   Nose:   Nares normal, septum midline, mucosa normal, no drainage    or sinus tenderness   Throat:   Lips, mucosa, and tongue normal; teeth and gums normal   Neck:   Supple, symmetrical, trachea midline, no adenopathy;     thyroid:  no enlargement/tenderness/nodules; no carotid    bruit or JVD   Back:     Symmetric, no curvature, ROM normal, no CVA tenderness   Lungs:     Clear to auscultation bilaterally, respirations unlabored   Chest Wall:    No tenderness or deformity    Heart:    Regular rate and rhythm, S1 and S2 normal, no murmur, rub   or gallop   Breast Exam:    No tenderness, masses, or nipple abnormality   Abdomen:     Soft, non-tender, bowel sounds active all four quadrants,     no masses, no organomegaly   Genitalia:    Normal female without lesion, discharge or tenderness   Rectal:    Normal tone ;guaiac negative stool   Extremities:   Extremities normal, atraumatic, no cyanosis or edema   Pulses:   2+ and symmetric all extremities   Skin:   Skin color, texture, turgor normal, no rashes or lesions   Lymph nodes:   Cervical, supraclavicular, and axillary nodes normal   Neurologic:   CNII-XII intact, normal strength, sensation and reflexes     throughout       Disposition:   home       Medication List      START taking these medications    aspirin 81 MG EC tablet  Take 1 tablet by mouth daily     ticagrelor 90 MG Tabs tablet  Commonly known as: BRILINTA  Take 1 tablet by mouth 2 times daily        CONTINUE taking these medications    ascorbic acid 500 MG tablet  Commonly known as: VITAMIN C     carvedilol 3.125 MG tablet  Commonly known as: COREG  Take 1 tablet by mouth 2 times daily (with meals)     Centrum Tabs     ferrous sulfate 325 (65 Fe) MG tablet  Commonly known as: IRON 325  Take 1 tablet by mouth daily (with breakfast)     fluticasone 50 MCG/ACT nasal spray  Commonly known as: FLONASE     isosorbide mononitrate 30 MG extended release tablet  Commonly known as: IMDUR  Take 1 tablet by mouth daily     levothyroxine 100 MCG tablet  Commonly known as: Synthroid  Take 1 tablet by mouth Daily     mesalamine 1.2 g EC tablet  Commonly known as: LIALDA     nitroGLYCERIN 0.4 MG SL tablet  Commonly known as: Nitrostat  Place 1 tablet under the tongue every 5 minutes as needed for Chest pain     omeprazole 20 MG delayed release capsule  Commonly known as: PRILOSEC     rosuvastatin 20 MG tablet  Commonly known as: CRESTOR  Take 1 tablet by mouth nightly     sertraline 50 MG tablet  Commonly known as: ZOLOFT  Take 1 tablet by mouth nightly     sucralfate 1 GM tablet  Commonly known as: CARAFATE  Take 1 tablet by mouth 2 times daily        STOP taking these medications    Bevespi Aerosphere 9-4.8 MCG/ACT Aero  Generic drug: glycopyrrolate-formoterol           Where to Get Your Medications      These medications were sent to [address], [place] - [address] [place] [address] [phone] [facility] [phone]  [address] [place] [address]    Phone: [phone]   · aspirin 81 MG EC tablet  · ticagrelor 90 MG Tabs tablet              Signed:  Piyush Lai D.O.  4/21/2021, 7:27 AM

## 2021-04-22 ENCOUNTER — CARE COORDINATION (OUTPATIENT)
Dept: CASE MANAGEMENT | Age: 71
End: 2021-04-22

## 2021-04-22 NOTE — CARE COORDINATION
Challenges to be reviewed by the provider   Additional needs identified to be addressed with provider No  none             Method of communication with provider : none    Discussed COVID-19 related testing which was available at this time. Test results were negative. Patient informed of results, if available? aware    Was this a readmission? No    Care Transition Nurse (CTN) contacted the patient by telephone to perform post hospital discharge assessment. Verified name and  with patient as identifiers. Provided introduction to self, and explanation of the CTN role. CTN reviewed discharge instructions, medical action plan and red flags with patient who verbalized understanding. Patient given an opportunity to ask questions and does not have any further questions or concerns at this time. Were discharge instructions available to patient? Yes. Reviewed appropriate site of care based on symptoms and resources available to patient including: PCP and When to call 911. The patient agrees to contact the PCP office for questions related to their healthcare. Medication reconciliation was performed with patient, who verbalizes understanding of administration of home medications. Advised obtaining a 90-day supply of all daily and as-needed medications. Covid Risk Education    Patient has following risk factors of: no known risk factors. Education provided regarding infection prevention, and signs and symptoms of COVID-19 and when to seek medical attention with patient who verbalized understanding. Discussed exposure protocols and quarantine From CDC: Are you at higher risk for severe illness?   and given an opportunity for questions and concerns. The patient agrees to contact the COVID-19 hotline [phone] or PCP office for questions related to COVID-19. For more information on steps you can take to protect yourself, see CDC's How to Protect Yourself   Discussed follow-up appointments.  If no appointment was previously scheduled, appointment scheduling offered: Yes. Is follow up appointment scheduled within 7 days of discharge? Yes agrees to schedule    Plan for follow-up call in 3-5 days based on severity of symptoms and risk factors. Plan for next call: symptom management-  and self management-    CTN provided contact information for future needs. States she's doing fine. Reports her SOB has improved a lot. States she still has random chest pains that quickly self-resolve. Discussed what to do if pt has sustained unresolved CP. Pt verbalized understanding and confirms she has nitro sublingual tablets. Discussed when to call the doctor or 911. Meds reviewed and pt reports she's taking as prescribed. States her groin site looks good - denies swelling, redness or drainage. Denies any new or worsening symptoms. States she's going to call her PCP after this call to schedule follow up appt. Denies questions or concerns at this time.

## 2021-04-23 NOTE — TELEPHONE ENCOUNTER
Jennie Blake called back. Eileen Hernandez will do the letter today. Jenine Blake will call back with fax number to send it to.

## 2021-04-26 ENCOUNTER — TELEPHONE (OUTPATIENT)
Dept: CARDIOLOGY CLINIC | Age: 71
End: 2021-04-26

## 2021-04-26 NOTE — TELEPHONE ENCOUNTER
DR Chase Dorsey CALLED C/O LIGHT FLUTTERING IN HER HEART. IT HAPPENS WITH NO EXERTION AND LASTS FOR A FEW MINUTES.     PLEASE ADVISE

## 2021-04-27 ENCOUNTER — CARE COORDINATION (OUTPATIENT)
Dept: CASE MANAGEMENT | Age: 71
End: 2021-04-27

## 2021-04-27 NOTE — CARE COORDINATION
[facility] Transitions Follow Up Call    2021    Patient: Mason Taylor  Patient : 1950   MRN: [id number]  Reason for Admission: CAD in native artery  Discharge Date: 21 RARS: Readmission Risk Score: 13         Spoke with: Mason Taylor, patient      Needs to be reviewed by the provider   Additional needs identified to be addressed with provider No  none           Method of communication with provider : none    Care Transition Nurse (CTN) contacted the patient by telephone to follow up after admission on 21. Verified name and  with patient as identifiers. Addressed changes since last contact: symptom management-Patient denies sob and cp, reports palpitations earlier; however, patient reports anxiety due to possibly losing job. Dulce reports she needs paperwork filled out by physician. This CTN encouraged patient to contact Dr. Lucille Agustin office regarding paperwork. Patient agreeable., follow up appointment-Patient reports appt scheduled with Dr. Melita García on 21 and Dr. Kel Duque on 21. and medication management-Denies medication changes. Referral to PAP made for Brilinta. Discharged needs reviewed: PAP  Follow up appointment completed? No    CTN reviewed discharge instructions, medical action plan and red flags with patient and discussed any barriers to care and/or understanding of plan of care after discharge. Discussed appropriate site of care based on symptoms and resources available to patient including: PCP, Specialist and When to call 911. The patient agrees to contact the PCP office for questions related to their healthcare. Patients top risk factors for readmission: financial, medical condition-cad and anxiety    CTN provided contact information for future needs.       Care Transitions Subsequent and Final Call    Subsequent and Final Calls  Do you have any ongoing symptoms?: No  Have your medications changed?: No  Do you have any questions related to your medications?: No  Do you currently have any active services?: No  Do you have any needs or concerns that I can assist you with?: Yes  Patient-reported Needs or Concerns: cost of Brilinta  Identified Barriers: Financial  Care Transitions Interventions   Medication Assistance Program: Completed     Other Interventions:         Spoke with patient for follow up care transition call. Due to Brilinta and Aspirin, bleeding precautions reviewed with patient. Patient denies any abnormal or uncontrolled bleeding. Patient instructed to report any abnormal bleeding and to call 911 for any uncontrolled bleeding or injury to head. Patient verbalized understanding. Patient reports she is avoiding caffeine prior to direction from Cardiology. Patientdenies any further needs, questions, or concerns at this time other than those being addressed. No further outreach scheduled at this time. Episode to be resolved and CTN to sign off.     Follow Up  Future Appointments   Date Time Provider Yosi Duran   5/4/2021  1:00 PM DO David Juarez   5/17/2021 11:00 AM Quin Villavicencio MD [facility]   7/12/2021  9:45 AM DO David JuarezSY NATHANIEL Nichols RN

## 2021-05-04 PROBLEM — R94.39 POSITIVE CARDIAC STRESS TEST: Status: RESOLVED | Noted: 2021-04-19 | Resolved: 2021-05-04

## 2021-05-04 PROBLEM — R07.9 CHEST PAIN: Status: RESOLVED | Noted: 2020-08-19 | Resolved: 2021-05-04

## 2021-05-04 PROBLEM — I25.10 CORONARY ARTERY DISEASE INVOLVING NATIVE CORONARY ARTERY OF NATIVE HEART WITHOUT ANGINA PECTORIS: Status: RESOLVED | Noted: 2019-03-22 | Resolved: 2021-05-04

## 2021-05-05 DIAGNOSIS — I10 ESSENTIAL HYPERTENSION: ICD-10-CM

## 2021-05-05 DIAGNOSIS — E03.9 ACQUIRED HYPOTHYROIDISM: ICD-10-CM

## 2021-05-05 LAB
ALBUMIN SERPL-MCNC: 4.3 G/DL (ref 3.5–5.2)
ALP BLD-CCNC: 81 U/L (ref 35–104)
ALT SERPL-CCNC: 10 U/L (ref 0–32)
ANION GAP SERPL CALCULATED.3IONS-SCNC: 11 MMOL/L (ref 7–16)
AST SERPL-CCNC: 15 U/L (ref 0–31)
BASOPHILS ABSOLUTE: 0.03 E9/L (ref 0–0.2)
BASOPHILS RELATIVE PERCENT: 0.5 % (ref 0–2)
BILIRUB SERPL-MCNC: 0.4 MG/DL (ref 0–1.2)
BUN BLDV-MCNC: 12 MG/DL (ref 6–23)
CALCIUM SERPL-MCNC: 9.5 MG/DL (ref 8.6–10.2)
CHLORIDE BLD-SCNC: 105 MMOL/L (ref 98–107)
CHOLESTEROL, TOTAL: 115 MG/DL (ref 0–199)
CO2: 27 MMOL/L (ref 22–29)
CREAT SERPL-MCNC: 0.7 MG/DL (ref 0.5–1)
EOSINOPHILS ABSOLUTE: 0.16 E9/L (ref 0.05–0.5)
EOSINOPHILS RELATIVE PERCENT: 2.5 % (ref 0–6)
GFR AFRICAN AMERICAN: >60
GFR NON-AFRICAN AMERICAN: >60 ML/MIN/1.73
GLUCOSE FASTING: 102 MG/DL (ref 74–99)
HCT VFR BLD CALC: 32.6 % (ref 34–48)
HDLC SERPL-MCNC: 45 MG/DL
HEMOGLOBIN: 9.9 G/DL (ref 11.5–15.5)
IMMATURE GRANULOCYTES #: 0.03 E9/L
IMMATURE GRANULOCYTES %: 0.5 % (ref 0–5)
LDL CHOLESTEROL CALCULATED: 46 MG/DL (ref 0–99)
LYMPHOCYTES ABSOLUTE: 0.75 E9/L (ref 1.5–4)
LYMPHOCYTES RELATIVE PERCENT: 11.9 % (ref 20–42)
MCH RBC QN AUTO: 26.1 PG (ref 26–35)
MCHC RBC AUTO-ENTMCNC: 30.4 % (ref 32–34.5)
MCV RBC AUTO: 85.8 FL (ref 80–99.9)
MONOCYTES ABSOLUTE: 0.55 E9/L (ref 0.1–0.95)
MONOCYTES RELATIVE PERCENT: 8.7 % (ref 2–12)
NEUTROPHILS ABSOLUTE: 4.8 E9/L (ref 1.8–7.3)
NEUTROPHILS RELATIVE PERCENT: 75.9 % (ref 43–80)
PDW BLD-RTO: 14.6 FL (ref 11.5–15)
PLATELET # BLD: 207 E9/L (ref 130–450)
PMV BLD AUTO: 11.9 FL (ref 7–12)
POTASSIUM SERPL-SCNC: 4.2 MMOL/L (ref 3.5–5)
RBC # BLD: 3.8 E12/L (ref 3.5–5.5)
SODIUM BLD-SCNC: 143 MMOL/L (ref 132–146)
T4 FREE: 1.45 NG/DL (ref 0.93–1.7)
TOTAL PROTEIN: 7.9 G/DL (ref 6.4–8.3)
TRIGL SERPL-MCNC: 118 MG/DL (ref 0–149)
VLDLC SERPL CALC-MCNC: 24 MG/DL
WBC # BLD: 6.3 E9/L (ref 4.5–11.5)

## 2021-05-06 LAB — TSH SERPL DL<=0.05 MIU/L-ACNC: 0.7 UIU/ML (ref 0.27–4.2)

## 2021-05-17 ENCOUNTER — OFFICE VISIT (OUTPATIENT)
Dept: CARDIOLOGY CLINIC | Age: 71
End: 2021-05-17
Payer: MEDICARE

## 2021-05-17 VITALS
DIASTOLIC BLOOD PRESSURE: 72 MMHG | WEIGHT: 186.8 LBS | RESPIRATION RATE: 20 BRPM | BODY MASS INDEX: 37.66 KG/M2 | HEART RATE: 77 BPM | HEIGHT: 59 IN | SYSTOLIC BLOOD PRESSURE: 128 MMHG | OXYGEN SATURATION: 96 %

## 2021-05-17 DIAGNOSIS — I25.10 CORONARY ARTERY DISEASE INVOLVING NATIVE CORONARY ARTERY OF NATIVE HEART WITHOUT ANGINA PECTORIS: Primary | ICD-10-CM

## 2021-05-17 DIAGNOSIS — Q21.12 PATENT FORAMEN OVALE: ICD-10-CM

## 2021-05-17 PROCEDURE — G8427 DOCREV CUR MEDS BY ELIG CLIN: HCPCS | Performed by: INTERNAL MEDICINE

## 2021-05-17 PROCEDURE — G8417 CALC BMI ABV UP PARAM F/U: HCPCS | Performed by: INTERNAL MEDICINE

## 2021-05-17 PROCEDURE — 1123F ACP DISCUSS/DSCN MKR DOCD: CPT | Performed by: INTERNAL MEDICINE

## 2021-05-17 PROCEDURE — 4040F PNEUMOC VAC/ADMIN/RCVD: CPT | Performed by: INTERNAL MEDICINE

## 2021-05-17 PROCEDURE — 99214 OFFICE O/P EST MOD 30 MIN: CPT | Performed by: INTERNAL MEDICINE

## 2021-05-17 PROCEDURE — 1036F TOBACCO NON-USER: CPT | Performed by: INTERNAL MEDICINE

## 2021-05-17 PROCEDURE — 1090F PRES/ABSN URINE INCON ASSESS: CPT | Performed by: INTERNAL MEDICINE

## 2021-05-17 PROCEDURE — 3017F COLORECTAL CA SCREEN DOC REV: CPT | Performed by: INTERNAL MEDICINE

## 2021-05-17 PROCEDURE — G8400 PT W/DXA NO RESULTS DOC: HCPCS | Performed by: INTERNAL MEDICINE

## 2021-05-17 PROCEDURE — 93000 ELECTROCARDIOGRAM COMPLETE: CPT | Performed by: INTERNAL MEDICINE

## 2021-05-17 PROCEDURE — 1111F DSCHRG MED/CURRENT MED MERGE: CPT | Performed by: INTERNAL MEDICINE

## 2021-05-17 ASSESSMENT — ENCOUNTER SYMPTOMS
NAUSEA: 0
CONSTIPATION: 0
BACK PAIN: 0
DIARRHEA: 1
VOMITING: 0
ABDOMINAL PAIN: 0
WHEEZING: 0
COUGH: 0
SHORTNESS OF BREATH: 1
BLOOD IN STOOL: 0

## 2021-05-17 NOTE — PROGRESS NOTES
OUTPATIENT CARDIOLOGY FOLLOW-UP    HPI:    Name: Kayla Leal    Age: 72 y.o. Primary Care Physician: Hilton Romero DO    Date of Service: 5/17/2021    Chief Complaint:   Chief Complaint   Patient presents with    Coronary Artery Disease     Post hosp x2. Wore 30 day monitor 2/22/21. Stress done 4/2/21. S/P LHC 4/5/21. Limited echo 4/8/21. S/P staged PCI 4/19/21. Right & left fem cath sites are healed; no pain or numbness. Labs 5/5/21. Is c/o SOBOE. No other cardiac complaints. Pt said she had diarrhea x2 days with some bright, red blood in her stool; none since then.  Cardiomyopathy    Hypertension    Hyperlipidemia    Shortness of Breath        History of present illness : 78-year-old obese female who comes today for follow-up. Patient underwent successful staged PCI of her proximal to mid RCA on 4/19/2021. She has history of CAD, cardiomyopathy with ejection fraction of 45-50%, hypertension, hyperlipidemia, PFO, right axillary stenosis secondary to thoracic outlet syndrome, hypothyroidism, and iatrogenic retroperitoneal bleed during left heart catheterization via the right femoral artery access. Patient feels good. She stated that she felt fluttering in her chest for couple of times after her PCI. She feels little chest discomfort at rest.  She has been complaining of chronic dyspnea on exertion. She feels dizzy at times but denies syncope. She denies lower extremity edema. EKG done today revealed sinus rhythm at 77 bpm, left ventricular hypertrophy with nonspecific T wave changes. Review of Systems:   Review of Systems   Constitutional: Negative for chills, fatigue and fever. HENT: Negative for nosebleeds. Respiratory: Positive for shortness of breath. Negative for cough and wheezing. Cardiovascular: Positive for chest pain. Gastrointestinal: Positive for diarrhea. Negative for abdominal pain, blood in stool, constipation, nausea and vomiting. Genitourinary: Negative for dysuria and hematuria. Musculoskeletal: Negative for back pain, joint swelling and myalgias. Low back pain. Neurological: Negative for syncope and light-headedness. Psychiatric/Behavioral: The patient is not nervous/anxious. Past Medical History:  Past Medical History:   Diagnosis Date    Arthritis     CAD (coronary artery disease)     Cerebrovascular disease     Chronic back pain     Congenital heart disease     CVA (cerebral vascular accident) ([facility] 4/1949    Embolic. ? tia?  Depression     Fracture of right shoulder 1/2011    Frequent UTI     GERD (gastroesophageal reflux disease)     H/O ligation of vein 1974 Bilaterally.  Hiatal hernia 2000    History of blood transfusion     History of thoracic outlet syndrome     Hx of blood clots     Hypercholesterolemia     Hypertension     Hypothyroidism     Kidney stones     Nephrolithiasis     Bilaterally.  Nonischemic cardiomyopathy ([facility] 6/25/2018    ROSALVA (obstructive sleep apnea) 04/16/2018    mild    Osteoarthritis     PFO (patent foramen ovale)     Pneumonia 1989    Thyroid disease     Hypothyroidism. Past Surgical History:  Past Surgical History:   Procedure Laterality Date    BRONCHOSCOPY N/A 8/21/2020    BRONCHOSCOPY performed by Cruz Barrett MD at [address] [place] [address]  03/05/2018    No significant CAD  (Dr Gianna Hough)    COLONOSCOPY  11/04    Negative.  COLONOSCOPY  09/17/2013    COLONOSCOPY  12/30/2016    Sigmoid Colitis, Diverticulitis    CORONARY ANGIOPLASTY WITH STENT PLACEMENT  04/19/2021    Dr. Merline Speedy- [facility] [place] DALIA 2.5x22 Mid RCA    DIAGNOSTIC CARDIAC CATH LAB PROCEDURE  5/17/1988    [facility], patent coronaries.  DIAGNOSTIC CARDIAC CATH LAB PROCEDURE  6/10/1992    [facility], patent coronaries.  DIAGNOSTIC CARDIAC CATH LAB PROCEDURE  10/26/2001    [facility], Normal cath, right femoral angiography and Perclose.    Paul Gupta CATH LAB PROCEDURE  2005    [facility].    HYSTERECTOMY  1983    LITHOTRIPSY     Andrea Rogers replaced in right ear.  TONSILLECTOMY      URETHRA SURGERY      VASCULAR SURGERY      veil ligation kylee legs       Family History:  Family History   Problem Relation Age of Onset    Coronary Art Dis Mother         s/p CABG    Alzheimer's Disease Mother     Hypertension Sister     High Cholesterol Sister     Hypertension Sister     High Cholesterol Sister     Heart Disease Brother 72        ACB       Social History:  Social History     Socioeconomic History    Marital status:      Spouse name: Not on file    Number of children: Not on file    Years of education: Not on file    Highest education level: Not on file   Occupational History    Not on file   Tobacco Use    Smoking status: Former Smoker     Packs/day: 3.00     Years: 24.00     Pack years: 72.00     Types: Cigarettes     Start date: 66     Quit date: 1999     Years since quittin.3    Smokeless tobacco: Never Used   Vaping Use    Vaping Use: Never used   Substance and Sexual Activity    Alcohol use: Not Currently    Drug use: Never    Sexual activity: Not Currently   Other Topics Concern    Not on file   Social History Narrative    Denies caffeine. Social Determinants of Health     Financial Resource Strain:     Difficulty of Paying Living Expenses:    Food Insecurity:     Worried About Running Out of Food in the Last Year:     [address] in the Last Year:    Transportation Needs:     Lack of Transportation (Medical):      Lack of Transportation (Non-Medical):    Physical Activity:     Days of Exercise per Week:     Minutes of Exercise per Session:    Stress:     Feeling of Stress :    Social Connections:     Frequency of Communication with Friends and Family:     Frequency of Social Gatherings with Friends and Family:     Attends Buddhist Services:     Active Member of Clubs or Organizations:     Attends Club or Organization Meetings:     Marital Status:    Intimate Partner Violence:     Fear of Current or Ex-Partner:     Emotionally Abused:     Physically Abused:     Sexually Abused: Allergies: Allergies   Allergen Reactions    Biaxin [Clarithromycin] Nausea And Vomiting and Other (See Comments)     Abdominal pain    Codeine Nausea And Vomiting    Neosporin [Bacitracin-Neomycin-Polymyxin] Swelling    Sulfa Antibiotics Itching and Nausea And Vomiting     decrease in Blood pressure.  Clarithromycin Other (See Comments)    Neomycin-Polymyxin-Gramicidin Swelling     redness    Septra [Sulfamethoxazole-Trimethoprim]     Vioxx [Rofecoxib]     Sulfasalazine Nausea And Vomiting     severe headaches.        Current Medications:  Current Outpatient Medications   Medication Sig Dispense Refill    aspirin 81 MG EC tablet Take 1 tablet by mouth daily 30 tablet 3    ticagrelor (BRILINTA) 90 MG TABS tablet Take 1 tablet by mouth 2 times daily 60 tablet 5    nitroGLYCERIN (NITROSTAT) 0.4 MG SL tablet Place 1 tablet under the tongue every 5 minutes as needed for Chest pain 25 tablet 3    sertraline (ZOLOFT) 50 MG tablet Take 1 tablet by mouth nightly 90 tablet 1    rosuvastatin (CRESTOR) 20 MG tablet Take 1 tablet by mouth nightly 30 tablet 3    ferrous sulfate (IRON 325) 325 (65 Fe) MG tablet Take 1 tablet by mouth daily (with breakfast) 30 tablet 3    fluticasone (FLONASE) 50 MCG/ACT nasal spray 2 sprays by Nasal route daily      sucralfate (CARAFATE) 1 GM tablet Take 1 tablet by mouth 2 times daily 180 tablet 1    carvedilol (COREG) 3.125 MG tablet Take 1 tablet by mouth 2 times daily (with meals) 180 tablet 1    isosorbide mononitrate (IMDUR) 30 MG extended release tablet Take 1 tablet by mouth daily 30 tablet 5    levothyroxine (SYNTHROID) 100 MCG tablet Take 1 tablet by mouth Daily 90 tablet 1    omeprazole (PRILOSEC) 20 MG delayed release capsule Take 20 mg by 85.8 05/05/2021     05/05/2021     Lab Results   Component Value Date    ALT 10 05/05/2021    AST 15 05/05/2021    ALKPHOS 81 05/05/2021    BILITOT 0.4 05/05/2021     Lab Results   Component Value Date    CKTOTAL 38 04/23/2011    TROPONINI 0.04 (H) 04/02/2021    TROPONINI 0.03 04/02/2021    TROPONINI <0.01 08/20/2020     Lab Results   Component Value Date    INR 1.1 04/17/2021    INR 1.0 04/02/2021    INR 1.0 06/18/2020    PROTIME 12.3 04/17/2021    PROTIME 10.9 04/02/2021    PROTIME 11.1 06/18/2020     Lab Results   Component Value Date    TSH 0.697 05/05/2021       Lab Results   Component Value Date    CHOL 115 05/05/2021    CHOL 170 01/11/2021    CHOL 162 10/26/2020     Lab Results   Component Value Date    TRIG 118 05/05/2021    TRIG 99 01/11/2021    TRIG 141 10/26/2020     Lab Results   Component Value Date    HDL 45 05/05/2021    HDL 51 01/11/2021    HDL 47 10/26/2020     Lab Results   Component Value Date    LDLCALC 46 05/05/2021    LDLCALC 99 01/11/2021    LDLCALC 87 10/26/2020     Lab Results   Component Value Date    LABVLDL 24 05/05/2021    LABVLDL 20 01/11/2021    LABVLDL 28 10/26/2020       Cardiac Tests:  Event recorder done on 2/22/2021 till 3/20/2021 revealed sinus rhythm at 79 bpm.      ASSESSMENT / PLAN:  -CAD: Post post with drug-eluting stent to the proximal to mid RCA.  Stable with no angina. Chest pain is most likely atypical in nature.  -Status post right common femoral artery iatrogenic injury complicated by retroperitoneal bleed.    -Chronic dyspnea on exertion: Probably multifactorial in origin including probable diastolic dysfunction due to hypertension, anemia, obesity and deconditioning.  -Anemia: Probably due to retroperitoneal bleeding and hydration.  -Hypertension: Controlled. -Hyperlipidemia: On Crestor.  -PFO. Patient was told in the past that she needs to have it closed. Now patient is requesting PFO closure.   -Right axillary artery stenosis secondary to thoracic outlet syndrome.  -Hypothyroidism.  -Obesity. We will continue with DAPT for 1 year. We will continue her other cardiac medications. Will refer to Dr. Cameron Locke for possible PFO closure. Patient was advised to have low-salt diet and to lose weight. We will follow-up at the office in 1 year.       The patient's current medication list, allergies, problem list and results of all previously ordered testing were reviewed at today's visit. {  Rosalee Nolan MD , [facility] [place], [place].   [facility]) Cardiology

## 2021-05-19 ENCOUNTER — HOSPITAL ENCOUNTER (OUTPATIENT)
Dept: CARDIAC REHAB | Age: 71
Setting detail: THERAPIES SERIES
Discharge: HOME | End: 2021-05-19
Payer: MEDICARE

## 2021-05-19 VITALS
DIASTOLIC BLOOD PRESSURE: 73 MMHG | RESPIRATION RATE: 20 BRPM | BODY MASS INDEX: 37.54 KG/M2 | WEIGHT: 186.2 LBS | HEIGHT: 59 IN | HEART RATE: 68 BPM | SYSTOLIC BLOOD PRESSURE: 124 MMHG | OXYGEN SATURATION: 96 %

## 2021-05-19 PROCEDURE — 93798 PHYS/QHP OP CAR RHAB W/ECG: CPT

## 2021-05-24 ENCOUNTER — HOSPITAL ENCOUNTER (OUTPATIENT)
Dept: CARDIAC REHAB | Age: 71
Setting detail: THERAPIES SERIES
Discharge: HOME | End: 2021-05-24
Payer: MEDICARE

## 2021-05-24 PROCEDURE — 93798 PHYS/QHP OP CAR RHAB W/ECG: CPT

## 2021-05-28 ENCOUNTER — HOSPITAL ENCOUNTER (OUTPATIENT)
Dept: CARDIAC REHAB | Age: 71
Setting detail: THERAPIES SERIES
Discharge: HOME | End: 2021-05-28
Payer: MEDICARE

## 2021-05-28 PROCEDURE — 93798 PHYS/QHP OP CAR RHAB W/ECG: CPT

## 2021-06-03 ENCOUNTER — HOSPITAL ENCOUNTER (OUTPATIENT)
Dept: CARDIAC REHAB | Age: 71
Setting detail: THERAPIES SERIES
Discharge: HOME | End: 2021-06-03
Payer: MEDICARE

## 2021-06-03 PROCEDURE — 93798 PHYS/QHP OP CAR RHAB W/ECG: CPT

## 2021-06-07 ENCOUNTER — HOSPITAL ENCOUNTER (OUTPATIENT)
Dept: CARDIAC REHAB | Age: 71
Setting detail: THERAPIES SERIES
Discharge: HOME | End: 2021-06-07
Payer: MEDICARE

## 2021-06-07 PROCEDURE — 93798 PHYS/QHP OP CAR RHAB W/ECG: CPT

## 2021-06-08 ENCOUNTER — HOSPITAL ENCOUNTER (OUTPATIENT)
Dept: CARDIAC REHAB | Age: 71
Setting detail: THERAPIES SERIES
Discharge: HOME | End: 2021-06-08
Payer: MEDICARE

## 2021-06-08 PROCEDURE — 93798 PHYS/QHP OP CAR RHAB W/ECG: CPT

## 2021-06-10 ENCOUNTER — HOSPITAL ENCOUNTER (OUTPATIENT)
Dept: CARDIAC REHAB | Age: 71
Setting detail: THERAPIES SERIES
Discharge: HOME | End: 2021-06-10
Payer: MEDICARE

## 2021-06-10 PROCEDURE — 93798 PHYS/QHP OP CAR RHAB W/ECG: CPT

## 2021-06-11 ENCOUNTER — APPOINTMENT (OUTPATIENT)
Dept: CARDIAC REHAB | Age: 71
End: 2021-06-11
Payer: MEDICARE

## 2021-06-14 ENCOUNTER — HOSPITAL ENCOUNTER (OUTPATIENT)
Dept: CARDIAC REHAB | Age: 71
Setting detail: THERAPIES SERIES
Discharge: HOME | End: 2021-06-14
Payer: MEDICARE

## 2021-06-14 PROCEDURE — 93798 PHYS/QHP OP CAR RHAB W/ECG: CPT

## 2021-06-15 ENCOUNTER — HOSPITAL ENCOUNTER (OUTPATIENT)
Dept: CARDIAC REHAB | Age: 71
Setting detail: THERAPIES SERIES
Discharge: HOME | End: 2021-06-15
Payer: MEDICARE

## 2021-06-15 PROCEDURE — 93798 PHYS/QHP OP CAR RHAB W/ECG: CPT

## 2021-06-18 ENCOUNTER — APPOINTMENT (OUTPATIENT)
Dept: CARDIAC REHAB | Age: 71
End: 2021-06-18
Payer: MEDICARE

## 2021-06-21 ENCOUNTER — HOSPITAL ENCOUNTER (OUTPATIENT)
Dept: CARDIAC REHAB | Age: 71
Setting detail: THERAPIES SERIES
Discharge: HOME | End: 2021-06-21
Payer: MEDICARE

## 2021-06-21 PROCEDURE — 93798 PHYS/QHP OP CAR RHAB W/ECG: CPT

## 2021-06-22 ENCOUNTER — HOSPITAL ENCOUNTER (OUTPATIENT)
Dept: CARDIAC REHAB | Age: 71
Setting detail: THERAPIES SERIES
Discharge: HOME | End: 2021-06-22
Payer: MEDICARE

## 2021-06-22 PROCEDURE — 93798 PHYS/QHP OP CAR RHAB W/ECG: CPT

## 2021-06-25 ENCOUNTER — APPOINTMENT (OUTPATIENT)
Dept: CARDIAC REHAB | Age: 71
End: 2021-06-25
Payer: MEDICARE

## 2021-06-28 ENCOUNTER — HOSPITAL ENCOUNTER (OUTPATIENT)
Dept: CARDIAC REHAB | Age: 71
Setting detail: THERAPIES SERIES
Discharge: HOME | End: 2021-06-28
Payer: MEDICARE

## 2021-06-28 PROCEDURE — 93798 PHYS/QHP OP CAR RHAB W/ECG: CPT

## 2021-06-29 ENCOUNTER — HOSPITAL ENCOUNTER (OUTPATIENT)
Dept: CARDIAC REHAB | Age: 71
Setting detail: THERAPIES SERIES
Discharge: HOME | End: 2021-06-29
Payer: MEDICARE

## 2021-06-29 PROCEDURE — 93798 PHYS/QHP OP CAR RHAB W/ECG: CPT

## 2021-07-01 ENCOUNTER — HOSPITAL ENCOUNTER (OUTPATIENT)
Dept: CARDIAC REHAB | Age: 71
Setting detail: THERAPIES SERIES
Discharge: HOME | End: 2021-07-01
Payer: MEDICARE

## 2021-07-01 PROCEDURE — 93798 PHYS/QHP OP CAR RHAB W/ECG: CPT

## 2021-07-02 ENCOUNTER — APPOINTMENT (OUTPATIENT)
Dept: CARDIAC REHAB | Age: 71
End: 2021-07-02
Payer: MEDICARE

## 2021-07-07 ENCOUNTER — HOSPITAL ENCOUNTER (OUTPATIENT)
Dept: CARDIAC REHAB | Age: 71
Setting detail: THERAPIES SERIES
Discharge: HOME | End: 2021-07-07
Payer: MEDICARE

## 2021-07-07 PROCEDURE — 93798 PHYS/QHP OP CAR RHAB W/ECG: CPT

## 2021-07-08 ENCOUNTER — HOSPITAL ENCOUNTER (OUTPATIENT)
Dept: CARDIAC REHAB | Age: 71
Setting detail: THERAPIES SERIES
Discharge: HOME | End: 2021-07-08
Payer: MEDICARE

## 2021-07-08 PROCEDURE — 93798 PHYS/QHP OP CAR RHAB W/ECG: CPT

## 2021-07-09 ENCOUNTER — APPOINTMENT (OUTPATIENT)
Dept: CARDIAC REHAB | Age: 71
End: 2021-07-09
Payer: MEDICARE

## 2021-07-12 ENCOUNTER — HOSPITAL ENCOUNTER (OUTPATIENT)
Dept: CARDIAC REHAB | Age: 71
Setting detail: THERAPIES SERIES
Discharge: HOME | End: 2021-07-12
Payer: MEDICARE

## 2021-07-12 ENCOUNTER — OFFICE VISIT (OUTPATIENT)
Dept: CARDIOLOGY CLINIC | Age: 71
End: 2021-07-12
Payer: MEDICARE

## 2021-07-12 VITALS
HEART RATE: 68 BPM | RESPIRATION RATE: 18 BRPM | WEIGHT: 183 LBS | SYSTOLIC BLOOD PRESSURE: 118 MMHG | HEIGHT: 59 IN | DIASTOLIC BLOOD PRESSURE: 64 MMHG | BODY MASS INDEX: 36.89 KG/M2

## 2021-07-12 DIAGNOSIS — Q21.12 PFO (PATENT FORAMEN OVALE): Primary | ICD-10-CM

## 2021-07-12 PROCEDURE — 99214 OFFICE O/P EST MOD 30 MIN: CPT | Performed by: INTERNAL MEDICINE

## 2021-07-12 PROCEDURE — G8427 DOCREV CUR MEDS BY ELIG CLIN: HCPCS | Performed by: INTERNAL MEDICINE

## 2021-07-12 PROCEDURE — 93798 PHYS/QHP OP CAR RHAB W/ECG: CPT

## 2021-07-12 PROCEDURE — 3017F COLORECTAL CA SCREEN DOC REV: CPT | Performed by: INTERNAL MEDICINE

## 2021-07-12 PROCEDURE — 1036F TOBACCO NON-USER: CPT | Performed by: INTERNAL MEDICINE

## 2021-07-12 PROCEDURE — 1123F ACP DISCUSS/DSCN MKR DOCD: CPT | Performed by: INTERNAL MEDICINE

## 2021-07-12 PROCEDURE — G8400 PT W/DXA NO RESULTS DOC: HCPCS | Performed by: INTERNAL MEDICINE

## 2021-07-12 PROCEDURE — G8417 CALC BMI ABV UP PARAM F/U: HCPCS | Performed by: INTERNAL MEDICINE

## 2021-07-12 PROCEDURE — 93000 ELECTROCARDIOGRAM COMPLETE: CPT | Performed by: INTERNAL MEDICINE

## 2021-07-12 PROCEDURE — 1090F PRES/ABSN URINE INCON ASSESS: CPT | Performed by: INTERNAL MEDICINE

## 2021-07-12 PROCEDURE — 4040F PNEUMOC VAC/ADMIN/RCVD: CPT | Performed by: INTERNAL MEDICINE

## 2021-07-12 NOTE — PROGRESS NOTES
[facility]   Heart and Vascular [place]   Clinic Note     Date:7/12/21   Patient Name:Nicole Rebollar  YOB: 1950  Age: 72 y.o. Primary Care Provider: Shraddha Yan DO    Subjective     76-y she has ear-old  female who is referred for consideration of PFO closure. She has been doing well since her PCI but has intermittent lower extremity swelling and twinges in her chest.  She has no angina. She describes easy bruising but no pathologic bleeding otherwise. She has no syncope or presyncope or palpitations. She has stable dyspnea    A focused history review includes:  1. CAD:  a. June 2020: Mid RCA 50% with IFR 0.92, nonobstructive elsewhere  b. Presented with chest pain and abnormal MPS April 2021:   c. PCI to the mid RCA in April 2021 with single DALIA (Dr. Dc Christianson). Nonobstructive disease elsewhere. PCI was complicated by iatrogenic right external iliac dissection and perforation treated conservatively  d. TTE 4/2021 with mildly reduced EF out of proportion to CAD. Mild AR. 2. Hypertension  3. Thoracic outlet syndrome  4. No diabetes  5. ? [facility] or [facility]. Small vessel disease. b. 30-day monitor in February 2021 with no AF      Past History    Past Medical History:         Diagnosis Date    Arthritis     CAD (coronary artery disease)     Cerebrovascular disease     Chronic back pain     Congenital heart disease     CVA (cerebral vascular accident) ([address] 3/7232    Embolic. ? tia?  Depression     Fracture of right shoulder 1/2011    Frequent UTI     GERD (gastroesophageal reflux disease)     H/O ligation of vein 1974 Bilaterally.  Hiatal hernia 2000    History of blood transfusion     History of thoracic outlet syndrome     Hx of blood clots     Hypercholesterolemia     Hypertension     Hypothyroidism     Kidney stones     Nephrolithiasis     Bilaterally.     Nonischemic cardiomyopathy ([address] 6/25/2018    ROSALVA (obstructive sleep apnea) 04/16/2018    mild    Osteoarthritis     PFO (patent foramen ovale)     Pneumonia 1989    Thyroid disease     Hypothyroidism. Past Surgical History:  Past Surgical History:   Procedure Laterality Date    BRONCHOSCOPY N/A 8/21/2020    BRONCHOSCOPY performed by Jacob Guerra MD at [address]  03/05/2018    No significant CAD  (Dr Nicolasa [place])    COLONOSCOPY  11/04    Negative.  COLONOSCOPY  09/17/2013    COLONOSCOPY  12/30/2016    Sigmoid Colitis, Diverticulitis    CORONARY ANGIOPLASTY  04/19/2021    stent x 1 to the RCA    CORONARY ANGIOPLASTY WITH STENT PLACEMENT  04/19/2021    Dr. Bari Rubi- [facility] [place] DALIA 2.5x22 Mid RCA    DIAGNOSTIC CARDIAC CATH LAB PROCEDURE  5/17/1988    [facility], patent coronaries.  DIAGNOSTIC CARDIAC CATH LAB PROCEDURE  6/10/1992    [facility], patent coronaries.  DIAGNOSTIC CARDIAC CATH LAB PROCEDURE  10/26/2001    [facility], Normal cath, right femoral angiography and Perclose.  DIAGNOSTIC CARDIAC CATH LAB PROCEDURE  12/7/2005    [facility].    HYSTERECTOMY  1983    LITHOTRIPSY      MIDDLE EAR SURGERY  1987    Eardrum replaced in right ear.     TONSILLECTOMY      URETHRA SURGERY      VASCULAR SURGERY      veil ligation kylee legs       Social History:    Social History     Tobacco History     Smoking Status  Former Smoker Smoking Start Date  1/1/1975 Quit date  1/1/1999 Smoking Frequency  3 packs/day for 20 years (61 pk yrs)    Smoking Tobacco Type  Cigarettes    Smokeless Tobacco Use  Never Used    Tobacco Comment  smoked 2-3 PPD before quittting          Alcohol History     Alcohol Use Status  Not Currently Drinks/Week  0 Standard drinks or equivalent per week          Drug Use     Drug Use Status  Never          Sexual Activity     Sexually Active  Not Currently                    Family History:-      Problem Relation Age of Onset    Coronary Art Dis Mother         s/p CABG    Alzheimer's Disease meters squared. General: No acute distress, appears as stated age, nonicteric  Head: Atraumatic, no gross abnormalities or bruises  Neck: Supple and nontender, no carotid bruits, no JVP  Lungs: Clear to auscultation bilaterally, no wheezes, rales, or rhonchi  Heart: Regular rate and rhythm, no murmurs, rubs, or gallops  Abdomen: Soft, nontender, nondistended, normal bowel sounds  Extremities: No obvious deformities, no cyanosis, no edema  Neurological: Alert and oriented x3, EOMI, moving all extremities x4  Psychological: Normal mood and affect, cooperative  Skin: Color, texture, and turgor normal for age          Labs/Imaging/Diagnostics   Personally reviewed:    Lab Results   Component Value Date     05/05/2021    K 4.2 05/05/2021    K 3.7 08/19/2020     05/05/2021    CO2 27 05/05/2021    BUN 12 05/05/2021    CREATININE 0.7 05/05/2021    GLUCOSE 114 04/20/2021    GLUCOSE 105 04/22/2011    CALCIUM 9.5 05/05/2021        CrCl cannot be calculated (Unknown ideal weight.).     Lab Results   Component Value Date    WBC 6.3 05/05/2021    HGB 9.9 (L) 05/05/2021    HCT 32.6 (L) 05/05/2021    MCV 85.8 05/05/2021     05/05/2021       Lab Results   Component Value Date    ALT 10 05/05/2021    AST 15 05/05/2021    ALKPHOS 81 05/05/2021    BILITOT 0.4 05/05/2021       Lab Results   Component Value Date    LABALBU 4.3 05/05/2021       Lab Results   Component Value Date    CHOL 115 05/05/2021    CHOL 170 01/11/2021    CHOL 162 10/26/2020     Lab Results   Component Value Date    TRIG 118 05/05/2021    TRIG 99 01/11/2021    TRIG 141 10/26/2020     Lab Results   Component Value Date    HDL 45 05/05/2021    HDL 51 01/11/2021    HDL 47 10/26/2020     Lab Results   Component Value Date    LDLCALC 46 05/05/2021    LDLCALC 99 01/11/2021    LDLCALC 87 10/26/2020     Lab Results   Component Value Date    LABVLDL 24 05/05/2021    LABVLDL 20 01/11/2021    LABVLDL 28 10/26/2020     No results found for: CHOLHDLRATIO    Lab Results   Component Value Date    CKTOTAL 38 04/23/2011    TROPONINI 0.04 (H) 04/02/2021       Lab Results   Component Value Date    BNP 41 04/23/2011         No results found for: LABA1C  No results found for: EAG     Personally interpreted the following studies.:  EKG normal sinus rhythm with probable LVH and nonspecific ST-T abnormality. Assessment and Plan:        78-year-old  female with a history noted above. From a PFO standpoint, she had a questionable TIA in 2007 without any recurrence. Her most recent CT brain did not show evidence of an old infarct. We discussed the indications for percutaneous PFO closure for prevention of cryptogenic ischemic stroke. Due to her RoPE score of 3-4 and the overall clinical picture I explained that I do not believe PFO closure is indicated at this time. Diagnosis Orders   1. PFO (patent foramen ovale)  EKG 12 Lead         If she were to develop a recurrent TIA or stroke objectively without an alternative etiology then  consideration for closure may be made     Assessment and plan reviewed with the patient/family and their questions and concerns answered in full.  Follow up with me as needed. She will continue to follow-up with Dr. Ramirez Million    We appreciate the opportunity to participate in Her care!       Sohan Maharaj MD, [facility] [place]  Interventional Cardiology/Structural Heart Disease  Office: [phone]  Fax: [phone]  Office Coordinators: Camden William RN

## 2021-07-12 NOTE — PATIENT INSTRUCTIONS
1. No changes in your medications  2. Continue following up with Dr. Wu Lynch  3. If you were to have recurrent TIA or stroke in the future then please make an appointment to come back to see us  4.  Return as needed

## 2021-07-14 ENCOUNTER — HOSPITAL ENCOUNTER (OUTPATIENT)
Dept: CARDIAC REHAB | Age: 71
Setting detail: THERAPIES SERIES
Discharge: HOME | End: 2021-07-14
Payer: MEDICARE

## 2021-07-14 PROCEDURE — 93798 PHYS/QHP OP CAR RHAB W/ECG: CPT

## 2021-07-15 ENCOUNTER — HOSPITAL ENCOUNTER (OUTPATIENT)
Dept: CARDIAC REHAB | Age: 71
Setting detail: THERAPIES SERIES
Discharge: HOME | End: 2021-07-15
Payer: MEDICARE

## 2021-07-15 PROCEDURE — 93798 PHYS/QHP OP CAR RHAB W/ECG: CPT

## 2021-07-16 ENCOUNTER — APPOINTMENT (OUTPATIENT)
Dept: CARDIAC REHAB | Age: 71
End: 2021-07-16
Payer: MEDICARE

## 2021-07-19 ENCOUNTER — HOSPITAL ENCOUNTER (OUTPATIENT)
Dept: CARDIAC REHAB | Age: 71
Setting detail: THERAPIES SERIES
Discharge: HOME | End: 2021-07-19
Payer: MEDICARE

## 2021-07-19 PROCEDURE — 93798 PHYS/QHP OP CAR RHAB W/ECG: CPT

## 2021-07-21 ENCOUNTER — HOSPITAL ENCOUNTER (OUTPATIENT)
Dept: CARDIAC REHAB | Age: 71
Setting detail: THERAPIES SERIES
Discharge: HOME | End: 2021-07-21
Payer: MEDICARE

## 2021-07-21 PROCEDURE — 93798 PHYS/QHP OP CAR RHAB W/ECG: CPT

## 2021-07-22 ENCOUNTER — HOSPITAL ENCOUNTER (OUTPATIENT)
Dept: CARDIAC REHAB | Age: 71
Setting detail: THERAPIES SERIES
Discharge: HOME | End: 2021-07-22
Payer: MEDICARE

## 2021-07-22 PROCEDURE — 93798 PHYS/QHP OP CAR RHAB W/ECG: CPT

## 2021-07-23 ENCOUNTER — APPOINTMENT (OUTPATIENT)
Dept: CARDIAC REHAB | Age: 71
End: 2021-07-23
Payer: MEDICARE

## 2021-07-30 ENCOUNTER — APPOINTMENT (OUTPATIENT)
Dept: CARDIAC REHAB | Age: 71
End: 2021-07-30
Payer: MEDICARE

## 2021-08-04 ENCOUNTER — HOSPITAL ENCOUNTER (OUTPATIENT)
Dept: CARDIAC REHAB | Age: 71
Setting detail: THERAPIES SERIES
Discharge: HOME | End: 2021-08-04
Payer: MEDICARE

## 2021-08-04 PROCEDURE — 93798 PHYS/QHP OP CAR RHAB W/ECG: CPT

## 2021-08-05 ENCOUNTER — HOSPITAL ENCOUNTER (OUTPATIENT)
Dept: CARDIAC REHAB | Age: 71
Setting detail: THERAPIES SERIES
Discharge: HOME | End: 2021-08-05
Payer: MEDICARE

## 2021-08-05 PROCEDURE — 93798 PHYS/QHP OP CAR RHAB W/ECG: CPT

## 2021-08-06 ENCOUNTER — HOSPITAL ENCOUNTER (OUTPATIENT)
Dept: CARDIAC REHAB | Age: 71
Setting detail: THERAPIES SERIES
Discharge: HOME | End: 2021-08-06
Payer: MEDICARE

## 2021-08-06 ENCOUNTER — APPOINTMENT (OUTPATIENT)
Dept: CARDIAC REHAB | Age: 71
End: 2021-08-06
Payer: MEDICARE

## 2021-08-06 PROCEDURE — 93798 PHYS/QHP OP CAR RHAB W/ECG: CPT

## 2021-08-09 ENCOUNTER — HOSPITAL ENCOUNTER (OUTPATIENT)
Dept: CARDIAC REHAB | Age: 71
Setting detail: THERAPIES SERIES
Discharge: HOME | End: 2021-08-09
Payer: MEDICARE

## 2021-08-09 PROCEDURE — 93798 PHYS/QHP OP CAR RHAB W/ECG: CPT

## 2021-08-10 ENCOUNTER — HOSPITAL ENCOUNTER (OUTPATIENT)
Dept: CARDIAC REHAB | Age: 71
Setting detail: THERAPIES SERIES
Discharge: HOME | End: 2021-08-10
Payer: MEDICARE

## 2021-08-10 PROCEDURE — 93798 PHYS/QHP OP CAR RHAB W/ECG: CPT

## 2021-08-12 ENCOUNTER — HOSPITAL ENCOUNTER (OUTPATIENT)
Dept: CARDIAC REHAB | Age: 71
Setting detail: THERAPIES SERIES
Discharge: HOME | End: 2021-08-12
Payer: MEDICARE

## 2021-08-12 PROCEDURE — 93798 PHYS/QHP OP CAR RHAB W/ECG: CPT

## 2021-08-13 ENCOUNTER — APPOINTMENT (OUTPATIENT)
Dept: CARDIAC REHAB | Age: 71
End: 2021-08-13
Payer: MEDICARE

## 2021-08-16 ENCOUNTER — HOSPITAL ENCOUNTER (OUTPATIENT)
Dept: CARDIAC REHAB | Age: 71
Setting detail: THERAPIES SERIES
Discharge: HOME | End: 2021-08-16
Payer: MEDICARE

## 2021-08-16 PROCEDURE — 93798 PHYS/QHP OP CAR RHAB W/ECG: CPT

## 2021-08-19 ENCOUNTER — HOSPITAL ENCOUNTER (OUTPATIENT)
Dept: CARDIAC REHAB | Age: 71
Setting detail: THERAPIES SERIES
Discharge: HOME | End: 2021-08-19
Payer: MEDICARE

## 2021-08-19 PROCEDURE — 93798 PHYS/QHP OP CAR RHAB W/ECG: CPT

## 2021-08-20 ENCOUNTER — APPOINTMENT (OUTPATIENT)
Dept: CARDIAC REHAB | Age: 71
End: 2021-08-20
Payer: MEDICARE

## 2021-08-23 DIAGNOSIS — I20.9 NEW-ONSET ANGINA (HCC): ICD-10-CM

## 2021-08-23 RX ORDER — ISOSORBIDE MONONITRATE 30 MG/1
TABLET, EXTENDED RELEASE ORAL
Qty: 30 TABLET | Refills: 3 | Status: SHIPPED
Start: 2021-08-23 | End: 2021-12-24

## 2021-08-27 ENCOUNTER — APPOINTMENT (OUTPATIENT)
Dept: CARDIAC REHAB | Age: 71
End: 2021-08-27
Payer: MEDICARE

## 2021-09-01 ENCOUNTER — HOSPITAL ENCOUNTER (OUTPATIENT)
Dept: CARDIAC REHAB | Age: 71
Setting detail: THERAPIES SERIES
Discharge: HOME | End: 2021-09-01
Payer: MEDICARE

## 2021-09-01 PROCEDURE — 93798 PHYS/QHP OP CAR RHAB W/ECG: CPT

## 2021-09-02 ENCOUNTER — HOSPITAL ENCOUNTER (OUTPATIENT)
Dept: CARDIAC REHAB | Age: 71
Setting detail: THERAPIES SERIES
Discharge: HOME | End: 2021-09-02
Payer: MEDICARE

## 2021-09-02 PROCEDURE — 93798 PHYS/QHP OP CAR RHAB W/ECG: CPT

## 2021-09-03 ENCOUNTER — HOSPITAL ENCOUNTER (OUTPATIENT)
Dept: CARDIAC REHAB | Age: 71
Setting detail: THERAPIES SERIES
Discharge: HOME | End: 2021-09-03
Payer: MEDICARE

## 2021-09-03 ENCOUNTER — APPOINTMENT (OUTPATIENT)
Dept: CARDIAC REHAB | Age: 71
End: 2021-09-03
Payer: MEDICARE

## 2021-09-03 PROCEDURE — 93798 PHYS/QHP OP CAR RHAB W/ECG: CPT

## 2021-09-10 ENCOUNTER — APPOINTMENT (OUTPATIENT)
Dept: CARDIAC REHAB | Age: 71
End: 2021-09-10
Payer: MEDICARE

## 2021-09-13 ENCOUNTER — HOSPITAL ENCOUNTER (OUTPATIENT)
Dept: CARDIAC REHAB | Age: 71
Setting detail: THERAPIES SERIES
Discharge: HOME | End: 2021-09-13
Payer: MEDICARE

## 2021-09-13 PROCEDURE — 93798 PHYS/QHP OP CAR RHAB W/ECG: CPT

## 2021-09-17 ENCOUNTER — APPOINTMENT (OUTPATIENT)
Dept: CARDIAC REHAB | Age: 71
End: 2021-09-17
Payer: MEDICARE

## 2021-09-20 ENCOUNTER — HOSPITAL ENCOUNTER (OUTPATIENT)
Dept: CARDIAC REHAB | Age: 71
Setting detail: THERAPIES SERIES
Discharge: HOME | End: 2021-09-20
Payer: MEDICARE

## 2021-09-20 PROCEDURE — 93798 PHYS/QHP OP CAR RHAB W/ECG: CPT

## 2021-09-21 ENCOUNTER — APPOINTMENT (OUTPATIENT)
Dept: CARDIAC REHAB | Age: 71
End: 2021-09-21
Payer: MEDICARE

## 2021-09-21 ENCOUNTER — HOSPITAL ENCOUNTER (OUTPATIENT)
Dept: CARDIAC REHAB | Age: 71
Setting detail: THERAPIES SERIES
Discharge: HOME | End: 2021-09-21
Payer: MEDICARE

## 2021-09-21 PROCEDURE — 93798 PHYS/QHP OP CAR RHAB W/ECG: CPT

## 2021-09-23 ENCOUNTER — APPOINTMENT (OUTPATIENT)
Dept: CARDIAC REHAB | Age: 71
End: 2021-09-23
Payer: MEDICARE

## 2021-09-24 ENCOUNTER — APPOINTMENT (OUTPATIENT)
Dept: CARDIAC REHAB | Age: 71
End: 2021-09-24
Payer: MEDICARE

## 2021-09-25 ENCOUNTER — CLINICAL DOCUMENTATION (OUTPATIENT)
Dept: CARDIAC REHAB | Age: 71
End: 2021-09-25

## 2021-09-25 NOTE — PROGRESS NOTES
Anat Hernandez has completed 36/36 telemetry monitored exercise sessions. Using a variety of cardiovascular equipment, she is able to sustain an average of 40 minutes, at 1.9-2.2 MET level of intensity, with proper hemodynamic response. She has lost 2.4 pounds since beginning Cardiac Rehab. She has been asymptomatic during his exercise sessions. A home exercise program handout was given and reviewed. She plans to continue exercise at [facility] at [employer]. Upon request, a detailed summary of her session readings can be sent for your review. Please call [facility] at [phone]. Thank you for allowing us to care for your patient.

## 2021-09-27 ENCOUNTER — TELEPHONE (OUTPATIENT)
Dept: CARDIOLOGY CLINIC | Age: 71
End: 2021-09-27

## 2021-09-27 ENCOUNTER — APPOINTMENT (OUTPATIENT)
Dept: CARDIAC REHAB | Age: 71
End: 2021-09-27
Payer: MEDICARE

## 2021-09-27 NOTE — TELEPHONE ENCOUNTER
Mele Zamora called. Last Thursday had heart fluttering for 5 minutes. Has had palpitations every now and again since. States she has been a little anxious (learning a new program at work) plus she feels she might be dehydrated. Next appt is 05/2022.

## 2021-09-28 ENCOUNTER — APPOINTMENT (OUTPATIENT)
Dept: CARDIAC REHAB | Age: 71
End: 2021-09-28
Payer: MEDICARE

## 2021-09-29 ENCOUNTER — NURSE ONLY (OUTPATIENT)
Dept: CARDIOLOGY CLINIC | Age: 71
End: 2021-09-29

## 2021-09-29 DIAGNOSIS — R00.2 PALPITATIONS: Primary | ICD-10-CM

## 2021-09-30 ENCOUNTER — APPOINTMENT (OUTPATIENT)
Dept: CARDIAC REHAB | Age: 71
End: 2021-09-30
Payer: MEDICARE

## 2021-10-22 DIAGNOSIS — R00.2 PALPITATIONS: ICD-10-CM

## 2021-11-22 DIAGNOSIS — D63.8 ANEMIA IN OTHER CHRONIC DISEASES CLASSIFIED ELSEWHERE: ICD-10-CM

## 2021-11-22 DIAGNOSIS — I10 PRIMARY HYPERTENSION: ICD-10-CM

## 2021-11-22 DIAGNOSIS — E03.9 ACQUIRED HYPOTHYROIDISM: ICD-10-CM

## 2021-11-22 DIAGNOSIS — K92.2 ACUTE GASTROINTESTINAL HEMORRHAGE: ICD-10-CM

## 2021-11-22 PROBLEM — D64.89 OTHER SPECIFIED ANEMIAS: Status: ACTIVE | Noted: 2021-11-22

## 2021-11-22 PROBLEM — N28.9: Status: ACTIVE | Noted: 2021-11-22

## 2021-11-22 LAB
ALBUMIN SERPL-MCNC: 4.4 G/DL (ref 3.5–5.2)
ALP BLD-CCNC: 80 U/L (ref 35–104)
ALT SERPL-CCNC: 11 U/L (ref 0–32)
ANION GAP SERPL CALCULATED.3IONS-SCNC: 17 MMOL/L (ref 7–16)
AST SERPL-CCNC: 17 U/L (ref 0–31)
BASOPHILS ABSOLUTE: 0.03 E9/L (ref 0–0.2)
BASOPHILS RELATIVE PERCENT: 0.4 % (ref 0–2)
BILIRUB SERPL-MCNC: 0.4 MG/DL (ref 0–1.2)
BUN BLDV-MCNC: 19 MG/DL (ref 6–23)
C-REACTIVE PROTEIN: 0.5 MG/DL (ref 0–0.4)
CALCIUM SERPL-MCNC: 9.3 MG/DL (ref 8.6–10.2)
CHLORIDE BLD-SCNC: 104 MMOL/L (ref 98–107)
CHOLESTEROL, TOTAL: 134 MG/DL (ref 0–199)
CO2: 20 MMOL/L (ref 22–29)
CREAT SERPL-MCNC: 0.7 MG/DL (ref 0.5–1)
EOSINOPHILS ABSOLUTE: 0.19 E9/L (ref 0.05–0.5)
EOSINOPHILS RELATIVE PERCENT: 2.8 % (ref 0–6)
FERRITIN: 244 NG/ML
FOLATE: >20 NG/ML (ref 4.8–24.2)
GFR AFRICAN AMERICAN: >60
GFR NON-AFRICAN AMERICAN: >60 ML/MIN/1.73
GLUCOSE BLD-MCNC: 95 MG/DL (ref 74–99)
HCT VFR BLD CALC: 31 % (ref 34–48)
HDLC SERPL-MCNC: 54 MG/DL
HEMOGLOBIN: 9.4 G/DL (ref 11.5–15.5)
IMMATURE GRANULOCYTES #: 0.03 E9/L
IMMATURE GRANULOCYTES %: 0.4 % (ref 0–5)
IMMATURE RETIC FRACT: 16.8 % (ref 3–15.9)
IRON SATURATION: 12 % (ref 15–50)
IRON: 37 MCG/DL (ref 37–145)
LDL CHOLESTEROL CALCULATED: 53 MG/DL (ref 0–99)
LYMPHOCYTES ABSOLUTE: 0.83 E9/L (ref 1.5–4)
LYMPHOCYTES RELATIVE PERCENT: 12.1 % (ref 20–42)
MCH RBC QN AUTO: 26.8 PG (ref 26–35)
MCHC RBC AUTO-ENTMCNC: 30.3 % (ref 32–34.5)
MCV RBC AUTO: 88.3 FL (ref 80–99.9)
MONOCYTES ABSOLUTE: 0.51 E9/L (ref 0.1–0.95)
MONOCYTES RELATIVE PERCENT: 7.4 % (ref 2–12)
NEUTROPHILS ABSOLUTE: 5.28 E9/L (ref 1.8–7.3)
NEUTROPHILS RELATIVE PERCENT: 76.9 % (ref 43–80)
PDW BLD-RTO: 14.8 FL (ref 11.5–15)
PLATELET # BLD: 192 E9/L (ref 130–450)
PMV BLD AUTO: 11.5 FL (ref 7–12)
POTASSIUM SERPL-SCNC: 4.6 MMOL/L (ref 3.5–5)
RBC # BLD: 3.51 E12/L (ref 3.5–5.5)
RETIC HGB EQUIVALENT: 29.7 PG (ref 28.2–36.6)
RETICULOCYTE ABSOLUTE COUNT: 0.07 E12/L
RETICULOCYTE COUNT PCT: 2 % (ref 0.4–1.9)
SEDIMENTATION RATE, ERYTHROCYTE: 60 MM/HR (ref 0–20)
SODIUM BLD-SCNC: 141 MMOL/L (ref 132–146)
TOTAL IRON BINDING CAPACITY: 299 MCG/DL (ref 250–450)
TOTAL PROTEIN: 7.6 G/DL (ref 6.4–8.3)
TRIGL SERPL-MCNC: 133 MG/DL (ref 0–149)
TSH SERPL DL<=0.05 MIU/L-ACNC: 0.77 UIU/ML (ref 0.27–4.2)
VITAMIN B-12: 517 PG/ML (ref 211–946)
VLDLC SERPL CALC-MCNC: 27 MG/DL
WBC # BLD: 6.9 E9/L (ref 4.5–11.5)

## 2021-12-22 DIAGNOSIS — I20.9 NEW-ONSET ANGINA (HCC): ICD-10-CM

## 2021-12-24 RX ORDER — ISOSORBIDE MONONITRATE 30 MG/1
TABLET, EXTENDED RELEASE ORAL
Qty: 30 TABLET | Refills: 0 | Status: SHIPPED
Start: 2021-12-24 | End: 2022-01-05 | Stop reason: SDUPTHER

## 2022-01-05 DIAGNOSIS — I20.9 NEW-ONSET ANGINA (HCC): ICD-10-CM

## 2022-01-05 RX ORDER — ISOSORBIDE MONONITRATE 30 MG/1
30 TABLET, EXTENDED RELEASE ORAL DAILY
Qty: 30 TABLET | Refills: 1 | Status: SHIPPED
Start: 2022-01-05 | End: 2022-03-07

## 2022-02-22 ENCOUNTER — HOSPITAL ENCOUNTER (OUTPATIENT)
Age: 72
Discharge: HOME | End: 2022-02-22
Payer: MEDICARE

## 2022-02-22 LAB
ALBUMIN SERPL-MCNC: 4.6 G/DL (ref 3.5–5.2)
ANION GAP SERPL CALCULATED.3IONS-SCNC: 11 MMOL/L (ref 7–16)
BASOPHILS ABSOLUTE: 0.02 E9/L (ref 0–0.2)
BASOPHILS RELATIVE PERCENT: 0.3 % (ref 0–2)
BUN BLDV-MCNC: 23 MG/DL (ref 6–23)
C-REACTIVE PROTEIN: 0.3 MG/DL (ref 0–0.4)
CALCIUM SERPL-MCNC: 9.5 MG/DL (ref 8.6–10.2)
CHLORIDE BLD-SCNC: 102 MMOL/L (ref 98–107)
CO2: 26 MMOL/L (ref 22–29)
CREAT SERPL-MCNC: 0.8 MG/DL (ref 0.5–1)
EOSINOPHILS ABSOLUTE: 0.18 E9/L (ref 0.05–0.5)
EOSINOPHILS RELATIVE PERCENT: 2.9 % (ref 0–6)
GFR AFRICAN AMERICAN: >60
GFR NON-AFRICAN AMERICAN: >60 ML/MIN/1.73
GLUCOSE BLD-MCNC: 162 MG/DL (ref 74–99)
HCT VFR BLD CALC: 31 % (ref 34–48)
HEMOGLOBIN: 9.6 G/DL (ref 11.5–15.5)
IMMATURE GRANULOCYTES #: 0.04 E9/L
IMMATURE GRANULOCYTES %: 0.6 % (ref 0–5)
LYMPHOCYTES ABSOLUTE: 0.75 E9/L (ref 1.5–4)
LYMPHOCYTES RELATIVE PERCENT: 12 % (ref 20–42)
MCH RBC QN AUTO: 26.2 PG (ref 26–35)
MCHC RBC AUTO-ENTMCNC: 31 % (ref 32–34.5)
MCV RBC AUTO: 84.7 FL (ref 80–99.9)
MONOCYTES ABSOLUTE: 0.47 E9/L (ref 0.1–0.95)
MONOCYTES RELATIVE PERCENT: 7.5 % (ref 2–12)
NEUTROPHILS ABSOLUTE: 4.79 E9/L (ref 1.8–7.3)
NEUTROPHILS RELATIVE PERCENT: 76.7 % (ref 43–80)
PDW BLD-RTO: 14.5 FL (ref 11.5–15)
PHOSPHORUS: 3.7 MG/DL (ref 2.5–4.5)
PLATELET # BLD: 200 E9/L (ref 130–450)
PMV BLD AUTO: 10.8 FL (ref 7–12)
POTASSIUM SERPL-SCNC: 4.3 MMOL/L (ref 3.5–5)
RBC # BLD: 3.66 E12/L (ref 3.5–5.5)
SEDIMENTATION RATE, ERYTHROCYTE: 40 MM/HR (ref 0–20)
SODIUM BLD-SCNC: 139 MMOL/L (ref 132–146)
WBC # BLD: 6.3 E9/L (ref 4.5–11.5)

## 2022-02-22 PROCEDURE — 85025 COMPLETE CBC W/AUTO DIFF WBC: CPT

## 2022-02-22 PROCEDURE — 86140 C-REACTIVE PROTEIN: CPT

## 2022-02-22 PROCEDURE — 85651 RBC SED RATE NONAUTOMATED: CPT

## 2022-02-22 PROCEDURE — 80069 RENAL FUNCTION PANEL: CPT

## 2022-02-22 PROCEDURE — 36415 COLL VENOUS BLD VENIPUNCTURE: CPT

## 2022-02-28 DIAGNOSIS — I10 PRIMARY HYPERTENSION: ICD-10-CM

## 2022-02-28 DIAGNOSIS — E03.9 ACQUIRED HYPOTHYROIDISM: ICD-10-CM

## 2022-02-28 PROBLEM — T82.837A RETROPERITONEAL HEMORRHAGE COMPLICATING CARDIAC CATHETERIZATION: Status: RESOLVED | Noted: 2021-04-08 | Resolved: 2022-02-28

## 2022-02-28 PROBLEM — Z12.31 SCREENING MAMMOGRAM FOR HIGH-RISK PATIENT: Status: ACTIVE | Noted: 2022-02-28

## 2022-02-28 PROBLEM — D64.89 OTHER SPECIFIED ANEMIAS: Status: RESOLVED | Noted: 2021-11-22 | Resolved: 2022-02-28

## 2022-02-28 LAB
ALBUMIN SERPL-MCNC: 4.3 G/DL (ref 3.5–5.2)
ALP BLD-CCNC: 82 U/L (ref 35–104)
ALT SERPL-CCNC: 11 U/L (ref 0–32)
ANION GAP SERPL CALCULATED.3IONS-SCNC: 13 MMOL/L (ref 7–16)
AST SERPL-CCNC: 18 U/L (ref 0–31)
BASOPHILS ABSOLUTE: 0.02 E9/L (ref 0–0.2)
BASOPHILS RELATIVE PERCENT: 0.3 % (ref 0–2)
BILIRUB SERPL-MCNC: 0.4 MG/DL (ref 0–1.2)
BUN BLDV-MCNC: 21 MG/DL (ref 6–23)
CALCIUM SERPL-MCNC: 9.3 MG/DL (ref 8.6–10.2)
CHLORIDE BLD-SCNC: 104 MMOL/L (ref 98–107)
CHOLESTEROL, TOTAL: 133 MG/DL (ref 0–199)
CO2: 23 MMOL/L (ref 22–29)
CREAT SERPL-MCNC: 0.7 MG/DL (ref 0.5–1)
EOSINOPHILS ABSOLUTE: 0.16 E9/L (ref 0.05–0.5)
EOSINOPHILS RELATIVE PERCENT: 2.5 % (ref 0–6)
GFR AFRICAN AMERICAN: >60
GFR NON-AFRICAN AMERICAN: >60 ML/MIN/1.73
GLUCOSE BLD-MCNC: 116 MG/DL (ref 74–99)
HCT VFR BLD CALC: 31.3 % (ref 34–48)
HDLC SERPL-MCNC: 49 MG/DL
HEMOGLOBIN: 9.2 G/DL (ref 11.5–15.5)
IMMATURE GRANULOCYTES #: 0.04 E9/L
IMMATURE GRANULOCYTES %: 0.6 % (ref 0–5)
LDL CHOLESTEROL CALCULATED: 58 MG/DL (ref 0–99)
LYMPHOCYTES ABSOLUTE: 0.71 E9/L (ref 1.5–4)
LYMPHOCYTES RELATIVE PERCENT: 11.1 % (ref 20–42)
MCH RBC QN AUTO: 25.6 PG (ref 26–35)
MCHC RBC AUTO-ENTMCNC: 29.4 % (ref 32–34.5)
MCV RBC AUTO: 87.2 FL (ref 80–99.9)
MONOCYTES ABSOLUTE: 0.49 E9/L (ref 0.1–0.95)
MONOCYTES RELATIVE PERCENT: 7.7 % (ref 2–12)
NEUTROPHILS ABSOLUTE: 4.95 E9/L (ref 1.8–7.3)
NEUTROPHILS RELATIVE PERCENT: 77.8 % (ref 43–80)
PDW BLD-RTO: 14.6 FL (ref 11.5–15)
PLATELET # BLD: 191 E9/L (ref 130–450)
PMV BLD AUTO: 11.6 FL (ref 7–12)
POTASSIUM SERPL-SCNC: 4.5 MMOL/L (ref 3.5–5)
RBC # BLD: 3.59 E12/L (ref 3.5–5.5)
SODIUM BLD-SCNC: 140 MMOL/L (ref 132–146)
TOTAL PROTEIN: 7.9 G/DL (ref 6.4–8.3)
TRIGL SERPL-MCNC: 130 MG/DL (ref 0–149)
TSH SERPL DL<=0.05 MIU/L-ACNC: 1.52 UIU/ML (ref 0.27–4.2)
VLDLC SERPL CALC-MCNC: 26 MG/DL
WBC # BLD: 6.4 E9/L (ref 4.5–11.5)

## 2022-03-07 DIAGNOSIS — I20.9 NEW-ONSET ANGINA (HCC): ICD-10-CM

## 2022-03-09 ENCOUNTER — TELEPHONE (OUTPATIENT)
Dept: CARDIOLOGY CLINIC | Age: 72
End: 2022-03-09

## 2022-03-09 NOTE — TELEPHONE ENCOUNTER
Patient had stent put in last April. Was put on Brilinta. Wants to know if she has to come in to go off the Brilinta or if she can just stop it. States she is she gets short of breath with it and would like to stop. Made her an appointment for 4/12/2022.

## 2022-03-13 RX ORDER — ISOSORBIDE MONONITRATE 30 MG/1
30 TABLET, EXTENDED RELEASE ORAL DAILY
Qty: 90 TABLET | Refills: 3 | Status: SHIPPED | OUTPATIENT
Start: 2022-03-13

## 2022-03-23 ENCOUNTER — HOSPITAL ENCOUNTER (OUTPATIENT)
Dept: GENERAL RADIOLOGY | Age: 72
Discharge: HOME | End: 2022-03-25
Payer: MEDICARE

## 2022-03-23 DIAGNOSIS — Z12.31 SCREENING MAMMOGRAM FOR HIGH-RISK PATIENT: ICD-10-CM

## 2022-03-23 PROCEDURE — 77063 BREAST TOMOSYNTHESIS BI: CPT

## 2022-04-12 ENCOUNTER — OFFICE VISIT (OUTPATIENT)
Dept: CARDIOLOGY CLINIC | Age: 72
End: 2022-04-12
Payer: MEDICARE

## 2022-04-12 VITALS
RESPIRATION RATE: 18 BRPM | DIASTOLIC BLOOD PRESSURE: 74 MMHG | OXYGEN SATURATION: 96 % | HEIGHT: 59 IN | WEIGHT: 196.2 LBS | HEART RATE: 71 BPM | BODY MASS INDEX: 39.55 KG/M2 | SYSTOLIC BLOOD PRESSURE: 130 MMHG

## 2022-04-12 DIAGNOSIS — I25.10 CORONARY ARTERY DISEASE INVOLVING NATIVE CORONARY ARTERY OF NATIVE HEART WITHOUT ANGINA PECTORIS: Primary | ICD-10-CM

## 2022-04-12 PROCEDURE — 1123F ACP DISCUSS/DSCN MKR DOCD: CPT | Performed by: INTERNAL MEDICINE

## 2022-04-12 PROCEDURE — 93000 ELECTROCARDIOGRAM COMPLETE: CPT | Performed by: INTERNAL MEDICINE

## 2022-04-12 PROCEDURE — G8427 DOCREV CUR MEDS BY ELIG CLIN: HCPCS | Performed by: INTERNAL MEDICINE

## 2022-04-12 PROCEDURE — 3017F COLORECTAL CA SCREEN DOC REV: CPT | Performed by: INTERNAL MEDICINE

## 2022-04-12 PROCEDURE — G8400 PT W/DXA NO RESULTS DOC: HCPCS | Performed by: INTERNAL MEDICINE

## 2022-04-12 PROCEDURE — 1036F TOBACCO NON-USER: CPT | Performed by: INTERNAL MEDICINE

## 2022-04-12 PROCEDURE — 99213 OFFICE O/P EST LOW 20 MIN: CPT | Performed by: INTERNAL MEDICINE

## 2022-04-12 PROCEDURE — 1090F PRES/ABSN URINE INCON ASSESS: CPT | Performed by: INTERNAL MEDICINE

## 2022-04-12 PROCEDURE — 4040F PNEUMOC VAC/ADMIN/RCVD: CPT | Performed by: INTERNAL MEDICINE

## 2022-04-12 PROCEDURE — G8417 CALC BMI ABV UP PARAM F/U: HCPCS | Performed by: INTERNAL MEDICINE

## 2022-04-12 ASSESSMENT — ENCOUNTER SYMPTOMS
CONSTIPATION: 0
BLOOD IN STOOL: 0
SHORTNESS OF BREATH: 1
ABDOMINAL PAIN: 0
WHEEZING: 0
NAUSEA: 0
BACK PAIN: 0
DIARRHEA: 0
VOMITING: 0
COUGH: 0

## 2022-04-12 NOTE — PROGRESS NOTES
OUTPATIENT CARDIOLOGY FOLLOW-UP    HPI:    Name: Radu Jackson    Age: 80 y.o. Primary Care Physician: Madeline Painting DO    Date of Service: 4/12/2022    Chief Complaint:   Chief Complaint   Patient presents with    Coronary Artery Disease     9 mo OV.  Hypertension    Hyperlipidemia        History of present illness :   69-year-old obese female who comes today for 1 year follow-up. Patient underwent successful PCI of her proximal to mid RCA on 4/19/2021. She has history of CAD, cardiomyopathy with ejection fraction of 45-50%, hypertension, hyperlipidemia, PFO, right axillary stenosis secondary to thoracic outlet syndrome, hypothyroidism, and iatrogenic retroperitoneal bleed during left heart catheterization via the right femoral artery access. Patient was seen by Dr. Emily Nieves on 7/12/2021 for evaluation of PFO closure. PFO closure was recommended if developing recurrent TIA or stroke without alternative etiology of CVA. Patient has been active during the wintertime. She has lost 15 pounds. She is able to do her housework to go up 1 flight of stairs with no chest discomfort but has been complaining of chronic dyspnea on exertion. She feels occasional lightheadedness but denies syncope. She denies palpitations, orthopnea, PND or lower extremity edema. EKG done today revealed sinus rhythm at 71 bpm, incomplete right bundle branch block with nonspecific T wave changes and left ventricular hypertrophy with strain. Review of Systems:   Review of Systems   Constitutional: Negative for chills, fatigue and fever. HENT: Negative for nosebleeds. Respiratory: Positive for shortness of breath. Negative for cough and wheezing. She snores at night but denies gasping for air at night. Gastrointestinal: Negative for abdominal pain, blood in stool, constipation, diarrhea, nausea and vomiting. Genitourinary: Negative for dysuria and hematuria.    Musculoskeletal: Negative for back pain, joint swelling and myalgias. Neurological: Positive for light-headedness. Negative for syncope. Psychiatric/Behavioral: The patient is not nervous/anxious. Past Medical History:  Past Medical History:   Diagnosis Date    Arthritis     CAD (coronary artery disease)     Cerebrovascular disease     Chronic back pain     Congenital heart disease     CVA (cerebral vascular accident) ([facility] [address] 2/1236    Embolic. ? tia?  Depression     Fracture of right shoulder 1/2011    Frequent UTI     GERD (gastroesophageal reflux disease)     H/O ligation of vein 1974 Bilaterally.  Hiatal hernia 2000    History of blood transfusion     History of thoracic outlet syndrome     Hx of blood clots     Hypercholesterolemia     Hypertension     Hypothyroidism     Kidney stones     Nephrolithiasis     Bilaterally.  Nonischemic cardiomyopathy ([facility] [address] 6/25/2018    ROSALVA (obstructive sleep apnea) 04/16/2018    mild    Osteoarthritis     PFO (patent foramen ovale)     Pneumonia 1989    Thyroid disease     Hypothyroidism. Past Surgical History:  Past Surgical History:   Procedure Laterality Date    BRONCHOSCOPY N/A 8/21/2020    BRONCHOSCOPY performed by Nessa Brown MD at [address] [place] [address]  03/05/2018    No significant CAD  (Dr Reyna Curling)    COLONOSCOPY  11/04    Negative.  COLONOSCOPY  09/17/2013    COLONOSCOPY  12/30/2016    Sigmoid Colitis, Diverticulitis    CORONARY ANGIOPLASTY  04/19/2021    stent x 1 to the RCA    CORONARY ANGIOPLASTY WITH STENT PLACEMENT  04/19/2021    Dr. Mcnamara Shells- Resolute Paia DALIA 2.5x22 Mid RCA    DIAGNOSTIC CARDIAC CATH LAB PROCEDURE  5/17/1988    [facility], patent coronaries.  DIAGNOSTIC CARDIAC CATH LAB PROCEDURE  6/10/1992    [facility], patent coronaries.  DIAGNOSTIC CARDIAC CATH LAB PROCEDURE  10/26/2001    [facility], Normal cath, right femoral angiography and Perclose.  DIAGNOSTIC CARDIAC CATH LAB PROCEDURE  12/7/2005    [facility].     HYSTERECTOMY      LITHOTRIPSY      MIDDLE EAR SURGERY      Eardrum replaced in right ear.  TONSILLECTOMY      URETHRA SURGERY      VASCULAR SURGERY      veil ligation kylee legs       Family History:  Family History   Problem Relation Age of Onset    Coronary Art Dis Mother         s/p CABG    Alzheimer's Disease Mother     Colon Cancer Father     Cancer Father     Hypertension Sister     High Cholesterol Sister     Hypertension Sister     High Cholesterol Sister     Heart Disease Brother 72        ACB    Uterine Cancer Maternal Grandmother        Social History:  Social History     Socioeconomic History    Marital status:      Spouse name: Not on file    Number of children: Not on file    Years of education: Not on file    Highest education level: Not on file   Occupational History    Occupation:    Tobacco Use    Smoking status: Former Smoker     Packs/day: 3.00     Years: 20.00     Pack years: 60.00     Types: Cigarettes     Start date: 66     Quit date: 1999     Years since quittin.2    Smokeless tobacco: Never Used    Tobacco comment: smoked 2-3 PPD before quittting   Vaping Use    Vaping Use: Never used   Substance and Sexual Activity    Alcohol use: Not Currently    Drug use: Never    Sexual activity: Not Currently   Other Topics Concern    Not on file   Social History Narrative    Denies caffeine. Social Determinants of Health     Financial Resource Strain: Low Risk     Difficulty of Paying Living Expenses: Not very hard   Food Insecurity: No Food Insecurity    Worried About Running Out of Food in the Last Year: Never true    Issa of Food in the Last Year: Never true   Transportation Needs:     Lack of Transportation (Medical): Not on file    Lack of Transportation (Non-Medical):  Not on file   Physical Activity:     Days of Exercise per Week: Not on file    Minutes of Exercise per Session: Not on file   Stress:     Feeling of Stress : Not on file   Social Connections:     Frequency of Communication with Friends and Family: Not on file    Frequency of Social Gatherings with Friends and Family: Not on file    Attends [employer] Services: Not on file    Active Member of Clubs or Organizations: Not on file    Attends Club or Organization Meetings: Not on file    Marital Status: Not on file   Intimate Partner Violence:     Fear of Current or Ex-Partner: Not on file    Emotionally Abused: Not on file    Physically Abused: Not on file    Sexually Abused: Not on file   Housing Stability:     Unable to Pay for Housing in the Last Year: Not on file    Number of [place] in the Last Year: Not on file    Unstable Housing in the Last Year: Not on file       Allergies: Allergies   Allergen Reactions    Biaxin [Clarithromycin] Nausea And Vomiting and Other (See Comments)     Abdominal pain    Codeine Nausea And Vomiting    Neosporin [Bacitracin-Neomycin-Polymyxin] Swelling    Sulfa Antibiotics Itching and Nausea And Vomiting     decrease in Blood pressure.  Clarithromycin Other (See Comments)    Neomycin-Polymyxin-Gramicidin Swelling     redness    Septra [Sulfamethoxazole-Trimethoprim]     Vioxx [Rofecoxib]     Sulfasalazine Nausea And Vomiting     severe headaches.        Current Medications:  Current Outpatient Medications   Medication Sig Dispense Refill    sucralfate (CARAFATE) 1 GM tablet Take 1 tablet by mouth 2 times daily 180 tablet 1    carvedilol (COREG) 3.125 MG tablet Take 1 tablet by mouth 2 times daily (with meals) 180 tablet 1    isosorbide mononitrate (IMDUR) 30 MG extended release tablet TAKE 1 TABLET BY MOUTH DAILY 90 tablet 3    rosuvastatin (CRESTOR) 20 MG tablet Take 1 tablet by mouth nightly 90 tablet 1    sertraline (ZOLOFT) 50 MG tablet Take 1 tablet by mouth nightly 90 tablet 1    levothyroxine (SYNTHROID) 75 MCG tablet Take 1 tablet by mouth Daily 30 tablet 3    omeprazole (PRILOSEC) 20 MG delayed release capsule Take 1 capsule by mouth 2 times daily 180 capsule 1    aspirin 81 MG EC tablet Take 1 tablet by mouth daily 30 tablet 3    ticagrelor (BRILINTA) 90 MG TABS tablet Take 1 tablet by mouth 2 times daily 60 tablet 5    nitroGLYCERIN (NITROSTAT) 0.4 MG SL tablet Place 1 tablet under the tongue every 5 minutes as needed for Chest pain (Patient not taking: Reported on 11/22/2021) 25 tablet 3    fluticasone (FLONASE) 50 MCG/ACT nasal spray 2 sprays by Nasal route daily      mesalamine (LIALDA) 1.2 g EC tablet Take 1,200 mg by mouth daily (with breakfast)       ascorbic acid (VITAMIN C) 500 MG tablet Take 500 mg by mouth daily.  Multiple Vitamins-Minerals (CENTRUM) TABS Take 1 tablet by mouth daily        No current facility-administered medications for this visit. Physical Exam:   4' 11\" (1.499 m)   BMI 38.98 kg/m²   Wt Readings from Last 3 Encounters:   02/28/22 193 lb (87.5 kg)   11/22/21 188 lb (85.3 kg)   11/22/21 188 lb (85.3 kg)       Physical Exam  Constitutional:       General: She is not in acute distress. Appearance: She is well-developed. She is obese. HENT:      Head: Normocephalic and atraumatic. Neck:      Vascular: No carotid bruit or JVD. Cardiovascular:      Rate and Rhythm: Normal rate and regular rhythm. Heart sounds: No murmur heard. No friction rub. No gallop. Pulmonary:      Breath sounds: Normal breath sounds. No wheezing or rales. Chest:      Chest wall: No tenderness. Abdominal:      General: Bowel sounds are normal. There is no distension. Palpations: Abdomen is soft. There is no mass. Tenderness: There is no abdominal tenderness. Comments: No abdominal bruit. Musculoskeletal:      Cervical back: Neck supple. Right lower leg: No edema. Left lower leg: No edema. Skin:     General: Skin is warm and dry. Neurological:      Mental Status: She is alert and oriented to person, place, and time. Laboratory Tests:  Lab Results   Component Value Date    CREATININE 0.7 02/28/2022    BUN 21 02/28/2022     02/28/2022    K 4.5 02/28/2022     02/28/2022    CO2 23 02/28/2022       Lab Results   Component Value Date    WBC 6.4 02/28/2022    HGB 9.2 (L) 02/28/2022    HCT 31.3 (L) 02/28/2022    MCV 87.2 02/28/2022     02/28/2022     Lab Results   Component Value Date    ALT 11 02/28/2022    AST 18 02/28/2022    ALKPHOS 82 02/28/2022    BILITOT 0.4 02/28/2022     Lab Results   Component Value Date    CKTOTAL 38 04/23/2011    TROPONINI 0.04 (H) 04/02/2021    TROPONINI 0.03 04/02/2021    TROPONINI <0.01 08/20/2020     Lab Results   Component Value Date    INR 1.1 04/17/2021    INR 1.0 04/02/2021    INR 1.0 06/18/2020    PROTIME 12.3 04/17/2021    PROTIME 10.9 04/02/2021    PROTIME 11.1 06/18/2020     Lab Results   Component Value Date    TSH 1.520 02/28/2022       Lab Results   Component Value Date    CHOL 133 02/28/2022    CHOL 134 11/22/2021    CHOL 108 08/23/2021     Lab Results   Component Value Date    TRIG 130 02/28/2022    TRIG 133 11/22/2021    TRIG 110 08/23/2021     Lab Results   Component Value Date    HDL 49 02/28/2022    HDL 54 11/22/2021    HDL 47 08/23/2021     Lab Results   Component Value Date    LDLCALC 58 02/28/2022    LDLCALC 53 11/22/2021    LDLCALC 39 08/23/2021     Lab Results   Component Value Date    LABVLDL 26 02/28/2022    LABVLDL 27 11/22/2021    LABVLDL 22 08/23/2021       Cardiac Tests:  ZIO XT monitor done between 9/29/21 and 10/13/2021:  Sinus rhythm with average heart rate 76 bpm.  Less than 1% SVE's and less than 1% VE's. Echocardiogram done on 4/8/2021:  Technically limited study. Definity study was done. Mildly dilated left atrium. Mildly dilated left ventricle with mild systolic dysfunction. Absence of significant valvular heart disease. Grade 1 diastolic dysfunction. Probable ischemic cardiomyopathy with an EF 45-50%.     ASSESSMENT / PLAN:  -CAD:  Stable with no angina. She has completed 1 year of DAPT after her RCA stent. .    -Status post right common femoral artery iatrogenic injury complicated by retroperitoneal bleed.    -Chronic dyspnea on exertion: Probably multifactorial in origin including probable diastolic dysfunction due to hypertension, mild systolic dysfunction, anemia, obesity and deconditioning.  -Anemia.  -Hypertension: Controlled. -Hyperlipidemia: On Crestor. -PFO: Was evaluated by Dr. Indra Menchaca. PFO closure only if no obvious source of recurrent TIA or stroke. -Right axillary artery stenosis secondary to thoracic outlet syndrome.  -Hypothyroidism.  -Obesity. I will discontinue her Brilinta. We will continue her other cardiac medications. Patient was advised to remain hydrated. Patient was advised to have low-salt diet and to lose weight. Patient was advised to record her blood pressure if feeling lightheaded. Patient was advised become more active. We will follow-up at the office in 1 year. The patient's current medication list, allergies, problem list and results of all previously ordered testing were reviewed at today's visit. {  Jacqueline Galeana MD , [facility].   [facility]) Cardiology

## 2022-05-17 ENCOUNTER — HOSPITAL ENCOUNTER (OUTPATIENT)
Age: 72
Discharge: HOME | End: 2022-05-17
Payer: MEDICARE

## 2022-05-17 LAB
ALBUMIN SERPL-MCNC: 4.7 G/DL (ref 3.5–5.2)
ANION GAP SERPL CALCULATED.3IONS-SCNC: 19 MMOL/L (ref 7–16)
BASOPHILS ABSOLUTE: 0.03 E9/L (ref 0–0.2)
BASOPHILS RELATIVE PERCENT: 0.5 % (ref 0–2)
BUN BLDV-MCNC: 21 MG/DL (ref 6–23)
C-REACTIVE PROTEIN: 0.7 MG/DL (ref 0–0.4)
CALCIUM SERPL-MCNC: 9.5 MG/DL (ref 8.6–10.2)
CHLORIDE BLD-SCNC: 101 MMOL/L (ref 98–107)
CO2: 22 MMOL/L (ref 22–29)
CREAT SERPL-MCNC: 0.8 MG/DL (ref 0.5–1)
EOSINOPHILS ABSOLUTE: 0.22 E9/L (ref 0.05–0.5)
EOSINOPHILS RELATIVE PERCENT: 3.9 % (ref 0–6)
GFR AFRICAN AMERICAN: >60
GFR NON-AFRICAN AMERICAN: >60 ML/MIN/1.73
GLUCOSE BLD-MCNC: 213 MG/DL (ref 74–99)
HCT VFR BLD CALC: 32 % (ref 34–48)
HEMOGLOBIN: 9.8 G/DL (ref 11.5–15.5)
IMMATURE GRANULOCYTES #: 0.03 E9/L
IMMATURE GRANULOCYTES %: 0.5 % (ref 0–5)
LYMPHOCYTES ABSOLUTE: 0.7 E9/L (ref 1.5–4)
LYMPHOCYTES RELATIVE PERCENT: 12.3 % (ref 20–42)
MCH RBC QN AUTO: 26 PG (ref 26–35)
MCHC RBC AUTO-ENTMCNC: 30.6 % (ref 32–34.5)
MCV RBC AUTO: 84.9 FL (ref 80–99.9)
MONOCYTES ABSOLUTE: 0.33 E9/L (ref 0.1–0.95)
MONOCYTES RELATIVE PERCENT: 5.8 % (ref 2–12)
NEUTROPHILS ABSOLUTE: 4.38 E9/L (ref 1.8–7.3)
NEUTROPHILS RELATIVE PERCENT: 77 % (ref 43–80)
PDW BLD-RTO: 14.3 FL (ref 11.5–15)
PHOSPHORUS: 3.6 MG/DL (ref 2.5–4.5)
PLATELET # BLD: 180 E9/L (ref 130–450)
PMV BLD AUTO: 11.1 FL (ref 7–12)
POTASSIUM SERPL-SCNC: 4.2 MMOL/L (ref 3.5–5)
RBC # BLD: 3.77 E12/L (ref 3.5–5.5)
SEDIMENTATION RATE, ERYTHROCYTE: 90 MM/HR (ref 0–20)
SODIUM BLD-SCNC: 142 MMOL/L (ref 132–146)
WBC # BLD: 5.7 E9/L (ref 4.5–11.5)

## 2022-05-17 PROCEDURE — 36415 COLL VENOUS BLD VENIPUNCTURE: CPT

## 2022-05-17 PROCEDURE — 85651 RBC SED RATE NONAUTOMATED: CPT

## 2022-05-17 PROCEDURE — 80069 RENAL FUNCTION PANEL: CPT

## 2022-05-17 PROCEDURE — 86140 C-REACTIVE PROTEIN: CPT

## 2022-05-17 PROCEDURE — 85025 COMPLETE CBC W/AUTO DIFF WBC: CPT

## 2022-06-02 ENCOUNTER — HOSPITAL ENCOUNTER (OUTPATIENT)
Dept: ULTRASOUND IMAGING | Age: 72
Discharge: HOME | End: 2022-06-04
Payer: MEDICARE

## 2022-06-02 DIAGNOSIS — K75.81 NONALCOHOLIC STEATOHEPATITIS: ICD-10-CM

## 2022-06-02 PROCEDURE — 76705 ECHO EXAM OF ABDOMEN: CPT

## 2022-08-22 DIAGNOSIS — I25.10 CORONARY ARTERY DISEASE INVOLVING NATIVE HEART WITHOUT ANGINA PECTORIS, UNSPECIFIED VESSEL OR LESION TYPE: ICD-10-CM

## 2022-08-22 RX ORDER — NITROGLYCERIN 0.4 MG/1
0.4 TABLET SUBLINGUAL EVERY 5 MIN PRN
Qty: 25 TABLET | Refills: 3 | Status: SHIPPED | OUTPATIENT
Start: 2022-08-22

## 2022-09-06 ENCOUNTER — HOSPITAL ENCOUNTER (OUTPATIENT)
Age: 72
Discharge: HOME | End: 2022-09-06
Payer: MEDICARE

## 2022-09-06 LAB
ALBUMIN SERPL-MCNC: 4.1 G/DL (ref 3.5–5.2)
ANION GAP SERPL CALCULATED.3IONS-SCNC: 14 MMOL/L (ref 7–16)
BASOPHILS ABSOLUTE: 0.03 E9/L (ref 0–0.2)
BASOPHILS RELATIVE PERCENT: 0.5 % (ref 0–2)
BUN BLDV-MCNC: 15 MG/DL (ref 6–23)
C-REACTIVE PROTEIN: 0.6 MG/DL (ref 0–0.4)
CALCIUM SERPL-MCNC: 9.4 MG/DL (ref 8.6–10.2)
CHLORIDE BLD-SCNC: 101 MMOL/L (ref 98–107)
CO2: 25 MMOL/L (ref 22–29)
CREAT SERPL-MCNC: 0.7 MG/DL (ref 0.5–1)
EOSINOPHILS ABSOLUTE: 0.21 E9/L (ref 0.05–0.5)
EOSINOPHILS RELATIVE PERCENT: 3.3 % (ref 0–6)
GFR AFRICAN AMERICAN: >60
GFR NON-AFRICAN AMERICAN: >60 ML/MIN/1.73
GLUCOSE BLD-MCNC: 94 MG/DL (ref 74–99)
HCT VFR BLD CALC: 33.9 % (ref 34–48)
HEMOGLOBIN: 10.5 G/DL (ref 11.5–15.5)
IMMATURE GRANULOCYTES #: 0.04 E9/L
IMMATURE GRANULOCYTES %: 0.6 % (ref 0–5)
LYMPHOCYTES ABSOLUTE: 0.97 E9/L (ref 1.5–4)
LYMPHOCYTES RELATIVE PERCENT: 15.2 % (ref 20–42)
MCH RBC QN AUTO: 26.4 PG (ref 26–35)
MCHC RBC AUTO-ENTMCNC: 31 % (ref 32–34.5)
MCV RBC AUTO: 85.2 FL (ref 80–99.9)
MONOCYTES ABSOLUTE: 0.56 E9/L (ref 0.1–0.95)
MONOCYTES RELATIVE PERCENT: 8.8 % (ref 2–12)
NEUTROPHILS ABSOLUTE: 4.59 E9/L (ref 1.8–7.3)
NEUTROPHILS RELATIVE PERCENT: 71.6 % (ref 43–80)
PDW BLD-RTO: 14.4 FL (ref 11.5–15)
PHOSPHORUS: 3.6 MG/DL (ref 2.5–4.5)
PLATELET # BLD: 176 E9/L (ref 130–450)
PMV BLD AUTO: 11.7 FL (ref 7–12)
POTASSIUM SERPL-SCNC: 4.5 MMOL/L (ref 3.5–5)
RBC # BLD: 3.98 E12/L (ref 3.5–5.5)
SEDIMENTATION RATE, ERYTHROCYTE: 36 MM/HR (ref 0–20)
SODIUM BLD-SCNC: 140 MMOL/L (ref 132–146)
WBC # BLD: 6.4 E9/L (ref 4.5–11.5)

## 2022-09-06 PROCEDURE — 86140 C-REACTIVE PROTEIN: CPT

## 2022-09-06 PROCEDURE — 85651 RBC SED RATE NONAUTOMATED: CPT

## 2022-09-06 PROCEDURE — 85025 COMPLETE CBC W/AUTO DIFF WBC: CPT

## 2022-09-06 PROCEDURE — 80069 RENAL FUNCTION PANEL: CPT

## 2022-11-04 ENCOUNTER — HOSPITAL ENCOUNTER (OUTPATIENT)
Age: 72
Discharge: HOME | End: 2022-11-04
Payer: MEDICARE

## 2022-11-04 LAB
ALBUMIN SERPL-MCNC: 4.5 G/DL (ref 3.5–5.2)
ANION GAP SERPL CALCULATED.3IONS-SCNC: 11 MMOL/L (ref 7–16)
BASOPHILS ABSOLUTE: 0.03 E9/L (ref 0–0.2)
BASOPHILS RELATIVE PERCENT: 0.5 % (ref 0–2)
BUN BLDV-MCNC: 15 MG/DL (ref 6–23)
C-REACTIVE PROTEIN: 0.5 MG/DL (ref 0–0.4)
CALCIUM SERPL-MCNC: 9.2 MG/DL (ref 8.6–10.2)
CHLORIDE BLD-SCNC: 102 MMOL/L (ref 98–107)
CO2: 27 MMOL/L (ref 22–29)
CREAT SERPL-MCNC: 0.8 MG/DL (ref 0.5–1)
EOSINOPHILS ABSOLUTE: 0.16 E9/L (ref 0.05–0.5)
EOSINOPHILS RELATIVE PERCENT: 2.4 % (ref 0–6)
GFR SERPL CREATININE-BSD FRML MDRD: >60 ML/MIN/1.73
GLUCOSE BLD-MCNC: 92 MG/DL (ref 74–99)
HCT VFR BLD CALC: 32.3 % (ref 34–48)
HEMOGLOBIN: 10.1 G/DL (ref 11.5–15.5)
IMMATURE GRANULOCYTES #: 0.04 E9/L
IMMATURE GRANULOCYTES %: 0.6 % (ref 0–5)
LYMPHOCYTES ABSOLUTE: 0.96 E9/L (ref 1.5–4)
LYMPHOCYTES RELATIVE PERCENT: 14.6 % (ref 20–42)
MCH RBC QN AUTO: 26.4 PG (ref 26–35)
MCHC RBC AUTO-ENTMCNC: 31.3 % (ref 32–34.5)
MCV RBC AUTO: 84.3 FL (ref 80–99.9)
MONOCYTES ABSOLUTE: 0.53 E9/L (ref 0.1–0.95)
MONOCYTES RELATIVE PERCENT: 8 % (ref 2–12)
NEUTROPHILS ABSOLUTE: 4.87 E9/L (ref 1.8–7.3)
NEUTROPHILS RELATIVE PERCENT: 73.9 % (ref 43–80)
PDW BLD-RTO: 14.3 FL (ref 11.5–15)
PHOSPHORUS: 3.3 MG/DL (ref 2.5–4.5)
PLATELET # BLD: 194 E9/L (ref 130–450)
PMV BLD AUTO: 11 FL (ref 7–12)
POTASSIUM SERPL-SCNC: 4.5 MMOL/L (ref 3.5–5)
RBC # BLD: 3.83 E12/L (ref 3.5–5.5)
SEDIMENTATION RATE, ERYTHROCYTE: 46 MM/HR (ref 0–20)
SODIUM BLD-SCNC: 140 MMOL/L (ref 132–146)
WBC # BLD: 6.6 E9/L (ref 4.5–11.5)

## 2022-11-04 PROCEDURE — 85025 COMPLETE CBC W/AUTO DIFF WBC: CPT

## 2022-11-04 PROCEDURE — 36415 COLL VENOUS BLD VENIPUNCTURE: CPT

## 2022-11-04 PROCEDURE — 80069 RENAL FUNCTION PANEL: CPT

## 2022-11-04 PROCEDURE — 85651 RBC SED RATE NONAUTOMATED: CPT

## 2022-11-04 PROCEDURE — 86140 C-REACTIVE PROTEIN: CPT

## 2022-11-09 DIAGNOSIS — I25.10 CORONARY ARTERY DISEASE INVOLVING NATIVE HEART WITHOUT ANGINA PECTORIS, UNSPECIFIED VESSEL OR LESION TYPE: ICD-10-CM

## 2022-11-09 RX ORDER — CARVEDILOL 3.12 MG/1
3.12 TABLET ORAL 2 TIMES DAILY WITH MEALS
Qty: 180 TABLET | Refills: 1 | Status: SHIPPED | OUTPATIENT
Start: 2022-11-09 | End: 2023-05-08

## 2023-01-21 ENCOUNTER — HOSPITAL ENCOUNTER (OUTPATIENT)
Dept: CT IMAGING | Age: 73
End: 2023-01-21
Payer: MEDICARE

## 2023-01-21 DIAGNOSIS — R91.1 PULMONARY NODULE: ICD-10-CM

## 2023-01-21 PROCEDURE — 71270 CT THORAX DX C-/C+: CPT

## 2023-01-21 PROCEDURE — 6360000004 HC RX CONTRAST MEDICATION: Performed by: RADIOLOGY

## 2023-01-21 RX ADMIN — IOPAMIDOL 75 ML: 755 INJECTION, SOLUTION INTRAVENOUS at 08:31

## 2023-01-27 ENCOUNTER — HOSPITAL ENCOUNTER (OUTPATIENT)
Age: 73
Discharge: HOME | End: 2023-01-27
Payer: MEDICARE

## 2023-01-27 LAB
ALBUMIN SERPL-MCNC: 4.3 G/DL (ref 3.5–5.2)
ALP BLD-CCNC: 76 U/L (ref 35–104)
ALT SERPL-CCNC: 12 U/L (ref 0–32)
AST SERPL-CCNC: 15 U/L (ref 0–31)
BASOPHILS ABSOLUTE: 0.03 E9/L (ref 0–0.2)
BASOPHILS RELATIVE PERCENT: 0.4 % (ref 0–2)
BILIRUB SERPL-MCNC: 0.3 MG/DL (ref 0–1.2)
BILIRUBIN DIRECT: <0.2 MG/DL (ref 0–0.3)
BILIRUBIN, INDIRECT: NORMAL MG/DL (ref 0–1)
C-REACTIVE PROTEIN: <0.3 MG/DL (ref 0–0.4)
EOSINOPHILS ABSOLUTE: 0.22 E9/L (ref 0.05–0.5)
EOSINOPHILS RELATIVE PERCENT: 3.2 % (ref 0–6)
HCT VFR BLD CALC: 32.4 % (ref 34–48)
HEMOGLOBIN: 10.2 G/DL (ref 11.5–15.5)
IMMATURE GRANULOCYTES #: 0.02 E9/L
IMMATURE GRANULOCYTES %: 0.3 % (ref 0–5)
LYMPHOCYTES ABSOLUTE: 0.92 E9/L (ref 1.5–4)
LYMPHOCYTES RELATIVE PERCENT: 13.4 % (ref 20–42)
MCH RBC QN AUTO: 26 PG (ref 26–35)
MCHC RBC AUTO-ENTMCNC: 31.5 % (ref 32–34.5)
MCV RBC AUTO: 82.7 FL (ref 80–99.9)
MONOCYTES ABSOLUTE: 0.59 E9/L (ref 0.1–0.95)
MONOCYTES RELATIVE PERCENT: 8.6 % (ref 2–12)
NEUTROPHILS ABSOLUTE: 5.11 E9/L (ref 1.8–7.3)
NEUTROPHILS RELATIVE PERCENT: 74.1 % (ref 43–80)
PDW BLD-RTO: 15.3 FL (ref 11.5–15)
PLATELET # BLD: 186 E9/L (ref 130–450)
PMV BLD AUTO: 12.1 FL (ref 7–12)
RBC # BLD: 3.92 E12/L (ref 3.5–5.5)
SEDIMENTATION RATE, ERYTHROCYTE: 35 MM/HR (ref 0–20)
TOTAL PROTEIN: 7.6 G/DL (ref 6.4–8.3)
WBC # BLD: 6.9 E9/L (ref 4.5–11.5)

## 2023-01-27 PROCEDURE — 84155 ASSAY OF PROTEIN SERUM: CPT

## 2023-01-27 PROCEDURE — 82248 BILIRUBIN DIRECT: CPT

## 2023-01-27 PROCEDURE — 84450 TRANSFERASE (AST) (SGOT): CPT

## 2023-01-27 PROCEDURE — 84460 ALANINE AMINO (ALT) (SGPT): CPT

## 2023-01-27 PROCEDURE — 85651 RBC SED RATE NONAUTOMATED: CPT

## 2023-01-27 PROCEDURE — 85025 COMPLETE CBC W/AUTO DIFF WBC: CPT

## 2023-01-27 PROCEDURE — 86140 C-REACTIVE PROTEIN: CPT

## 2023-01-27 PROCEDURE — 36415 COLL VENOUS BLD VENIPUNCTURE: CPT

## 2023-01-27 PROCEDURE — 82247 BILIRUBIN TOTAL: CPT

## 2023-01-27 PROCEDURE — 80069 RENAL FUNCTION PANEL: CPT

## 2023-01-27 PROCEDURE — 84075 ASSAY ALKALINE PHOSPHATASE: CPT

## 2023-01-28 LAB
ANION GAP SERPL CALCULATED.3IONS-SCNC: 14 MMOL/L (ref 7–16)
BUN BLDV-MCNC: 18 MG/DL (ref 6–23)
CALCIUM SERPL-MCNC: 9.3 MG/DL (ref 8.6–10.2)
CHLORIDE BLD-SCNC: 98 MMOL/L (ref 98–107)
CO2: 25 MMOL/L (ref 22–29)
CREAT SERPL-MCNC: 0.7 MG/DL (ref 0.5–1)
GFR SERPL CREATININE-BSD FRML MDRD: >60 ML/MIN/1.73
GLUCOSE BLD-MCNC: 119 MG/DL (ref 74–99)
PHOSPHORUS: 3.4 MG/DL (ref 2.5–4.5)
POTASSIUM SERPL-SCNC: 4.3 MMOL/L (ref 3.5–5)
SODIUM BLD-SCNC: 137 MMOL/L (ref 132–146)

## 2023-03-26 DIAGNOSIS — I20.9 NEW-ONSET ANGINA (HCC): ICD-10-CM

## 2023-03-27 RX ORDER — ISOSORBIDE MONONITRATE 30 MG/1
30 TABLET, EXTENDED RELEASE ORAL DAILY
Qty: 90 TABLET | Refills: 3 | Status: SHIPPED | OUTPATIENT
Start: 2023-03-27

## 2023-04-20 ENCOUNTER — HOSPITAL ENCOUNTER (OUTPATIENT)
Dept: GENERAL RADIOLOGY | Age: 73
Discharge: HOME | End: 2023-04-22
Payer: MEDICARE

## 2023-04-20 DIAGNOSIS — Z12.31 SCREENING MAMMOGRAM FOR HIGH-RISK PATIENT: ICD-10-CM

## 2023-04-20 PROCEDURE — 77063 BREAST TOMOSYNTHESIS BI: CPT

## 2023-05-03 DIAGNOSIS — I25.10 CORONARY ARTERY DISEASE INVOLVING NATIVE HEART WITHOUT ANGINA PECTORIS, UNSPECIFIED VESSEL OR LESION TYPE: ICD-10-CM

## 2023-05-03 RX ORDER — CARVEDILOL 3.12 MG/1
TABLET ORAL
Qty: 180 TABLET | Refills: 1 | Status: SHIPPED | OUTPATIENT
Start: 2023-05-03

## 2023-05-12 ENCOUNTER — OFFICE VISIT (OUTPATIENT)
Dept: CARDIOLOGY CLINIC | Age: 73
End: 2023-05-12

## 2023-05-12 VITALS
OXYGEN SATURATION: 95 % | WEIGHT: 202.8 LBS | SYSTOLIC BLOOD PRESSURE: 118 MMHG | BODY MASS INDEX: 40.88 KG/M2 | RESPIRATION RATE: 16 BRPM | DIASTOLIC BLOOD PRESSURE: 64 MMHG | HEART RATE: 80 BPM | HEIGHT: 59 IN

## 2023-05-12 DIAGNOSIS — R00.2 PALPITATIONS: ICD-10-CM

## 2023-05-12 DIAGNOSIS — I25.10 CORONARY ARTERY DISEASE INVOLVING NATIVE HEART WITHOUT ANGINA PECTORIS, UNSPECIFIED VESSEL OR LESION TYPE: Primary | ICD-10-CM

## 2023-05-12 RX ORDER — METOPROLOL SUCCINATE 25 MG/1
25 TABLET, EXTENDED RELEASE ORAL DAILY
Qty: 30 TABLET | Refills: 5 | Status: SHIPPED | OUTPATIENT
Start: 2023-05-12

## 2023-05-12 ASSESSMENT — ENCOUNTER SYMPTOMS
BLOOD IN STOOL: 0
ABDOMINAL PAIN: 0
SHORTNESS OF BREATH: 0
VOMITING: 0
COUGH: 0
CONSTIPATION: 0
NAUSEA: 0
DIARRHEA: 0
WHEEZING: 0
BACK PAIN: 0

## 2023-05-12 NOTE — PROGRESS NOTES
OUTPATIENT CARDIOLOGY FOLLOW-UP    HPI:    Name: Lynette Dutta    Age: 71 y.o. Primary Care Physician: Cheri Iverson DO    Date of Service: 5/12/2023    Chief Complaint: 1 year follow-up visit. History of present illness :   71-year-old obese female who comes today for 1 year follow-up. Patient underwent successful PCI of her proximal to mid RCA on 4/19/2021. She has history of CAD, cardiomyopathy with ejection fraction of 45-50%, hypertension, hyperlipidemia, PFO, right axillary stenosis secondary to thoracic outlet syndrome, hypothyroidism, and iatrogenic retroperitoneal bleed during left heart catheterization via the right femoral artery access. Patient was seen by Dr. Stefano Diego on 7/12/2021 for evaluation of PFO closure. PFO closure was recommended if developing recurrent TIA or stroke without alternative etiology of CVA. Patient daughter committed suicide last August.  Patient has not been active due to sore foot. She denies chest discomfort but has been complaining of chronic dyspnea on exertion. She feels palpitations at times but denies dizziness or syncope. She denies lower extremity edema. EKG done today revealed sinus rhythm at 80 bpm, nonspecific T wave changes and artifacts. Review of Systems:   Review of Systems   Constitutional:  Negative for chills, fatigue and fever. HENT:  Negative for nosebleeds. Respiratory:  Negative for cough, shortness of breath and wheezing. Gastrointestinal:  Negative for abdominal pain, blood in stool, constipation, diarrhea, nausea and vomiting. Genitourinary:  Negative for dysuria and hematuria. Musculoskeletal:  Negative for back pain, joint swelling and myalgias. Aching and sore foot. Neurological:  Negative for syncope and light-headedness. Psychiatric/Behavioral:  The patient is not nervous/anxious.          Past Medical History:  Past Medical History:   Diagnosis Date    Arthritis     CAD (coronary artery disease)

## 2023-05-12 NOTE — PROGRESS NOTES
14 day Zio XT Monitor was placed per Dr Benja Willis. Serial number [id number]. Instructions were given & patient verbalized understanding.

## 2023-06-16 DIAGNOSIS — R00.2 PALPITATIONS: ICD-10-CM

## 2023-06-26 PROBLEM — E66.813 CLASS 3 SEVERE OBESITY DUE TO EXCESS CALORIES WITH SERIOUS COMORBIDITY AND BODY MASS INDEX (BMI) OF 40.0 TO 44.9 IN ADULT: Status: ACTIVE | Noted: 2023-06-26

## 2023-06-26 PROBLEM — E66.812 CLASS 2 SEVERE OBESITY DUE TO EXCESS CALORIES WITH SERIOUS COMORBIDITY AND BODY MASS INDEX (BMI) OF 39.0 TO 39.9 IN ADULT: Status: RESOLVED | Noted: 2019-03-22 | Resolved: 2023-06-26

## 2023-06-26 PROBLEM — I51.9 LEFT VENTRICULAR SYSTOLIC DYSFUNCTION: Status: ACTIVE | Noted: 2023-06-26

## 2023-06-26 PROBLEM — E66.01 CLASS 2 SEVERE OBESITY DUE TO EXCESS CALORIES WITH SERIOUS COMORBIDITY AND BODY MASS INDEX (BMI) OF 39.0 TO 39.9 IN ADULT (HCC): Status: RESOLVED | Noted: 2019-03-22 | Resolved: 2023-06-26

## 2023-06-26 PROBLEM — I51.89 LEFT VENTRICULAR SYSTOLIC DYSFUNCTION: Status: ACTIVE | Noted: 2023-06-26

## 2023-06-26 PROBLEM — E66.01 CLASS 3 SEVERE OBESITY DUE TO EXCESS CALORIES WITH SERIOUS COMORBIDITY AND BODY MASS INDEX (BMI) OF 40.0 TO 44.9 IN ADULT (HCC): Status: ACTIVE | Noted: 2023-06-26

## 2023-06-30 ENCOUNTER — HOSPITAL ENCOUNTER (OUTPATIENT)
Age: 73
Discharge: HOME | End: 2023-06-30
Payer: MEDICARE

## 2023-06-30 LAB
ALBUMIN SERPL-MCNC: 4.4 G/DL (ref 3.5–5.2)
ALP SERPL-CCNC: 85 U/L (ref 35–104)
ALT SERPL-CCNC: 10 U/L (ref 0–32)
ANION GAP SERPL CALCULATED.3IONS-SCNC: 14 MMOL/L (ref 7–16)
AST SERPL-CCNC: 13 U/L (ref 0–31)
BASOPHILS # BLD: 0.02 E9/L (ref 0–0.2)
BASOPHILS NFR BLD: 0.3 % (ref 0–2)
BILIRUB DIRECT SERPL-MCNC: <0.2 MG/DL (ref 0–0.3)
BILIRUB INDIRECT SERPL-MCNC: NORMAL MG/DL (ref 0–1)
BILIRUB SERPL-MCNC: 0.3 MG/DL (ref 0–1.2)
BUN SERPL-MCNC: 18 MG/DL (ref 6–23)
CALCIUM SERPL-MCNC: 9.1 MG/DL (ref 8.6–10.2)
CHLORIDE SERPL-SCNC: 102 MMOL/L (ref 98–107)
CO2 SERPL-SCNC: 25 MMOL/L (ref 22–29)
CREAT SERPL-MCNC: 0.8 MG/DL (ref 0.5–1)
CRP SERPL HS-MCNC: 0.8 MG/DL (ref 0–0.4)
EOSINOPHIL # BLD: 0.14 E9/L (ref 0.05–0.5)
EOSINOPHIL NFR BLD: 2.3 % (ref 0–6)
ERYTHROCYTE [DISTWIDTH] IN BLOOD BY AUTOMATED COUNT: 14 FL (ref 11.5–15)
ERYTHROCYTE [SEDIMENTATION RATE] IN BLOOD BY WESTERGREN METHOD: 53 MM/HR (ref 0–20)
GLUCOSE SERPL-MCNC: 165 MG/DL (ref 74–99)
HCT VFR BLD AUTO: 32.5 % (ref 34–48)
HGB BLD-MCNC: 10 G/DL (ref 11.5–15.5)
IMM GRANULOCYTES # BLD: 0.04 E9/L
IMM GRANULOCYTES NFR BLD: 0.7 % (ref 0–5)
LYMPHOCYTES # BLD: 0.83 E9/L (ref 1.5–4)
LYMPHOCYTES NFR BLD: 13.8 % (ref 20–42)
MCH RBC QN AUTO: 26.5 PG (ref 26–35)
MCHC RBC AUTO-ENTMCNC: 30.8 % (ref 32–34.5)
MCV RBC AUTO: 86.2 FL (ref 80–99.9)
MONOCYTES # BLD: 0.54 E9/L (ref 0.1–0.95)
MONOCYTES NFR BLD: 9 % (ref 2–12)
NEUTROPHILS # BLD: 4.45 E9/L (ref 1.8–7.3)
NEUTS SEG NFR BLD: 73.9 % (ref 43–80)
PHOSPHATE SERPL-MCNC: 2.8 MG/DL (ref 2.5–4.5)
PLATELET # BLD AUTO: 187 E9/L (ref 130–450)
PMV BLD AUTO: 10.7 FL (ref 7–12)
POTASSIUM SERPL-SCNC: 4.1 MMOL/L (ref 3.5–5)
PROT SERPL-MCNC: 7.7 G/DL (ref 6.4–8.3)
RBC # BLD AUTO: 3.77 E12/L (ref 3.5–5.5)
SODIUM SERPL-SCNC: 141 MMOL/L (ref 132–146)
WBC # BLD: 6 E9/L (ref 4.5–11.5)

## 2023-06-30 PROCEDURE — 36415 COLL VENOUS BLD VENIPUNCTURE: CPT

## 2023-06-30 PROCEDURE — 84075 ASSAY ALKALINE PHOSPHATASE: CPT

## 2023-06-30 PROCEDURE — 85651 RBC SED RATE NONAUTOMATED: CPT

## 2023-06-30 PROCEDURE — 86140 C-REACTIVE PROTEIN: CPT

## 2023-06-30 PROCEDURE — 80069 RENAL FUNCTION PANEL: CPT

## 2023-06-30 PROCEDURE — 82247 BILIRUBIN TOTAL: CPT

## 2023-06-30 PROCEDURE — 84155 ASSAY OF PROTEIN SERUM: CPT

## 2023-06-30 PROCEDURE — 84450 TRANSFERASE (AST) (SGOT): CPT

## 2023-06-30 PROCEDURE — 84460 ALANINE AMINO (ALT) (SGPT): CPT

## 2023-06-30 PROCEDURE — 82248 BILIRUBIN DIRECT: CPT

## 2023-06-30 PROCEDURE — 85025 COMPLETE CBC W/AUTO DIFF WBC: CPT

## 2023-08-08 NOTE — TELEPHONE ENCOUNTER
Her event recorder was normal between February and March. She is to avoid caffeinated beverages and chocolate. Prednisone Counseling:  I discussed with the patient the risks of prolonged use of prednisone including but not limited to weight gain, insomnia, osteoporosis, mood changes, diabetes, susceptibility to infection, glaucoma and high blood pressure.  In cases where prednisone use is prolonged, patients should be monitored with blood pressure checks, serum glucose levels and an eye exam.  Additionally, the patient may need to be placed on GI prophylaxis, PCP prophylaxis, and calcium and vitamin D supplementation and/or a bisphosphonate.  The patient verbalized understanding of the proper use and the possible adverse effects of prednisone.  All of the patient's questions and concerns were addressed.

## 2023-08-22 DIAGNOSIS — I20.9 NEW-ONSET ANGINA (HCC): ICD-10-CM

## 2023-08-22 RX ORDER — ISOSORBIDE MONONITRATE 30 MG/1
30 TABLET, EXTENDED RELEASE ORAL DAILY
Qty: 90 TABLET | Refills: 3 | Status: SHIPPED | OUTPATIENT
Start: 2023-08-22

## 2023-08-22 RX ORDER — CARVEDILOL 3.12 MG/1
3.12 TABLET ORAL 2 TIMES DAILY
Qty: 180 TABLET | Refills: 1 | Status: SHIPPED | OUTPATIENT
Start: 2023-08-22

## 2023-09-07 ENCOUNTER — OFFICE VISIT (OUTPATIENT)
Dept: NON INVASIVE DIAGNOSTICS | Age: 73
End: 2023-09-07
Payer: MEDICARE

## 2023-09-07 VITALS
HEIGHT: 59 IN | BODY MASS INDEX: 40.32 KG/M2 | RESPIRATION RATE: 16 BRPM | WEIGHT: 200 LBS | DIASTOLIC BLOOD PRESSURE: 72 MMHG | HEART RATE: 72 BPM | SYSTOLIC BLOOD PRESSURE: 116 MMHG

## 2023-09-07 DIAGNOSIS — I51.9 LEFT VENTRICULAR SYSTOLIC DYSFUNCTION: Primary | ICD-10-CM

## 2023-09-07 PROCEDURE — 99204 OFFICE O/P NEW MOD 45 MIN: CPT | Performed by: INTERNAL MEDICINE

## 2023-09-07 PROCEDURE — 3078F DIAST BP <80 MM HG: CPT | Performed by: INTERNAL MEDICINE

## 2023-09-07 PROCEDURE — 93000 ELECTROCARDIOGRAM COMPLETE: CPT | Performed by: INTERNAL MEDICINE

## 2023-09-07 PROCEDURE — 3074F SYST BP LT 130 MM HG: CPT | Performed by: INTERNAL MEDICINE

## 2023-09-07 PROCEDURE — 1123F ACP DISCUSS/DSCN MKR DOCD: CPT | Performed by: INTERNAL MEDICINE

## 2023-09-07 RX ORDER — FERROUS SULFATE 325(65) MG
325 TABLET ORAL
COMMUNITY

## 2023-09-07 NOTE — PROGRESS NOTES
[address] [facility] Electrophysiology  Consultation report  PATIENT: Kiana Sanon  MEDICAL RECORD NUMBER: [id number]  DATE OF SERVICE:  9/7/2023  ATTENDING ELECTROPHYSIOLOGIST: Audrey Childress MD  REFERRING PHYSICIAN: Pietro Hyde MD and Pamela Hernandez DO  CHIEF COMPLAINT:   Chief Complaint   Patient presents with    Coronary Artery Disease     NP per Derek Tapia DX SVT         HPI: This is a 71 y.o. female with a history of coronary artery disease, hypertension, hyperlipidemia, s/p PCI of the proximal to mid RCA in 2021, diverticulosis, axillary artery stenosis with thoracic outlet syndrome, depression ,hypothyroidism and PFO. PFO closure was recommended in 2021 for recurrent TIA or stroke. Has lost 2 daughters to suicide and therefore depressed and inactive. Has noticed episodes of palpitations that occur approximately once a month and last for a few seconds to a minute. Episodes are never longer or severe enough to lead her to the hospital.  No episodes of syncope or near syncope.   No history of episodes of chest discomfort at rest or on exertion in the recent past.  She does however complain of occasional sharp chest discomfort which resolves spontaneously and is not related to exertion      Patient Active Problem List    Diagnosis Date Noted    Class 3 severe obesity due to excess calories with serious comorbidity and body mass index (BMI) of 40.0 to 44.9 in adult [facility]) 06/26/2023    Left ventricular systolic dysfunction 76/34/5513    Screening mammogram for high-risk patient 02/28/2022    Acute gastrointestinal hemorrhage 11/22/2021    Ureteral sludge 11/22/2021    Coronary artery disease involving native heart 04/19/2021    Tracheomalacia 08/26/2020    Severe episode of recurrent major depressive disorder, without psychotic features ([address]) 11/11/2019    Superficial phlebitis and thrombophlebitis of right lower extremity 07/24/2019    ROSALVA (obstructive sleep apnea)

## 2023-09-26 ENCOUNTER — HOSPITAL ENCOUNTER (OUTPATIENT)
Age: 73
Discharge: HOME | End: 2023-09-26
Payer: MEDICARE

## 2023-09-26 PROBLEM — I51.9 LEFT VENTRICULAR SYSTOLIC DYSFUNCTION: Status: RESOLVED | Noted: 2023-06-26 | Resolved: 2023-09-26

## 2023-09-26 PROBLEM — G47.33 OSA (OBSTRUCTIVE SLEEP APNEA): Status: RESOLVED | Noted: 2019-03-22 | Resolved: 2023-09-26

## 2023-09-26 PROBLEM — E66.813 CLASS 3 SEVERE OBESITY DUE TO EXCESS CALORIES WITH SERIOUS COMORBIDITY AND BODY MASS INDEX (BMI) OF 40.0 TO 44.9 IN ADULT: Status: RESOLVED | Noted: 2023-06-26 | Resolved: 2023-09-26

## 2023-09-26 PROBLEM — I51.89 LEFT VENTRICULAR SYSTOLIC DYSFUNCTION: Status: RESOLVED | Noted: 2023-06-26 | Resolved: 2023-09-26

## 2023-09-26 PROBLEM — E66.01 CLASS 2 SEVERE OBESITY DUE TO EXCESS CALORIES WITH SERIOUS COMORBIDITY AND BODY MASS INDEX (BMI) OF 39.0 TO 39.9 IN ADULT (HCC): Status: ACTIVE | Noted: 2023-09-26

## 2023-09-26 PROBLEM — E66.01 CLASS 3 SEVERE OBESITY DUE TO EXCESS CALORIES WITH SERIOUS COMORBIDITY AND BODY MASS INDEX (BMI) OF 40.0 TO 44.9 IN ADULT (HCC): Status: RESOLVED | Noted: 2023-06-26 | Resolved: 2023-09-26

## 2023-09-26 PROBLEM — E66.812 CLASS 2 SEVERE OBESITY DUE TO EXCESS CALORIES WITH SERIOUS COMORBIDITY AND BODY MASS INDEX (BMI) OF 39.0 TO 39.9 IN ADULT: Status: ACTIVE | Noted: 2023-09-26

## 2023-09-26 LAB
ALBUMIN SERPL-MCNC: 4.7 G/DL (ref 3.5–5.2)
ALBUMIN SERPL-MCNC: 4.7 G/DL (ref 3.5–5.2)
ALP SERPL-CCNC: 81 U/L (ref 35–104)
ALT SERPL-CCNC: 11 U/L (ref 0–32)
ANION GAP SERPL CALCULATED.3IONS-SCNC: 14 MMOL/L (ref 7–16)
AST SERPL-CCNC: 16 U/L (ref 0–31)
BASOPHILS # BLD: 0.03 K/UL (ref 0–0.2)
BASOPHILS NFR BLD: 0 % (ref 0–2)
BILIRUB DIRECT SERPL-MCNC: <0.2 MG/DL (ref 0–0.3)
BILIRUB INDIRECT SERPL-MCNC: NORMAL MG/DL (ref 0–1)
BILIRUB SERPL-MCNC: 0.4 MG/DL (ref 0–1.2)
BUN SERPL-MCNC: 19 MG/DL (ref 6–23)
CALCIUM SERPL-MCNC: 9.3 MG/DL (ref 8.6–10.2)
CHLORIDE SERPL-SCNC: 102 MMOL/L (ref 98–107)
CO2 SERPL-SCNC: 25 MMOL/L (ref 22–29)
CREAT SERPL-MCNC: 0.7 MG/DL (ref 0.5–1)
CRP SERPL HS-MCNC: 5 MG/L (ref 0–5)
EOSINOPHIL # BLD: 0.17 K/UL (ref 0.05–0.5)
EOSINOPHILS RELATIVE PERCENT: 3 % (ref 0–6)
ERYTHROCYTE [DISTWIDTH] IN BLOOD BY AUTOMATED COUNT: 14.1 % (ref 11.5–15)
ERYTHROCYTE [SEDIMENTATION RATE] IN BLOOD BY WESTERGREN METHOD: 47 MM/HR (ref 0–20)
GFR SERPL CREATININE-BSD FRML MDRD: >60 ML/MIN/1.73M2
GLUCOSE SERPL-MCNC: 91 MG/DL (ref 74–99)
HCT VFR BLD AUTO: 33.6 % (ref 34–48)
HGB BLD-MCNC: 10.6 G/DL (ref 11.5–15.5)
IMM GRANULOCYTES # BLD AUTO: 0.04 K/UL (ref 0–0.58)
IMM GRANULOCYTES NFR BLD: 1 % (ref 0–5)
LYMPHOCYTES NFR BLD: 0.95 K/UL (ref 1.5–4)
LYMPHOCYTES RELATIVE PERCENT: 14 % (ref 20–42)
MCH RBC QN AUTO: 26.9 PG (ref 26–35)
MCHC RBC AUTO-ENTMCNC: 31.5 G/DL (ref 32–34.5)
MCV RBC AUTO: 85.3 FL (ref 80–99.9)
MONOCYTES NFR BLD: 0.51 K/UL (ref 0.1–0.95)
MONOCYTES NFR BLD: 8 % (ref 2–12)
NEUTROPHILS NFR BLD: 75 % (ref 43–80)
NEUTS SEG NFR BLD: 4.99 K/UL (ref 1.8–7.3)
PHOSPHATE SERPL-MCNC: 3.5 MG/DL (ref 2.5–4.5)
PLATELET # BLD AUTO: 212 K/UL (ref 130–450)
PMV BLD AUTO: 11.6 FL (ref 7–12)
POTASSIUM SERPL-SCNC: 4.7 MMOL/L (ref 3.5–5)
PROT SERPL-MCNC: 8.1 G/DL (ref 6.4–8.3)
RBC # BLD AUTO: 3.94 M/UL (ref 3.5–5.5)
SODIUM SERPL-SCNC: 141 MMOL/L (ref 132–146)
WBC OTHER # BLD: 6.7 K/UL (ref 4.5–11.5)

## 2023-09-26 PROCEDURE — 85652 RBC SED RATE AUTOMATED: CPT

## 2023-09-26 PROCEDURE — 80069 RENAL FUNCTION PANEL: CPT

## 2023-09-26 PROCEDURE — 85025 COMPLETE CBC W/AUTO DIFF WBC: CPT

## 2023-09-26 PROCEDURE — 86140 C-REACTIVE PROTEIN: CPT

## 2023-09-26 PROCEDURE — 36415 COLL VENOUS BLD VENIPUNCTURE: CPT

## 2023-09-26 PROCEDURE — 80076 HEPATIC FUNCTION PANEL: CPT

## 2023-10-24 DIAGNOSIS — I25.10 ATHEROSCLEROTIC HEART DISEASE OF NATIVE CORONARY ARTERY WITHOUT ANGINA PECTORIS: ICD-10-CM

## 2023-10-25 RX ORDER — CARVEDILOL 3.12 MG/1
3.12 TABLET ORAL 2 TIMES DAILY WITH MEALS
Qty: 180 TABLET | Refills: 1 | Status: SHIPPED | OUTPATIENT
Start: 2023-10-25

## 2023-11-06 DIAGNOSIS — I25.10 CORONARY ARTERY DISEASE INVOLVING NATIVE HEART WITHOUT ANGINA PECTORIS, UNSPECIFIED VESSEL OR LESION TYPE: ICD-10-CM

## 2023-11-06 RX ORDER — NITROGLYCERIN 0.4 MG/1
0.4 TABLET SUBLINGUAL EVERY 5 MIN PRN
Qty: 25 TABLET | Refills: 3 | Status: SHIPPED | OUTPATIENT
Start: 2023-11-06

## 2024-01-12 PROBLEM — K92.2 ACUTE GASTROINTESTINAL HEMORRHAGE: Status: RESOLVED | Noted: 2021-11-22 | Resolved: 2024-01-12

## 2024-01-12 PROBLEM — I80.01 SUPERFICIAL PHLEBITIS AND THROMBOPHLEBITIS OF RIGHT LOWER EXTREMITY: Status: RESOLVED | Noted: 2019-07-24 | Resolved: 2024-01-12

## 2024-02-09 ENCOUNTER — OFFICE VISIT (OUTPATIENT)
Dept: CARDIOLOGY CLINIC | Age: 74
End: 2024-02-09
Payer: MEDICARE

## 2024-02-09 VITALS
RESPIRATION RATE: 20 BRPM | OXYGEN SATURATION: 95 % | HEART RATE: 76 BPM | WEIGHT: 201.4 LBS | DIASTOLIC BLOOD PRESSURE: 80 MMHG | SYSTOLIC BLOOD PRESSURE: 120 MMHG | BODY MASS INDEX: 40.6 KG/M2 | HEIGHT: 59 IN

## 2024-02-09 DIAGNOSIS — I20.9 NEW-ONSET ANGINA (HCC): ICD-10-CM

## 2024-02-09 DIAGNOSIS — I49.8 FLUTTERING HEART: Primary | ICD-10-CM

## 2024-02-09 PROCEDURE — 93000 ELECTROCARDIOGRAM COMPLETE: CPT | Performed by: INTERNAL MEDICINE

## 2024-02-09 PROCEDURE — 3079F DIAST BP 80-89 MM HG: CPT | Performed by: INTERNAL MEDICINE

## 2024-02-09 PROCEDURE — 99214 OFFICE O/P EST MOD 30 MIN: CPT | Performed by: INTERNAL MEDICINE

## 2024-02-09 PROCEDURE — 1123F ACP DISCUSS/DSCN MKR DOCD: CPT | Performed by: INTERNAL MEDICINE

## 2024-02-09 PROCEDURE — 3074F SYST BP LT 130 MM HG: CPT | Performed by: INTERNAL MEDICINE

## 2024-02-09 RX ORDER — ISOSORBIDE MONONITRATE 60 MG/1
60 TABLET, EXTENDED RELEASE ORAL DAILY
Qty: 30 TABLET | Refills: 5 | Status: SHIPPED | OUTPATIENT
Start: 2024-02-09

## 2024-02-09 RX ORDER — METOPROLOL SUCCINATE 25 MG/1
25 TABLET, EXTENDED RELEASE ORAL DAILY
Qty: 30 TABLET | Refills: 5 | Status: SHIPPED | OUTPATIENT
Start: 2024-02-09

## 2024-02-09 ASSESSMENT — ENCOUNTER SYMPTOMS
DIARRHEA: 0
NAUSEA: 0
CHEST TIGHTNESS: 1
ABDOMINAL PAIN: 0
VOMITING: 0
CONSTIPATION: 0
COUGH: 0
BLOOD IN STOOL: 0
BACK PAIN: 0
WHEEZING: 0
SHORTNESS OF BREATH: 1

## 2024-02-09 NOTE — PROGRESS NOTES
OUTPATIENT CARDIOLOGY FOLLOW-UP    HPI:    Name: Nicole Milton    Age: 73 y.o.    Primary Care Physician: Pamela Hernandez DO    Date of Service: 2024    Chief Complaint:   Chief Complaint   Patient presents with    fluttering     Ov- Fluttering Dr. Ross is out. Pt is dizzy,sob, heart flutters.        History of present illness :   72-year-old obese female who comes today for evaluation of palpitations.  She was last seen in my office on 2023.  Patient underwent successful PCI of her proximal to mid RCA on 2021.  She has history of CAD, cardiomyopathy with ejection fraction of 45-50%, hypertension, hyperlipidemia, PFO, right axillary stenosis secondary to thoracic outlet syndrome, hypothyroidism, and iatrogenic retroperitoneal bleed during left heart catheterization via the right femoral artery access.  Patient was seen by Dr. Valencia on 2021 for evaluation of PFO closure.  PFO closure was recommended if developing recurrent TIA or stroke without alternative etiology of CVA.    Patient has been complaining of daily palpitations over the past couple of months.  Her palpitations last up to a minute.  Patient also has been complaining of chest discomfort at times with activity.  The discomfort does not occur every time she is active.  It is described as pressure lasting up to 5 minutes.  She also has been complaining of dyspnea on exertion.  She feels dizzy but denies syncope.  She admits to get lower extremity edema.  Her brother  recently.    EKG done today revealed sinus rhythm at 76 bpm, left atrial enlargement and nonspecific ST wave changes.       Review of Systems:   Review of Systems   Constitutional:  Negative for chills, fatigue and fever.   HENT:  Negative for nosebleeds.    Respiratory:  Positive for chest tightness and shortness of breath. Negative for cough and wheezing.         She snores at night but denies gasping for air.   Cardiovascular:  Positive for palpitations and

## 2024-02-20 ENCOUNTER — HOSPITAL ENCOUNTER (OUTPATIENT)
Age: 74
Discharge: HOME | End: 2024-02-20
Payer: MEDICARE

## 2024-02-20 LAB
ALBUMIN SERPL-MCNC: 4.4 G/DL (ref 3.5–5.2)
ALP SERPL-CCNC: 84 U/L (ref 35–104)
ALT SERPL-CCNC: 11 U/L (ref 0–32)
ANION GAP SERPL CALCULATED.3IONS-SCNC: 12 MMOL/L (ref 7–16)
AST SERPL-CCNC: 12 U/L (ref 0–31)
BASOPHILS # BLD: 0.04 K/UL (ref 0–0.2)
BASOPHILS NFR BLD: 1 % (ref 0–2)
BILIRUB DIRECT SERPL-MCNC: <0.2 MG/DL (ref 0–0.3)
BILIRUB INDIRECT SERPL-MCNC: NORMAL MG/DL (ref 0–1)
BILIRUB SERPL-MCNC: 0.4 MG/DL (ref 0–1.2)
BUN SERPL-MCNC: 23 MG/DL (ref 6–23)
CALCIUM SERPL-MCNC: 9.2 MG/DL (ref 8.6–10.2)
CHLORIDE SERPL-SCNC: 102 MMOL/L (ref 98–107)
CO2 SERPL-SCNC: 26 MMOL/L (ref 22–29)
CREAT SERPL-MCNC: 0.7 MG/DL (ref 0.5–1)
CRP SERPL HS-MCNC: 3 MG/L (ref 0–5)
EOSINOPHIL # BLD: 0.15 K/UL (ref 0.05–0.5)
EOSINOPHILS RELATIVE PERCENT: 2 % (ref 0–6)
ERYTHROCYTE [DISTWIDTH] IN BLOOD BY AUTOMATED COUNT: 14 % (ref 11.5–15)
ERYTHROCYTE [SEDIMENTATION RATE] IN BLOOD BY WESTERGREN METHOD: 23 MM/HR (ref 0–20)
FOLATE SERPL-MCNC: >20 NG/ML (ref 4.8–24.2)
GFR SERPL CREATININE-BSD FRML MDRD: >60 ML/MIN/1.73M2
GLUCOSE SERPL-MCNC: 86 MG/DL (ref 74–99)
HCT VFR BLD AUTO: 34.6 % (ref 34–48)
HGB BLD-MCNC: 10.8 G/DL (ref 11.5–15.5)
IMM GRANULOCYTES # BLD AUTO: 0.04 K/UL (ref 0–0.58)
IMM GRANULOCYTES NFR BLD: 1 % (ref 0–5)
IRON SATN MFR SERPL: 18 % (ref 15–50)
IRON SERPL-MCNC: 49 UG/DL (ref 37–145)
LYMPHOCYTES NFR BLD: 1.03 K/UL (ref 1.5–4)
LYMPHOCYTES RELATIVE PERCENT: 15 % (ref 20–42)
MCH RBC QN AUTO: 26.7 PG (ref 26–35)
MCHC RBC AUTO-ENTMCNC: 31.2 G/DL (ref 32–34.5)
MCV RBC AUTO: 85.4 FL (ref 80–99.9)
MONOCYTES NFR BLD: 0.55 K/UL (ref 0.1–0.95)
MONOCYTES NFR BLD: 8 % (ref 2–12)
NEUTROPHILS NFR BLD: 74 % (ref 43–80)
NEUTS SEG NFR BLD: 5.21 K/UL (ref 1.8–7.3)
PHOSPHATE SERPL-MCNC: 3.5 MG/DL (ref 2.5–4.5)
PLATELET # BLD AUTO: 204 K/UL (ref 130–450)
PMV BLD AUTO: 11.2 FL (ref 7–12)
POTASSIUM SERPL-SCNC: 4.6 MMOL/L (ref 3.5–5)
PROT SERPL-MCNC: 7.9 G/DL (ref 6.4–8.3)
RBC # BLD AUTO: 4.05 M/UL (ref 3.5–5.5)
SODIUM SERPL-SCNC: 140 MMOL/L (ref 132–146)
TIBC SERPL-MCNC: 280 UG/DL (ref 250–450)
VIT B12 SERPL-MCNC: 511 PG/ML (ref 211–946)
WBC OTHER # BLD: 7 K/UL (ref 4.5–11.5)

## 2024-02-20 PROCEDURE — 36415 COLL VENOUS BLD VENIPUNCTURE: CPT

## 2024-02-20 PROCEDURE — 82746 ASSAY OF FOLIC ACID SERUM: CPT

## 2024-02-20 PROCEDURE — 84100 ASSAY OF PHOSPHORUS: CPT

## 2024-02-20 PROCEDURE — 80053 COMPREHEN METABOLIC PANEL: CPT

## 2024-02-20 PROCEDURE — 82607 VITAMIN B-12: CPT

## 2024-02-20 PROCEDURE — 83540 ASSAY OF IRON: CPT

## 2024-02-20 PROCEDURE — 82248 BILIRUBIN DIRECT: CPT

## 2024-02-20 PROCEDURE — 83550 IRON BINDING TEST: CPT

## 2024-02-20 PROCEDURE — 85652 RBC SED RATE AUTOMATED: CPT

## 2024-02-20 PROCEDURE — 85025 COMPLETE CBC W/AUTO DIFF WBC: CPT

## 2024-02-20 PROCEDURE — 86140 C-REACTIVE PROTEIN: CPT

## 2024-04-11 ENCOUNTER — OFFICE VISIT (OUTPATIENT)
Dept: NON INVASIVE DIAGNOSTICS | Age: 74
End: 2024-04-11
Payer: MEDICARE

## 2024-04-11 VITALS
DIASTOLIC BLOOD PRESSURE: 72 MMHG | HEIGHT: 59 IN | HEART RATE: 77 BPM | RESPIRATION RATE: 26 BRPM | BODY MASS INDEX: 40.12 KG/M2 | SYSTOLIC BLOOD PRESSURE: 128 MMHG | WEIGHT: 199 LBS

## 2024-04-11 DIAGNOSIS — I42.8 NONISCHEMIC CARDIOMYOPATHY (HCC): Primary | ICD-10-CM

## 2024-04-11 PROCEDURE — 3078F DIAST BP <80 MM HG: CPT | Performed by: INTERNAL MEDICINE

## 2024-04-11 PROCEDURE — 93000 ELECTROCARDIOGRAM COMPLETE: CPT | Performed by: INTERNAL MEDICINE

## 2024-04-11 PROCEDURE — 99213 OFFICE O/P EST LOW 20 MIN: CPT | Performed by: INTERNAL MEDICINE

## 2024-04-11 PROCEDURE — 1123F ACP DISCUSS/DSCN MKR DOCD: CPT | Performed by: INTERNAL MEDICINE

## 2024-04-11 PROCEDURE — 3074F SYST BP LT 130 MM HG: CPT | Performed by: INTERNAL MEDICINE

## 2024-04-11 RX ORDER — CARVEDILOL 3.12 MG/1
3.12 TABLET ORAL 2 TIMES DAILY WITH MEALS
COMMUNITY

## 2024-04-11 NOTE — PROGRESS NOTES
NYHA  Patient has refused Entresto and metoprolol as suggested by her cardiologist despite her symptoms of dyspnea.    4.  Obesity, sleep apnea.  Currently untreated    5.  Hypertension longstanding    6.  Hyperlipidemia    7.  PFO--no intervention indicated at the present time     8.  Depression    Recommendations:    Current medical therapy but patient was asked to take Imdur at bedtime, Coreg with breakfast and supper  Would not want to increase dose of beta-blockade for fear of worsening depression  Follow-up in 1 year or earlier if patient has persistent symptoms of palpitations     All of the above was discussed with the patient ,reviewing the above stated recommendations.  And a total of >50% of that time involved face-to-face time providing counseling and or coordination of care with the other providers, preparation for the clinic visit, reviewing records/tests, counseling/education of the patient, ordering medications/tests/procedures, coordinating care, and documenting clinical information in the EHR.     Thank you for allowing me to participate in your patient's care.  Please call me if there are any questions or concerns.      Simona Ross MD, [facility]  Cardiac Electrophysiology  [facility]  [facility]: [facility]

## 2024-04-29 ENCOUNTER — CLINICAL DOCUMENTATION (OUTPATIENT)
Dept: GENERAL RADIOLOGY | Age: 74
End: 2024-04-29

## 2024-04-29 ENCOUNTER — HOSPITAL ENCOUNTER (OUTPATIENT)
Dept: GENERAL RADIOLOGY | Age: 74
Discharge: HOME | End: 2024-05-01

## 2024-04-29 DIAGNOSIS — Z12.31 ENCOUNTER FOR SCREENING MAMMOGRAM FOR MALIGNANT NEOPLASM OF BREAST: ICD-10-CM

## 2024-04-29 NOTE — PROGRESS NOTES
Spoke with Dr Hernandez's office regarding breast imaging orders.  Patient is here for a screening mammogram, but states she has a left breast mass at 3:00.  Requested diagnostic orders.  Office is having trouble with their fax.  Patient to be rescheduled.

## 2024-05-02 RX ORDER — CARVEDILOL 3.12 MG/1
TABLET ORAL
Qty: 180 TABLET | Refills: 0 | Status: SHIPPED | OUTPATIENT
Start: 2024-05-02

## 2024-05-02 NOTE — TELEPHONE ENCOUNTER
Patient coreg was discontinued at last visit. Patient is not taking toprol.   Patient also didn't increase the Imdur like you wanted her to do.

## 2024-05-07 ENCOUNTER — HOSPITAL ENCOUNTER (OUTPATIENT)
Dept: GENERAL RADIOLOGY | Age: 74
Discharge: HOME | End: 2024-05-09
Payer: MEDICARE

## 2024-05-07 VITALS — WEIGHT: 199 LBS | BODY MASS INDEX: 40.12 KG/M2 | HEIGHT: 59 IN

## 2024-05-07 DIAGNOSIS — N63.20 MASS OF LEFT BREAST, UNSPECIFIED QUADRANT: ICD-10-CM

## 2024-05-07 PROCEDURE — 76642 ULTRASOUND BREAST LIMITED: CPT

## 2024-05-07 PROCEDURE — G0279 TOMOSYNTHESIS, MAMMO: HCPCS

## 2024-05-09 RX ORDER — CARVEDILOL 3.12 MG/1
TABLET ORAL
OUTPATIENT
Start: 2024-05-09

## 2024-05-10 RX ORDER — CARVEDILOL 3.12 MG/1
3.12 TABLET ORAL 2 TIMES DAILY WITH MEALS
Qty: 180 TABLET | Refills: 0 | OUTPATIENT
Start: 2024-05-10

## 2024-05-30 ENCOUNTER — HOSPITAL ENCOUNTER (OUTPATIENT)
Age: 74
Discharge: HOME | End: 2024-05-30
Payer: COMMERCIAL

## 2024-05-30 LAB
ALBUMIN SERPL-MCNC: 4.4 G/DL (ref 3.5–5.2)
ALBUMIN: 4.4 G/DL (ref 3.5–5.2)
ALP SERPL-CCNC: 79 U/L (ref 35–104)
ALT SERPL-CCNC: 11 U/L (ref 0–32)
ANION GAP SERPL CALCULATED.3IONS-SCNC: 18 MMOL/L (ref 7–16)
AST SERPL-CCNC: 14 U/L (ref 0–31)
BASOPHILS # BLD: 0.03 K/UL (ref 0–0.2)
BASOPHILS NFR BLD: 1 % (ref 0–2)
BILIRUB DIRECT SERPL-MCNC: <0.2 MG/DL (ref 0–0.3)
BILIRUB INDIRECT SERPL-MCNC: NORMAL MG/DL (ref 0–1)
BILIRUB SERPL-MCNC: 0.4 MG/DL (ref 0–1.2)
BUN SERPL-MCNC: 20 MG/DL (ref 6–23)
CALCIUM SERPL-MCNC: 9.1 MG/DL (ref 8.6–10.2)
CHLORIDE SERPL-SCNC: 103 MMOL/L (ref 98–107)
CHOLEST SERPL-MCNC: 136 MG/DL
CO2 SERPL-SCNC: 19 MMOL/L (ref 22–29)
CREAT SERPL-MCNC: 0.7 MG/DL (ref 0.5–1)
CRP SERPL HS-MCNC: 4 MG/L (ref 0–5)
EOSINOPHIL # BLD: 0.16 K/UL (ref 0.05–0.5)
EOSINOPHILS RELATIVE PERCENT: 3 % (ref 0–6)
ERYTHROCYTE [DISTWIDTH] IN BLOOD BY AUTOMATED COUNT: 13.5 % (ref 11.5–15)
ERYTHROCYTE [SEDIMENTATION RATE] IN BLOOD BY WESTERGREN METHOD: 42 MM/HR (ref 0–20)
GFR, ESTIMATED: >90 ML/MIN/1.73M2
GLUCOSE SERPL-MCNC: 102 MG/DL (ref 74–99)
HCT VFR BLD AUTO: 32 % (ref 34–48)
HDLC SERPL-MCNC: 53 MG/DL
HGB BLD-MCNC: 10.4 G/DL (ref 11.5–15.5)
IMM GRANULOCYTES # BLD AUTO: 0.03 K/UL (ref 0–0.58)
IMM GRANULOCYTES NFR BLD: 1 % (ref 0–5)
IRON SATN MFR SERPL: 16 % (ref 15–50)
IRON SERPL-MCNC: 42 UG/DL (ref 37–145)
LDLC SERPL CALC-MCNC: 51 MG/DL
LYMPHOCYTES NFR BLD: 0.76 K/UL (ref 1.5–4)
LYMPHOCYTES RELATIVE PERCENT: 12 % (ref 20–42)
MCH RBC QN AUTO: 27.7 PG (ref 26–35)
MCHC RBC AUTO-ENTMCNC: 32.5 G/DL (ref 32–34.5)
MCV RBC AUTO: 85.3 FL (ref 80–99.9)
MONOCYTES NFR BLD: 0.51 K/UL (ref 0.1–0.95)
MONOCYTES NFR BLD: 8 % (ref 2–12)
NEUTROPHILS NFR BLD: 77 % (ref 43–80)
NEUTS SEG NFR BLD: 4.89 K/UL (ref 1.8–7.3)
PHOSPHATE SERPL-MCNC: 3.9 MG/DL (ref 2.5–4.5)
PLATELET # BLD AUTO: 203 K/UL (ref 130–450)
PMV BLD AUTO: 11.7 FL (ref 7–12)
POTASSIUM SERPL-SCNC: 4.5 MMOL/L (ref 3.5–5)
PROT SERPL-MCNC: 7.8 G/DL (ref 6.4–8.3)
RBC # BLD AUTO: 3.75 M/UL (ref 3.5–5.5)
SODIUM SERPL-SCNC: 140 MMOL/L (ref 132–146)
TIBC SERPL-MCNC: 268 UG/DL (ref 250–450)
TRIGL SERPL-MCNC: 160 MG/DL
VLDLC SERPL CALC-MCNC: 32 MG/DL
WBC OTHER # BLD: 6.4 K/UL (ref 4.5–11.5)

## 2024-05-30 PROCEDURE — 83550 IRON BINDING TEST: CPT

## 2024-05-30 PROCEDURE — 80061 LIPID PANEL: CPT

## 2024-05-30 PROCEDURE — 85652 RBC SED RATE AUTOMATED: CPT

## 2024-05-30 PROCEDURE — 86140 C-REACTIVE PROTEIN: CPT

## 2024-05-30 PROCEDURE — 85025 COMPLETE CBC W/AUTO DIFF WBC: CPT

## 2024-05-30 PROCEDURE — 83540 ASSAY OF IRON: CPT

## 2024-05-30 PROCEDURE — 80069 RENAL FUNCTION PANEL: CPT

## 2024-05-30 PROCEDURE — 36415 COLL VENOUS BLD VENIPUNCTURE: CPT

## 2024-05-30 PROCEDURE — 80076 HEPATIC FUNCTION PANEL: CPT

## 2024-06-13 ENCOUNTER — TELEPHONE (OUTPATIENT)
Dept: NON INVASIVE DIAGNOSTICS | Age: 74
End: 2024-06-13

## 2024-06-13 NOTE — TELEPHONE ENCOUNTER
Patient called office today stating that she has been having issues with her heart rate and palpitations. Patient is coming into [facility] office on 6/14/2024 for a EKG.

## 2024-06-14 ENCOUNTER — NURSE ONLY (OUTPATIENT)
Dept: CARDIOLOGY CLINIC | Age: 74
End: 2024-06-14
Payer: MEDICARE

## 2024-06-14 DIAGNOSIS — I20.9 NEW-ONSET ANGINA: Primary | ICD-10-CM

## 2024-06-14 PROCEDURE — 93000 ELECTROCARDIOGRAM COMPLETE: CPT | Performed by: INTERNAL MEDICINE

## 2024-06-17 ENCOUNTER — NURSE ONLY (OUTPATIENT)
Dept: CARDIOLOGY CLINIC | Age: 74
End: 2024-06-17

## 2024-06-17 DIAGNOSIS — I42.8 NONISCHEMIC CARDIOMYOPATHY (HCC): Primary | ICD-10-CM

## 2024-06-17 NOTE — PROGRESS NOTES
Patient was seen today and a 14 day monitor was placed. Monitor was ordered by Dr. Ross. The monitor was applied, instructions were given to the patient. Patient stated understanding and gave verbalize readback.   Monitor company: [employer]  Serial number: [id number]

## 2024-07-15 DIAGNOSIS — I42.8 NONISCHEMIC CARDIOMYOPATHY (HCC): ICD-10-CM

## 2024-08-06 ENCOUNTER — TELEPHONE (OUTPATIENT)
Dept: CARDIOLOGY CLINIC | Age: 74
End: 2024-08-06

## 2024-08-06 RX ORDER — CARVEDILOL 3.12 MG/1
3.12 TABLET ORAL 2 TIMES DAILY WITH MEALS
Qty: 180 TABLET | Refills: 0 | Status: SHIPPED | OUTPATIENT
Start: 2024-08-06

## 2024-08-06 NOTE — TELEPHONE ENCOUNTER
WE RECEIVED A PREOPERATIVE CLEARANCE EVALUATION FOR FROM DR KERNS AND DR CALLEJAS FOR MICHAEL.  A NOTE WAS MARKED TO ADDRESS THE PFO CLOSURE RECOMMENDATION, AND FOR PT. REFUSAL TO TAKE ENTRESTO PER  DR RAGLAND.    PER DR SPEARS  HE IS NOT TREATING HER FOR THE PFO AND WILL NOT COMMENT ON IT.    I CALLED AND SPOKE TO OLIMPIA AND GAVE HER THE ABOVE INFORMATION.  RLL

## 2024-08-07 RX ORDER — CARVEDILOL 3.12 MG/1
3.12 TABLET ORAL 2 TIMES DAILY
Qty: 180 TABLET | Refills: 0 | Status: SHIPPED | OUTPATIENT
Start: 2024-08-07

## 2024-08-14 ENCOUNTER — TELEPHONE (OUTPATIENT)
Dept: CARDIOLOGY CLINIC | Age: 74
End: 2024-08-14

## 2024-08-14 NOTE — TELEPHONE ENCOUNTER
DR. MEDINA APPT    I called Martha to notify her that Dr. Swan referred her to Dr. Marvin to discuss a possible  PFO Closure. She said she had seen Dr. Valencia in the past & he felt that that a PFO closure wasn't  indicated; but she is agreeable to another consultation  Location: [facility].  [address] [place] [address].  [facility] & [facility].   Date & Time of Appt: 8/27/24 at 10:30 am  Arrival Time: 10:00 am  Pt instructed to bring insurance card, photo ID, & list of medications w/ her.

## 2024-08-27 ENCOUNTER — OFFICE VISIT (OUTPATIENT)
Dept: CARDIOLOGY CLINIC | Age: 74
End: 2024-08-27
Payer: MEDICARE

## 2024-08-27 VITALS
RESPIRATION RATE: 18 BRPM | HEIGHT: 59 IN | WEIGHT: 195.2 LBS | BODY MASS INDEX: 39.35 KG/M2 | SYSTOLIC BLOOD PRESSURE: 130 MMHG | HEART RATE: 76 BPM | DIASTOLIC BLOOD PRESSURE: 64 MMHG

## 2024-08-27 DIAGNOSIS — I25.10 CORONARY ARTERY DISEASE INVOLVING NATIVE HEART WITHOUT ANGINA PECTORIS, UNSPECIFIED VESSEL OR LESION TYPE: Primary | ICD-10-CM

## 2024-08-27 DIAGNOSIS — R00.2 PALPITATIONS: ICD-10-CM

## 2024-08-27 DIAGNOSIS — I25.10 ATHEROSCLEROSIS OF NATIVE CORONARY ARTERY OF NATIVE HEART WITHOUT ANGINA PECTORIS: ICD-10-CM

## 2024-08-27 DIAGNOSIS — I10 PRIMARY HYPERTENSION: ICD-10-CM

## 2024-08-27 PROCEDURE — 99214 OFFICE O/P EST MOD 30 MIN: CPT | Performed by: INTERNAL MEDICINE

## 2024-08-27 PROCEDURE — 93000 ELECTROCARDIOGRAM COMPLETE: CPT | Performed by: INTERNAL MEDICINE

## 2024-08-27 PROCEDURE — 3078F DIAST BP <80 MM HG: CPT | Performed by: INTERNAL MEDICINE

## 2024-08-27 PROCEDURE — 3075F SYST BP GE 130 - 139MM HG: CPT | Performed by: INTERNAL MEDICINE

## 2024-08-27 PROCEDURE — 1123F ACP DISCUSS/DSCN MKR DOCD: CPT | Performed by: INTERNAL MEDICINE

## 2024-08-27 ASSESSMENT — ENCOUNTER SYMPTOMS
ABDOMINAL PAIN: 0
CHEST TIGHTNESS: 0
COUGH: 0
NAUSEA: 0
SHORTNESS OF BREATH: 0
BLOOD IN STOOL: 0
VOMITING: 0
BACK PAIN: 0

## 2024-08-27 NOTE — PROGRESS NOTES
[place] [address]                             CARDIAC CATHETERIZATION     PATIENT NAME: MICHAEL UREÑA                 :        1950  MED REC NO:   [id number]                            ROOM:       [id number]  ACCOUNT NO:   [id number]                           ADMIT DATE: 2021  PROVIDER:     Bryanna Swan MD     DATE OF PROCEDURE:  2021     STAGED PCI     INDICATION:  Severe proximal to mid RCA stenosis and positive nuclear  stress test.     PROCEDURE NOTE:  The patient was brought to the cardiac cath lab in her  usual fasting state.  Under sterile condition and under local anesthetic  and using conscious sedation, I failed to access the left common femoral  artery.  Then, Dr. Taylor was asked to obtain access and under  ultrasound guidance and using micropuncture, a 6-Hebrew sheath was  inserted into the right common femoral artery.  Peripheral angiogram  done revealed patent left common femoral artery.  Then, a 6-Hebrew JR4  guide catheter was advanced to the ascending aorta without difficulty.   The right coronary artery was engaged and a coronary angiogram was done.  Angiogram done revealed 80% tubular proximal to mid RCA stenosis with  KAROLINA-3 flow distally to a PDA and a PLV branch.  The patient was given a  bolus of heparin of 6000 units.  Then, a BMW wire was advanced to the  PDA without difficulty.  Then, a 2.5 x 15 [place] Monorail balloon was  inflated once at 12 atmospheres.  Then, a 3.5 x 22 Resolute [place] stent  was deployed at 14 atmospheres at the mid RCA with 0% residual stenosis  and KAROLINA-3 flow distally.  The patient received two tablets of crushed  Brilinta.  Her ACT was 251.  She received an extra 2000 units of  intravenous heparin.  Then, a 6-Hebrew Perclose was used by Dr. Taylor to close the arteriotomy site.  The patient tolerated the  procedure very well and no complications were noted.  No significant  blood loss occurred during the procedure.     IMPRESSION:   bruise/bleed easily.   Psychiatric/Behavioral: Negative.            PHYSICAL EXAMINATION:   VITAL SIGNS: /64   Pulse 76   Resp 18   Ht 1.499 m (4' 11\")   Wt 88.5 kg (195 lb 3.2 oz)   BMI 39.43 kg/m²     GENERAL: NAD   HEAD: Normocephalic, atraumatic.   NECK: No JVD. No carotid bruits. No neck masses.    CARDIOVASCULAR: Normal rate, regular rhythm, normal S1, S2, no MRG    LUNGS: Clear to auscultation bilaterally, no wheezing.    ABDOMEN: Abdomen is soft and not distended. No tenderness.    EXTREMITIES: There is no pedal edema.   MUSCULOSKELETAL: No joint swelling. Normal range of motion    SKIN: Skin is moist. No ulcers or lesions.   NEUROLOGICAL: No evidence of obvious neurological deficits.    PSYCHOLOGICAL: Patient is in normal mood.          LABORATORY DATA:   Lab Results   Component Value Date/Time    WBC 6.4 05/30/2024 11:08 AM    HGB 10.4 05/30/2024 11:08 AM    HCT 32.0 05/30/2024 11:08 AM     05/30/2024 11:08 AM      Lab Results   Component Value Date/Time     05/30/2024 11:08 AM    K 4.5 05/30/2024 11:08 AM    K 3.7 08/19/2020 05:01 PM    CO2 19 05/30/2024 11:08 AM    BUN 20 05/30/2024 11:08 AM    CREATININE 0.7 05/30/2024 11:08 AM      No components found for: \"HGBA1C\"   Lab Results   Component Value Date/Time    TSH 3.67 06/26/2023 12:00 AM      No components found for: \"LDLCALC\"           ASSESSMENT & PLAN:     73 y.o. female referred for evaluation of PFO. Past medical history of CAD (PCI of proximal to mid RCA in 2021), HTN, HLD, axillary artery stenosis with thoracic outlet syndrome, TIA (2007, no recurrence), ICM with HFmrEF (EF 45-50% 2021), hypothyroidism and PFO.    PFO:  Patient with SARAH and 2018 showing small PFO with mild right-to-left shunting.  She had TIA symptoms in 2007 but no evidence of acute stroke on imaging.  There is no recurrence of symptoms since that time.  I do not think there is a current indication to close her PFO since there is no recurrence of her TIA  Heidi Hilario(Resident)

## 2024-08-29 DIAGNOSIS — I20.9 NEW-ONSET ANGINA (HCC): ICD-10-CM

## 2024-09-03 ENCOUNTER — HOSPITAL ENCOUNTER (OUTPATIENT)
Age: 74
Discharge: HOME | End: 2024-09-03
Payer: MEDICARE

## 2024-09-03 LAB
ALBUMIN SERPL-MCNC: 4.2 G/DL (ref 3.5–5.2)
ALBUMIN: 4.2 G/DL (ref 3.5–5.2)
ALP SERPL-CCNC: 79 U/L (ref 35–104)
ALT SERPL-CCNC: 10 U/L (ref 0–32)
ANION GAP SERPL CALCULATED.3IONS-SCNC: 14 MMOL/L (ref 7–16)
AST SERPL-CCNC: 12 U/L (ref 0–31)
BASOPHILS # BLD: 0.03 K/UL (ref 0–0.2)
BASOPHILS NFR BLD: 1 % (ref 0–2)
BILIRUB DIRECT SERPL-MCNC: <0.2 MG/DL (ref 0–0.3)
BILIRUB INDIRECT SERPL-MCNC: NORMAL MG/DL (ref 0–1)
BILIRUB SERPL-MCNC: 0.4 MG/DL (ref 0–1.2)
BUN SERPL-MCNC: 23 MG/DL (ref 6–23)
CALCIUM SERPL-MCNC: 9.2 MG/DL (ref 8.6–10.2)
CHLORIDE SERPL-SCNC: 107 MMOL/L (ref 98–107)
CHOLEST SERPL-MCNC: 143 MG/DL
CO2 SERPL-SCNC: 23 MMOL/L (ref 22–29)
CREAT SERPL-MCNC: 0.8 MG/DL (ref 0.5–1)
CRP SERPL HS-MCNC: 4 MG/L (ref 0–5)
EOSINOPHIL # BLD: 0.12 K/UL (ref 0.05–0.5)
EOSINOPHILS RELATIVE PERCENT: 2 % (ref 0–6)
ERYTHROCYTE [DISTWIDTH] IN BLOOD BY AUTOMATED COUNT: 13.8 % (ref 11.5–15)
ERYTHROCYTE [SEDIMENTATION RATE] IN BLOOD BY WESTERGREN METHOD: 28 MM/HR (ref 0–20)
GFR, ESTIMATED: 83 ML/MIN/1.73M2
GLUCOSE SERPL-MCNC: 90 MG/DL (ref 74–99)
HCT VFR BLD AUTO: 33.1 % (ref 34–48)
HDLC SERPL-MCNC: 49 MG/DL
HGB BLD-MCNC: 10.2 G/DL (ref 11.5–15.5)
IMM GRANULOCYTES # BLD AUTO: 0.03 K/UL (ref 0–0.58)
IMM GRANULOCYTES NFR BLD: 1 % (ref 0–5)
IRON SATN MFR SERPL: 18 % (ref 15–50)
IRON SERPL-MCNC: 46 UG/DL (ref 37–145)
LDLC SERPL CALC-MCNC: 70 MG/DL
LYMPHOCYTES NFR BLD: 0.89 K/UL (ref 1.5–4)
LYMPHOCYTES RELATIVE PERCENT: 14 % (ref 20–42)
MCH RBC QN AUTO: 26.9 PG (ref 26–35)
MCHC RBC AUTO-ENTMCNC: 30.8 G/DL (ref 32–34.5)
MCV RBC AUTO: 87.3 FL (ref 80–99.9)
MONOCYTES NFR BLD: 0.59 K/UL (ref 0.1–0.95)
MONOCYTES NFR BLD: 9 % (ref 2–12)
NEUTROPHILS NFR BLD: 74 % (ref 43–80)
NEUTS SEG NFR BLD: 4.66 K/UL (ref 1.8–7.3)
PHOSPHATE SERPL-MCNC: 3.7 MG/DL (ref 2.5–4.5)
PLATELET # BLD AUTO: 194 K/UL (ref 130–450)
PMV BLD AUTO: 11.8 FL (ref 7–12)
POTASSIUM SERPL-SCNC: 4.5 MMOL/L (ref 3.5–5)
PROT SERPL-MCNC: 7.7 G/DL (ref 6.4–8.3)
RBC # BLD AUTO: 3.79 M/UL (ref 3.5–5.5)
SODIUM SERPL-SCNC: 144 MMOL/L (ref 132–146)
TIBC SERPL-MCNC: 251 UG/DL (ref 250–450)
TRIGL SERPL-MCNC: 122 MG/DL
VLDLC SERPL CALC-MCNC: 24 MG/DL
WBC OTHER # BLD: 6.3 K/UL (ref 4.5–11.5)

## 2024-09-03 PROCEDURE — 83550 IRON BINDING TEST: CPT

## 2024-09-03 PROCEDURE — 80076 HEPATIC FUNCTION PANEL: CPT

## 2024-09-03 PROCEDURE — 85025 COMPLETE CBC W/AUTO DIFF WBC: CPT

## 2024-09-03 PROCEDURE — 36415 COLL VENOUS BLD VENIPUNCTURE: CPT

## 2024-09-03 PROCEDURE — 85652 RBC SED RATE AUTOMATED: CPT

## 2024-09-03 PROCEDURE — 80069 RENAL FUNCTION PANEL: CPT

## 2024-09-03 PROCEDURE — 83540 ASSAY OF IRON: CPT

## 2024-09-03 PROCEDURE — 80061 LIPID PANEL: CPT

## 2024-09-03 PROCEDURE — 86140 C-REACTIVE PROTEIN: CPT

## 2024-09-03 RX ORDER — ISOSORBIDE MONONITRATE 60 MG/1
60 TABLET, EXTENDED RELEASE ORAL DAILY
Qty: 30 TABLET | Refills: 5 | Status: SHIPPED | OUTPATIENT
Start: 2024-09-03

## 2024-09-11 ENCOUNTER — TELEPHONE (OUTPATIENT)
Dept: CARDIOLOGY CLINIC | Age: 74
End: 2024-09-11

## 2024-09-11 DIAGNOSIS — I20.9 NEW-ONSET ANGINA (HCC): ICD-10-CM

## 2024-09-11 RX ORDER — ISOSORBIDE MONONITRATE 60 MG/1
60 TABLET, EXTENDED RELEASE ORAL DAILY
Qty: 30 TABLET | Refills: 5 | Status: SHIPPED | OUTPATIENT
Start: 2024-09-11

## 2024-11-01 PROBLEM — Z12.31 ENCOUNTER FOR SCREENING MAMMOGRAM FOR HIGH-RISK PATIENT: Status: RESOLVED | Noted: 2022-02-28 | Resolved: 2024-11-01

## 2025-01-07 ENCOUNTER — HOSPITAL ENCOUNTER (OUTPATIENT)
Age: 75
Discharge: HOME | End: 2025-01-07
Payer: MEDICARE

## 2025-01-07 LAB
ALBUMIN SERPL-MCNC: 4.4 G/DL (ref 3.5–5.2)
ALP SERPL-CCNC: 81 U/L (ref 35–104)
ALT SERPL-CCNC: 11 U/L (ref 0–32)
ANION GAP SERPL CALCULATED.3IONS-SCNC: 12 MMOL/L (ref 7–16)
AST SERPL-CCNC: 13 U/L (ref 0–31)
BASOPHILS # BLD: 0.03 K/UL (ref 0–0.2)
BASOPHILS NFR BLD: 1 % (ref 0–2)
BILIRUB DIRECT SERPL-MCNC: <0.2 MG/DL (ref 0–0.3)
BILIRUB INDIRECT SERPL-MCNC: ABNORMAL MG/DL (ref 0–1)
BILIRUB SERPL-MCNC: 0.4 MG/DL (ref 0–1.2)
BUN SERPL-MCNC: 25 MG/DL (ref 6–23)
CALCIUM SERPL-MCNC: 9.5 MG/DL (ref 8.6–10.2)
CHLORIDE SERPL-SCNC: 102 MMOL/L (ref 98–107)
CHOLEST SERPL-MCNC: 161 MG/DL
CO2 SERPL-SCNC: 27 MMOL/L (ref 22–29)
CREAT SERPL-MCNC: 0.7 MG/DL (ref 0.5–1)
CRP SERPL HS-MCNC: 3 MG/L (ref 0–5)
EOSINOPHIL # BLD: 0.15 K/UL (ref 0.05–0.5)
EOSINOPHILS RELATIVE PERCENT: 2 % (ref 0–6)
ERYTHROCYTE [DISTWIDTH] IN BLOOD BY AUTOMATED COUNT: 13.6 % (ref 11.5–15)
ERYTHROCYTE [SEDIMENTATION RATE] IN BLOOD BY WESTERGREN METHOD: 41 MM/HR (ref 0–20)
GFR, ESTIMATED: 85 ML/MIN/1.73M2
GLUCOSE SERPL-MCNC: 100 MG/DL (ref 74–99)
HCT VFR BLD AUTO: 33 % (ref 34–48)
HDLC SERPL-MCNC: 57 MG/DL
HGB BLD-MCNC: 10.4 G/DL (ref 11.5–15.5)
IMM GRANULOCYTES # BLD AUTO: 0.03 K/UL (ref 0–0.58)
IMM GRANULOCYTES NFR BLD: 1 % (ref 0–5)
IRON SATN MFR SERPL: 24 % (ref 15–50)
IRON SERPL-MCNC: 61 UG/DL (ref 37–145)
LDLC SERPL CALC-MCNC: 81 MG/DL
LYMPHOCYTES NFR BLD: 0.86 K/UL (ref 1.5–4)
LYMPHOCYTES RELATIVE PERCENT: 13 % (ref 20–42)
MCH RBC QN AUTO: 27.1 PG (ref 26–35)
MCHC RBC AUTO-ENTMCNC: 31.5 G/DL (ref 32–34.5)
MCV RBC AUTO: 85.9 FL (ref 80–99.9)
MONOCYTES NFR BLD: 0.58 K/UL (ref 0.1–0.95)
MONOCYTES NFR BLD: 9 % (ref 2–12)
NEUTROPHILS NFR BLD: 75 % (ref 43–80)
NEUTS SEG NFR BLD: 4.84 K/UL (ref 1.8–7.3)
PHOSPHATE SERPL-MCNC: 3.7 MG/DL (ref 2.5–4.5)
PLATELET # BLD AUTO: 188 K/UL (ref 130–450)
PMV BLD AUTO: 11.4 FL (ref 7–12)
POTASSIUM SERPL-SCNC: 4.9 MMOL/L (ref 3.5–5)
PROT SERPL-MCNC: 7.8 G/DL (ref 6.4–8.3)
RBC # BLD AUTO: 3.84 M/UL (ref 3.5–5.5)
SODIUM SERPL-SCNC: 141 MMOL/L (ref 132–146)
TIBC SERPL-MCNC: 258 UG/DL (ref 250–450)
TRIGL SERPL-MCNC: 115 MG/DL
VLDLC SERPL CALC-MCNC: 23 MG/DL
WBC OTHER # BLD: 6.5 K/UL (ref 4.5–11.5)

## 2025-01-07 PROCEDURE — 83540 ASSAY OF IRON: CPT

## 2025-01-07 PROCEDURE — 36415 COLL VENOUS BLD VENIPUNCTURE: CPT

## 2025-01-07 PROCEDURE — 85652 RBC SED RATE AUTOMATED: CPT

## 2025-01-07 PROCEDURE — 80061 LIPID PANEL: CPT

## 2025-01-07 PROCEDURE — 83550 IRON BINDING TEST: CPT

## 2025-01-07 PROCEDURE — 82248 BILIRUBIN DIRECT: CPT

## 2025-01-07 PROCEDURE — 85025 COMPLETE CBC W/AUTO DIFF WBC: CPT

## 2025-01-07 PROCEDURE — 84100 ASSAY OF PHOSPHORUS: CPT

## 2025-01-07 PROCEDURE — 86140 C-REACTIVE PROTEIN: CPT

## 2025-01-07 PROCEDURE — 80053 COMPREHEN METABOLIC PANEL: CPT

## 2025-02-18 RX ORDER — CARVEDILOL 3.12 MG/1
3.12 TABLET ORAL 2 TIMES DAILY WITH MEALS
Qty: 180 TABLET | Refills: 0 | Status: SHIPPED | OUTPATIENT
Start: 2025-02-18

## 2025-03-14 DIAGNOSIS — I20.9 NEW-ONSET ANGINA: ICD-10-CM

## 2025-03-14 RX ORDER — ISOSORBIDE MONONITRATE 60 MG/1
60 TABLET, EXTENDED RELEASE ORAL DAILY
Qty: 90 TABLET | Refills: 5 | Status: SHIPPED | OUTPATIENT
Start: 2025-03-14

## 2025-05-14 RX ORDER — CARVEDILOL 3.12 MG/1
3.12 TABLET ORAL 2 TIMES DAILY WITH MEALS
Qty: 180 TABLET | Refills: 0 | Status: SHIPPED | OUTPATIENT
Start: 2025-05-14

## 2025-06-17 ENCOUNTER — OFFICE VISIT (OUTPATIENT)
Dept: CARDIOLOGY CLINIC | Age: 75
End: 2025-06-17
Payer: MEDICARE

## 2025-06-17 VITALS
HEIGHT: 59 IN | BODY MASS INDEX: 39.92 KG/M2 | HEART RATE: 76 BPM | DIASTOLIC BLOOD PRESSURE: 72 MMHG | SYSTOLIC BLOOD PRESSURE: 130 MMHG | RESPIRATION RATE: 20 BRPM | WEIGHT: 198 LBS

## 2025-06-17 DIAGNOSIS — I25.10 CORONARY ARTERY DISEASE INVOLVING NATIVE HEART WITHOUT ANGINA PECTORIS, UNSPECIFIED VESSEL OR LESION TYPE: ICD-10-CM

## 2025-06-17 DIAGNOSIS — R09.89 BILATERAL CAROTID BRUITS: ICD-10-CM

## 2025-06-17 DIAGNOSIS — I49.8 FLUTTERING HEART: Primary | ICD-10-CM

## 2025-06-17 PROCEDURE — 1159F MED LIST DOCD IN RCRD: CPT | Performed by: INTERNAL MEDICINE

## 2025-06-17 PROCEDURE — 93000 ELECTROCARDIOGRAM COMPLETE: CPT | Performed by: INTERNAL MEDICINE

## 2025-06-17 PROCEDURE — 3078F DIAST BP <80 MM HG: CPT | Performed by: INTERNAL MEDICINE

## 2025-06-17 PROCEDURE — 1160F RVW MEDS BY RX/DR IN RCRD: CPT | Performed by: INTERNAL MEDICINE

## 2025-06-17 PROCEDURE — 1123F ACP DISCUSS/DSCN MKR DOCD: CPT | Performed by: INTERNAL MEDICINE

## 2025-06-17 PROCEDURE — 3075F SYST BP GE 130 - 139MM HG: CPT | Performed by: INTERNAL MEDICINE

## 2025-06-17 PROCEDURE — 99214 OFFICE O/P EST MOD 30 MIN: CPT | Performed by: INTERNAL MEDICINE

## 2025-06-17 ASSESSMENT — ENCOUNTER SYMPTOMS
ABDOMINAL PAIN: 0
CONSTIPATION: 0
DIARRHEA: 0
BLOOD IN STOOL: 0
BACK PAIN: 0
VOMITING: 0
COUGH: 0
SHORTNESS OF BREATH: 0
WHEEZING: 0
NAUSEA: 0

## 2025-06-17 NOTE — PROGRESS NOTES
OUTPATIENT CARDIOLOGY FOLLOW-UP    HPI:    Name: Nicole Milton    Age: 74 y.o.    Primary Care Physician: Pamela Hernandez DO    Date of Service: 6/17/2025    Chief Complaint:   Chief Complaint   Patient presents with    fluttering heart    Coronary Artery Disease     Annual... patient has no c/c        History of present illness :   74-year-old obese female who comes today for follow-up visit.  She was last seen in my office on 2/9/2020 for.  Patient underwent successful PCI of her proximal to mid RCA on 4/19/2021.  She has history of CAD, cardiomyopathy with ejection fraction of 45-50%, hypertension, hyperlipidemia, PFO, right axillary stenosis secondary to thoracic outlet syndrome, hypothyroidism, and iatrogenic retroperitoneal bleed during left heart catheterization via the right femoral artery access.  Patient was seen by Dr. Valencia on 7/12/2021 for evaluation of PFO closure.  PFO closure was recommended if developing recurrent TIA or stroke without alternative etiology of CVA.    Patient was seen by Dr. Ross and was thought to have atrial tachycardia.  Patient did not want to increase her beta-blocker.  She was seen by Dr. Barreto due to PFO and no closure was recommended since asymptomatic.    Patient has not been active.  She feels pretty good.  She continues to complain of palpitations 3-4 times a week lasting less than a minute.  She has been complaining of sharp left-sided chest pain lasting less than a minute.  She complains of chronic dyspnea on exertion.  She feels dizzy at times and admitted to get lower extremity edema.  She denies syncope    EKG done today revealed sinus rhythm at 76 bpm with lead misplacement.      Review of Systems:   Review of Systems   Constitutional:  Negative for chills, fatigue and fever.   HENT:  Negative for nosebleeds.    Respiratory:  Negative for cough, shortness of breath and wheezing.         She snores at night and gasps for air.   Gastrointestinal:  Negative

## 2025-06-18 ENCOUNTER — HOSPITAL ENCOUNTER (OUTPATIENT)
Dept: GENERAL RADIOLOGY | Age: 75
Discharge: HOME | End: 2025-06-20
Payer: MEDICARE

## 2025-06-18 VITALS — WEIGHT: 198 LBS | BODY MASS INDEX: 39.92 KG/M2 | HEIGHT: 59 IN

## 2025-06-18 DIAGNOSIS — Z12.31 OTHER SCREENING MAMMOGRAM: ICD-10-CM

## 2025-06-18 PROCEDURE — 77063 BREAST TOMOSYNTHESIS BI: CPT

## 2025-07-10 ENCOUNTER — HOSPITAL ENCOUNTER (OUTPATIENT)
Age: 75
Discharge: HOME | End: 2025-07-10
Payer: MEDICARE

## 2025-07-10 LAB
ALBUMIN SERPL-MCNC: 4.2 G/DL (ref 3.5–5.2)
ALBUMIN: 4.2 G/DL (ref 3.5–5.2)
ALP SERPL-CCNC: 76 U/L (ref 35–104)
ALT SERPL-CCNC: 14 U/L (ref 0–35)
ANION GAP SERPL CALCULATED.3IONS-SCNC: 11 MMOL/L (ref 7–16)
AST SERPL-CCNC: 18 U/L (ref 0–35)
BASOPHILS # BLD: 0.02 K/UL (ref 0–0.2)
BASOPHILS NFR BLD: 0 % (ref 0–2)
BILIRUB DIRECT SERPL-MCNC: 0.2 MG/DL (ref 0–0.2)
BILIRUB INDIRECT SERPL-MCNC: 0.2 MG/DL (ref 0–1)
BILIRUB SERPL-MCNC: 0.4 MG/DL (ref 0–1.2)
BUN SERPL-MCNC: 16 MG/DL (ref 8–23)
CALCIUM SERPL-MCNC: 9.2 MG/DL (ref 8.8–10.2)
CHLORIDE SERPL-SCNC: 103 MMOL/L (ref 98–107)
CO2 SERPL-SCNC: 27 MMOL/L (ref 22–29)
CREAT SERPL-MCNC: 0.7 MG/DL (ref 0.5–1)
CRP SERPL HS-MCNC: 3.9 MG/L (ref 0–5)
EOSINOPHIL # BLD: 0.23 K/UL (ref 0.05–0.5)
EOSINOPHILS RELATIVE PERCENT: 3 % (ref 0–6)
ERYTHROCYTE [DISTWIDTH] IN BLOOD BY AUTOMATED COUNT: 13.5 % (ref 11.5–15)
ERYTHROCYTE [SEDIMENTATION RATE] IN BLOOD BY WESTERGREN METHOD: 40 MM/HR (ref 0–20)
GFR, ESTIMATED: 86 ML/MIN/1.73M2
GLUCOSE SERPL-MCNC: 120 MG/DL (ref 74–99)
HCT VFR BLD AUTO: 32.1 % (ref 34–48)
HGB BLD-MCNC: 10.1 G/DL (ref 11.5–15.5)
IMM GRANULOCYTES # BLD AUTO: 0.05 K/UL (ref 0–0.58)
IMM GRANULOCYTES NFR BLD: 1 % (ref 0–5)
LYMPHOCYTES NFR BLD: 0.97 K/UL (ref 1.5–4)
LYMPHOCYTES RELATIVE PERCENT: 13 % (ref 20–42)
MCH RBC QN AUTO: 27.7 PG (ref 26–35)
MCHC RBC AUTO-ENTMCNC: 31.5 G/DL (ref 32–34.5)
MCV RBC AUTO: 87.9 FL (ref 80–99.9)
MONOCYTES NFR BLD: 0.68 K/UL (ref 0.1–0.95)
MONOCYTES NFR BLD: 9 % (ref 2–12)
NEUTROPHILS NFR BLD: 74 % (ref 43–80)
NEUTS SEG NFR BLD: 5.49 K/UL (ref 1.8–7.3)
PHOSPHATE SERPL-MCNC: 3.3 MG/DL (ref 2.5–4.5)
PLATELET # BLD AUTO: 187 K/UL (ref 130–450)
PMV BLD AUTO: 11.4 FL (ref 7–12)
POTASSIUM SERPL-SCNC: 4.3 MMOL/L (ref 3.5–5.1)
PROT SERPL-MCNC: 7.4 G/DL (ref 6.4–8.3)
RBC # BLD AUTO: 3.65 M/UL (ref 3.5–5.5)
SODIUM SERPL-SCNC: 142 MMOL/L (ref 136–145)
WBC OTHER # BLD: 7.4 K/UL (ref 4.5–11.5)

## 2025-07-10 PROCEDURE — 85652 RBC SED RATE AUTOMATED: CPT

## 2025-07-10 PROCEDURE — 80069 RENAL FUNCTION PANEL: CPT

## 2025-07-10 PROCEDURE — 80076 HEPATIC FUNCTION PANEL: CPT

## 2025-07-10 PROCEDURE — 36415 COLL VENOUS BLD VENIPUNCTURE: CPT

## 2025-07-10 PROCEDURE — 86140 C-REACTIVE PROTEIN: CPT

## 2025-07-10 PROCEDURE — 85025 COMPLETE CBC W/AUTO DIFF WBC: CPT

## 2025-07-15 ENCOUNTER — HOSPITAL ENCOUNTER (OUTPATIENT)
Dept: ULTRASOUND IMAGING | Age: 75
Discharge: HOME | End: 2025-07-17
Attending: INTERNAL MEDICINE
Payer: MEDICARE

## 2025-07-15 DIAGNOSIS — R09.89 BILATERAL CAROTID BRUITS: ICD-10-CM

## 2025-07-15 PROCEDURE — 93880 EXTRACRANIAL BILAT STUDY: CPT

## 2025-07-23 ENCOUNTER — HOSPITAL ENCOUNTER (OUTPATIENT)
Age: 75
Discharge: HOME | End: 2025-07-23
Payer: MEDICARE

## 2025-07-23 LAB
25(OH)D3 SERPL-MCNC: 39 NG/ML (ref 30–100)
HCYS SERPL-SCNC: 8.3 UMOL/L (ref 0–15)

## 2025-07-23 PROCEDURE — 82306 VITAMIN D 25 HYDROXY: CPT

## 2025-07-23 PROCEDURE — 82607 VITAMIN B-12: CPT

## 2025-07-23 PROCEDURE — 36415 COLL VENOUS BLD VENIPUNCTURE: CPT

## 2025-07-23 PROCEDURE — 82746 ASSAY OF FOLIC ACID SERUM: CPT

## 2025-07-23 PROCEDURE — 83090 ASSAY OF HOMOCYSTEINE: CPT

## 2025-07-24 LAB
FOLATE SERPL-MCNC: 31.8 NG/ML (ref 4.6–34.8)
VIT B12 SERPL-MCNC: 467 PG/ML (ref 232–1245)

## 2025-08-12 ENCOUNTER — TELEPHONE (OUTPATIENT)
Dept: CARDIOLOGY CLINIC | Age: 75
End: 2025-08-12

## 2025-08-25 RX ORDER — CARVEDILOL 3.12 MG/1
3.12 TABLET ORAL 2 TIMES DAILY WITH MEALS
Qty: 180 TABLET | Refills: 3 | Status: SHIPPED | OUTPATIENT
Start: 2025-08-25

## 2025-08-25 RX ORDER — CARVEDILOL 3.12 MG/1
3.12 TABLET ORAL 2 TIMES DAILY
Qty: 180 TABLET | Refills: 1 | Status: SHIPPED | OUTPATIENT
Start: 2025-08-25

## (undated) DEVICE — SURGICAL PROCEDURE PACK BRONCH

## (undated) DEVICE — SOLUTION IV IRRIG 500ML 0.9% SODIUM CHL 2F7123